# Patient Record
Sex: MALE | Race: WHITE | NOT HISPANIC OR LATINO | URBAN - NONMETROPOLITAN AREA
[De-identification: names, ages, dates, MRNs, and addresses within clinical notes are randomized per-mention and may not be internally consistent; named-entity substitution may affect disease eponyms.]

---

## 2017-01-10 ENCOUNTER — OFFICE VISIT (OUTPATIENT)
Dept: FAMILY MEDICINE CLINIC | Facility: CLINIC | Age: 32
End: 2017-01-10

## 2017-01-10 ENCOUNTER — HOSPITAL ENCOUNTER (OUTPATIENT)
Dept: OTHER | Facility: HOSPITAL | Age: 32
Discharge: HOME | End: 2017-01-10
Attending: NURSE PRACTITIONER | Admitting: NURSE PRACTITIONER

## 2017-01-10 VITALS
WEIGHT: 229.8 LBS | HEIGHT: 72 IN | TEMPERATURE: 97.8 F | SYSTOLIC BLOOD PRESSURE: 130 MMHG | BODY MASS INDEX: 31.13 KG/M2 | HEART RATE: 82 BPM | DIASTOLIC BLOOD PRESSURE: 70 MMHG

## 2017-01-10 DIAGNOSIS — R53.83 FATIGUE, UNSPECIFIED TYPE: Primary | ICD-10-CM

## 2017-01-10 DIAGNOSIS — M25.561 ARTHRALGIA OF BOTH KNEES: ICD-10-CM

## 2017-01-10 DIAGNOSIS — M25.562 ARTHRALGIA OF BOTH KNEES: ICD-10-CM

## 2017-01-10 DIAGNOSIS — G47.30 SLEEP APNEA, UNSPECIFIED TYPE: Primary | ICD-10-CM

## 2017-01-10 DIAGNOSIS — H66.002 ACUTE SUPPURATIVE OTITIS MEDIA OF LEFT EAR WITHOUT SPONTANEOUS RUPTURE OF TYMPANIC MEMBRANE, RECURRENCE NOT SPECIFIED: ICD-10-CM

## 2017-01-10 PROCEDURE — 99213 OFFICE O/P EST LOW 20 MIN: CPT | Performed by: NURSE PRACTITIONER

## 2017-01-10 RX ORDER — FLUTICASONE PROPIONATE 50 MCG
2 SPRAY, SUSPENSION (ML) NASAL DAILY
Qty: 1 EACH | Refills: 0 | Status: SHIPPED | OUTPATIENT
Start: 2017-01-10 | End: 2021-07-20

## 2017-01-10 RX ORDER — AZITHROMYCIN 250 MG/1
TABLET, FILM COATED ORAL
Qty: 6 TABLET | Refills: 0 | Status: SHIPPED | OUTPATIENT
Start: 2017-01-10 | End: 2017-08-16

## 2017-01-10 RX ORDER — MELOXICAM 7.5 MG/1
7.5 TABLET ORAL DAILY
Qty: 30 TABLET | Refills: 2 | Status: SHIPPED | OUTPATIENT
Start: 2017-01-10 | End: 2017-06-09 | Stop reason: SDUPTHER

## 2017-02-01 ENCOUNTER — OFFICE VISIT (OUTPATIENT)
Dept: PULMONOLOGY | Facility: CLINIC | Age: 32
End: 2017-02-01

## 2017-02-01 VITALS
HEIGHT: 72 IN | DIASTOLIC BLOOD PRESSURE: 70 MMHG | BODY MASS INDEX: 31.02 KG/M2 | SYSTOLIC BLOOD PRESSURE: 130 MMHG | WEIGHT: 229 LBS

## 2017-02-01 DIAGNOSIS — G47.30 SLEEP APNEA, UNSPECIFIED TYPE: Primary | ICD-10-CM

## 2017-02-01 PROCEDURE — 99203 OFFICE O/P NEW LOW 30 MIN: CPT | Performed by: INTERNAL MEDICINE

## 2017-02-01 RX ORDER — ALBUTEROL SULFATE 90 UG/1
2 AEROSOL, METERED RESPIRATORY (INHALATION) EVERY 6 HOURS
COMMUNITY
Start: 2016-08-11 | End: 2017-08-12

## 2017-02-01 NOTE — PROGRESS NOTES
Subjective   Marko Jaquez is a 31 y.o. male.  Chief complaint is daytime hypersomnolence     History of Present Illness   This gentleman has a several year history of severe snoring and daytime hypersomnolence.  He falls asleep quite easily and snoring at nighttime and has witnessed apneas by his wife.  Otherwise is healthy individual's underground .  His main medical problems and low back flank pain from an injury.  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  Medications please see his list.  Medication allergies Lortab.  Family history is positive for sleep apnea  The following portions of the patient's history were reviewed and updated as appropriate: allergies, current medications, past family history, past medical history, past social history, past surgical history and problem list.    Review of Systems   Constitutional: Negative for activity change, chills, fatigue, fever and unexpected weight change.   HENT: Positive for congestion. Negative for dental problem, ear discharge, ear pain, facial swelling, hearing loss, nosebleeds, postnasal drip, rhinorrhea, sinus pressure, sneezing, sore throat, tinnitus, trouble swallowing and voice change.    Eyes: Negative.  Negative for photophobia, pain, discharge, redness, itching and visual disturbance.   Respiratory: Negative for cough, chest tightness, shortness of breath and wheezing. Choking:  Daytime hypersomnolence severe snoring.    Cardiovascular: Negative for chest pain, palpitations and leg swelling.   Gastrointestinal: Negative for abdominal distention, abdominal pain and vomiting.   Endocrine: Negative.  Negative for cold intolerance, heat intolerance, polydipsia and polyphagia.   Genitourinary: Negative.  Negative for decreased urine volume, dysuria, enuresis, flank pain, frequency, hematuria and urgency.   Musculoskeletal: Positive for arthralgias and back pain. Negative for gait  problem, joint swelling, myalgias and neck pain.   Skin: Negative for color change, pallor, rash and wound.   Allergic/Immunologic: Negative.  Negative for environmental allergies, food allergies and immunocompromised state.   Neurological: Negative.  Negative for dizziness, tremors, seizures, syncope, facial asymmetry, speech difficulty, weakness, light-headedness, numbness and headaches.   Hematological: Negative for adenopathy. Does not bruise/bleed easily.   Psychiatric/Behavioral: Negative for agitation, behavioral problems, confusion, decreased concentration, hallucinations and self-injury. The patient is not hyperactive.    All other systems reviewed and are negative.      Objective   Physical Exam   Constitutional: He appears well-developed and well-nourished. No distress.   HENT:   Head: Normocephalic.   Mouth/Throat: No oropharyngeal exudate.   Eyes: Conjunctivae are normal. Pupils are equal, round, and reactive to light. Right eye exhibits no discharge. Left eye exhibits no discharge. No scleral icterus.   Neck: Normal range of motion. Neck supple. No JVD present. No tracheal deviation present. No thyromegaly present.   Cardiovascular: Normal rate, regular rhythm, normal heart sounds and intact distal pulses.  Exam reveals no gallop and no friction rub.    No murmur heard.  Pulmonary/Chest: Effort normal and breath sounds normal. No respiratory distress. He has no wheezes. He has no rales. He exhibits no tenderness.   Abdominal: Bowel sounds are normal. He exhibits no distension and no mass. There is no tenderness. There is no guarding.   Musculoskeletal: He exhibits no tenderness or deformity.   Lymphadenopathy:     He has no cervical adenopathy.   Neurological: He is alert. He has normal reflexes. He displays normal reflexes. No cranial nerve deficit. He exhibits normal muscle tone. Coordination normal.   Skin: Skin is warm and dry. No rash noted. He is not diaphoretic. No pallor.   Psychiatric: He has  a normal mood and affect. His behavior is normal. Judgment and thought content normal.   Nursing note and vitals reviewed.      Assessment/Plan   Marko was seen today for follow-up.    Diagnoses and all orders for this visit:    Sleep apnea, unspecified type      Assessment severe snoring and daytime hypersomnolence probably secondary to sleep apnea, low back pain    Plan we'll schedule sleep study and see him back afterwards

## 2017-06-09 RX ORDER — MELOXICAM 7.5 MG/1
TABLET ORAL
Qty: 30 TABLET | Refills: 0 | Status: SHIPPED | OUTPATIENT
Start: 2017-06-09 | End: 2017-08-10 | Stop reason: SDUPTHER

## 2017-08-10 RX ORDER — MELOXICAM 7.5 MG/1
TABLET ORAL
Qty: 30 TABLET | Refills: 0 | Status: SHIPPED | OUTPATIENT
Start: 2017-08-10 | End: 2017-09-22 | Stop reason: SDUPTHER

## 2017-08-16 ENCOUNTER — OFFICE VISIT (OUTPATIENT)
Dept: FAMILY MEDICINE CLINIC | Facility: CLINIC | Age: 32
End: 2017-08-16

## 2017-08-16 VITALS
BODY MASS INDEX: 30.61 KG/M2 | OXYGEN SATURATION: 98 % | HEIGHT: 72 IN | DIASTOLIC BLOOD PRESSURE: 78 MMHG | SYSTOLIC BLOOD PRESSURE: 118 MMHG | HEART RATE: 87 BPM | WEIGHT: 226 LBS

## 2017-08-16 DIAGNOSIS — M25.511 ACUTE PAIN OF RIGHT SHOULDER: ICD-10-CM

## 2017-08-16 DIAGNOSIS — M54.41 CHRONIC BILATERAL LOW BACK PAIN WITH BILATERAL SCIATICA: Primary | ICD-10-CM

## 2017-08-16 DIAGNOSIS — M54.42 CHRONIC BILATERAL LOW BACK PAIN WITH BILATERAL SCIATICA: Primary | ICD-10-CM

## 2017-08-16 DIAGNOSIS — G89.29 CHRONIC BILATERAL LOW BACK PAIN WITH BILATERAL SCIATICA: Primary | ICD-10-CM

## 2017-08-16 PROCEDURE — 99214 OFFICE O/P EST MOD 30 MIN: CPT | Performed by: NURSE PRACTITIONER

## 2017-08-16 RX ORDER — IBUPROFEN 800 MG/1
800 TABLET ORAL EVERY 6 HOURS PRN
Qty: 60 TABLET | Refills: 0 | Status: SHIPPED | OUTPATIENT
Start: 2017-08-16 | End: 2017-11-21 | Stop reason: ALTCHOICE

## 2017-08-16 NOTE — PROGRESS NOTES
Subjective   Marko Jaquez is a 31 y.o. male. Patient here today with complaints of Back Pain (Needs letter for work becuase of back pain)  Patient here today with complaints of recurrent low back pain which has worsened after we changed positions at his job.  He has been more active and has been lifting heavier objects in his pain has worsened.  His pain does increase with twisting motions and position changes.  He reports radicular symptoms to hips bilaterally but denies weakness or numbness or tingling down lower extremities.  He has history of back surgery with Dr. Frantz Harper in [place] approximately 3 years ago.  Not currently taking any pain medication but is on Moby Aris daily for joint pain.  Rates pain as 4-5/10 on the pain scale today.  In addition to above, he is complaining of right shoulder pain, present off and on for the last few months, worse with raising his arm.  Denies injury.    Vitals:    08/16/17 0936   BP: 118/78   Pulse: 87   SpO2: 98%     Past Medical History:   Diagnosis Date   • Acute bronchitis    • Acute pharyngitis    • Backache    • Chronic low back pain    • Dysfunction of eustachian tube    • Exanthematous disorder    • Generalized abdominal pain    • Headache    • Health examination of defined sub-group    • Heat exhaustion    • Influenza- like illness    • Intervertebral disc prolapse    • Low back pain     with radiculopathy   • Malaise and fatigue    • Pain in elbow    • Pneumonia    • Sciatica    • Skin sensation disturbance      Back Pain   This is a recurrent problem. The current episode started more than 1 month ago. The problem occurs constantly. The problem is unchanged. The pain is present in the lumbar spine. The quality of the pain is described as aching. Radiates to: Bilateral hips. The pain is at a severity of 5/10. The pain is moderate. The symptoms are aggravated by bending, position, standing, sitting and twisting. Associated symptoms include pelvic pain.  Pertinent negatives include no abdominal pain, bladder incontinence, bowel incontinence, chest pain, dysuria, fever, headaches, leg pain, numbness, paresis, paresthesias, perianal numbness, tingling, weakness or weight loss. Risk factors include obesity. He has tried NSAIDs for the symptoms. The treatment provided mild relief.   Upper Extremity Issue   This is a new problem. The current episode started 1 to 4 weeks ago. The problem occurs constantly. The problem has been unchanged. Pertinent negatives include no abdominal pain, anorexia, arthralgias, change in bowel habit, chest pain, chills, congestion, coughing, diaphoresis, fatigue, fever, headaches, joint swelling, myalgias, nausea, neck pain, numbness, rash, sore throat, swollen glands, urinary symptoms, vertigo, visual change, vomiting or weakness. Exacerbated by: Raising right upper extremity. He has tried NSAIDs and rest for the symptoms. The treatment provided mild relief.        The following portions of the patient's history were reviewed and updated as appropriate: allergies, current medications, past family history, past medical history, past social history, past surgical history and problem list.    Review of Systems   Constitutional: Negative.  Negative for chills, diaphoresis, fatigue, fever and weight loss.   HENT: Negative.  Negative for congestion and sore throat.    Eyes: Negative.    Respiratory: Negative.  Negative for cough.    Cardiovascular: Negative.  Negative for chest pain.   Gastrointestinal: Negative.  Negative for abdominal pain, anorexia, bowel incontinence, change in bowel habit, nausea and vomiting.   Endocrine: Negative.    Genitourinary: Positive for pelvic pain. Negative for bladder incontinence and dysuria.   Musculoskeletal: Positive for back pain. Negative for arthralgias, joint swelling, myalgias and neck pain.   Skin: Negative.  Negative for rash.   Allergic/Immunologic: Negative.    Neurological: Negative.  Negative for  vertigo, tingling, weakness, numbness, headaches and paresthesias.   Hematological: Negative.    Psychiatric/Behavioral: Negative.        Objective   Physical Exam   Constitutional: He is oriented to person, place, and time. He appears well-developed and well-nourished. No distress.   HENT:   Head: Normocephalic and atraumatic.   Cardiovascular: Normal rate, regular rhythm, normal heart sounds and intact distal pulses.  Exam reveals no gallop and no friction rub.    No murmur heard.  Pulmonary/Chest: Effort normal and breath sounds normal. No respiratory distress. He has no wheezes. He has no rales.   Musculoskeletal: He exhibits tenderness.        Right shoulder: He exhibits decreased range of motion, tenderness and bony tenderness. He exhibits no swelling, no effusion, no crepitus, no deformity, no laceration, no pain, no spasm, normal pulse and normal strength.        Lumbar back: He exhibits decreased range of motion, tenderness, bony tenderness and pain. He exhibits no swelling, no edema, no deformity, no laceration, no spasm and normal pulse.   Right shoulder popping with ROM     Neurological: He is alert and oriented to person, place, and time. He has normal reflexes. He displays normal reflexes. No cranial nerve deficit. He exhibits normal muscle tone. Coordination normal.   Skin: Skin is warm and dry. No rash noted. He is not diaphoretic. No erythema. No pallor.   Psychiatric: He has a normal mood and affect. His behavior is normal. Judgment and thought content normal.   Nursing note and vitals reviewed.      Assessment/Plan   Marko was seen today for back pain.    Diagnoses and all orders for this visit:    Chronic bilateral low back pain with bilateral sciatica  -     XR Spine Lumbar 4+ View    Acute pain of right shoulder  -     Cancel: XR Shoulder 2+ View Left  -     XR shoulder 2+ vw right    Other orders  -     ibuprofen (ADVIL,MOTRIN) 800 MG tablet; Take 1 tablet by mouth Every 6 (Six) Hours As  Needed for Mild Pain .    He is given ibuprofen 800 mg to take 3 times a day when necessary pain but is advised not to take with Mobitz.  I will give him a work excuse with no heavy lifting, no lifting over 5-10 pounds for the next month and he will follow-up in one month for recheck or sooner as necessary.  May need to refer on for MRI/physical therapy/neurosurgery if symptoms persist or worsen.  We will obtain L-spine x-ray and right shoulder x-ray as well.  I will inform him of these results.  If his symptoms persist or worsen he will return to clinic sooner than 1 month.  He is aware.  All questions and concerns are addressed with understanding noted. The patient is in agreement to above plan.

## 2017-09-22 RX ORDER — MELOXICAM 7.5 MG/1
TABLET ORAL
Qty: 30 TABLET | Refills: 1 | Status: SHIPPED | OUTPATIENT
Start: 2017-09-22 | End: 2017-11-21 | Stop reason: DRUGHIGH

## 2017-11-21 ENCOUNTER — OFFICE VISIT (OUTPATIENT)
Dept: FAMILY MEDICINE CLINIC | Facility: CLINIC | Age: 32
End: 2017-11-21

## 2017-11-21 VITALS
DIASTOLIC BLOOD PRESSURE: 90 MMHG | OXYGEN SATURATION: 99 % | WEIGHT: 233 LBS | HEIGHT: 72 IN | RESPIRATION RATE: 18 BRPM | BODY MASS INDEX: 31.56 KG/M2 | SYSTOLIC BLOOD PRESSURE: 140 MMHG | HEART RATE: 88 BPM | TEMPERATURE: 98.7 F

## 2017-11-21 DIAGNOSIS — R20.2 NUMBNESS AND TINGLING OF RIGHT ARM: ICD-10-CM

## 2017-11-21 DIAGNOSIS — R20.0 NUMBNESS AND TINGLING OF RIGHT ARM: ICD-10-CM

## 2017-11-21 DIAGNOSIS — M25.511 ACUTE PAIN OF RIGHT SHOULDER: Primary | ICD-10-CM

## 2017-11-21 PROCEDURE — 96372 THER/PROPH/DIAG INJ SC/IM: CPT | Performed by: NURSE PRACTITIONER

## 2017-11-21 PROCEDURE — 99213 OFFICE O/P EST LOW 20 MIN: CPT | Performed by: NURSE PRACTITIONER

## 2017-11-21 RX ORDER — MELOXICAM 15 MG/1
15 TABLET ORAL DAILY
Qty: 30 TABLET | Refills: 0 | Status: SHIPPED | OUTPATIENT
Start: 2017-11-21 | End: 2017-12-18 | Stop reason: SDUPTHER

## 2017-11-21 RX ORDER — LIDOCAINE 50 MG/G
OINTMENT TOPICAL NIGHTLY PRN
Qty: 50 G | Refills: 0 | Status: SHIPPED | OUTPATIENT
Start: 2017-11-21 | End: 2017-12-18 | Stop reason: SDUPTHER

## 2017-11-21 RX ORDER — BACLOFEN 10 MG/1
10 TABLET ORAL 3 TIMES DAILY PRN
Qty: 30 TABLET | Refills: 0 | Status: SHIPPED | OUTPATIENT
Start: 2017-11-21 | End: 2017-11-30 | Stop reason: SDUPTHER

## 2017-11-21 RX ORDER — KETOROLAC TROMETHAMINE 30 MG/ML
60 INJECTION, SOLUTION INTRAMUSCULAR; INTRAVENOUS ONCE
Status: DISCONTINUED | OUTPATIENT
Start: 2017-11-21 | End: 2017-11-21

## 2017-11-21 RX ORDER — TRIAMCINOLONE ACETONIDE 40 MG/ML
60 INJECTION, SUSPENSION INTRA-ARTICULAR; INTRAMUSCULAR ONCE
Status: COMPLETED | OUTPATIENT
Start: 2017-11-21 | End: 2017-11-21

## 2017-11-21 RX ORDER — KETOROLAC TROMETHAMINE 30 MG/ML
60 INJECTION, SOLUTION INTRAMUSCULAR; INTRAVENOUS ONCE
Status: COMPLETED | OUTPATIENT
Start: 2017-11-21 | End: 2017-11-21

## 2017-11-21 RX ADMIN — KETOROLAC TROMETHAMINE 60 MG: 30 INJECTION, SOLUTION INTRAMUSCULAR; INTRAVENOUS at 14:26

## 2017-11-21 RX ADMIN — TRIAMCINOLONE ACETONIDE 60 MG: 40 INJECTION, SUSPENSION INTRA-ARTICULAR; INTRAMUSCULAR at 14:23

## 2017-11-21 NOTE — PROGRESS NOTES
Subjective   Marko Jaquez is a 31 y.o. male who presents to the office for right shoulder pain. This is a worker's comp visit.     History of Present Illness     This is a worker's comp visit.     works for [employer] [place] in [facility].  At work on November 15, 2017, patient was building a [employer] and went to lift a 20-30 pound concrete block and felt his right shoulder pop.  Patient states that he notified his claim officer Pat garcia.  Patient has had this happen in the past but he got better he was completely fine and he did follow this through Workmen's Comp.  Patient states that this time it is worse, patient states that over the past week since this happened he has had pain in his shoulder posterior and anteriorly and over the deltoid muscle, with pain radiating up to the right side of his neck and onto the back of his shoulder blade.  Pain is normally very minimal if patient is not moving his right arm, however when patient moves a pain jumps up to it 8 or 9 out of 10, patient states sharp pain shooting into the neck or back or shoulder blade.  Patient states that when this happened he went to the [facility] ER, they did x-rays of his right shoulder which were negative, they told him to go for days without lifting anything and then to follow-up with his PCP.  Patient's primary PCP Lacey Munson, however patient was not able to get in with her and she does not to Worker's Comp.  Patient states that yesterday November 20, his right hand has started to swell up and go numb, patient states that this is not related to movement, he can just happen when he is sitting completely still, develops over time and then goes away but has fluctuated back and forth and came back since yesterday and today off and on, patient states that the swelling and numbness is mainly in the thumb ring finger and middle finger of the right hand.  Patient states that he normally takes 7.5 mg daily of mobic for bilateral  "knee pain because he has a history of arthritis in his knees.  She has been taking ibuprofen when necessary and applying Aspercreme OTC to the right shoulder which does help some with the pain.  Patient states that with his job if he is put on any work restrictions he will not be able to work.    The following portions of the patient's history were reviewed and updated as appropriate: allergies, current medications, past family history, past medical history, past social history, past surgical history and problem list.    Review of Systems   Musculoskeletal: Positive for arthralgias (bilateral knee, right shoulder).   Neurological: Positive for numbness. Negative for weakness.       Past Medical History:   Diagnosis Date   • Acute bronchitis    • Acute pharyngitis    • Backache    • Chronic low back pain    • Dysfunction of eustachian tube    • Exanthematous disorder    • Generalized abdominal pain    • Headache    • Health examination of defined sub-group    • Heat exhaustion    • Influenza- like illness    • Intervertebral disc prolapse    • Low back pain     with radiculopathy   • Malaise and fatigue    • Pain in elbow    • Pneumonia    • Sciatica    • Skin sensation disturbance        History reviewed. No pertinent family history.       Objective   /90  Pulse 88  Temp 98.7 °F (37.1 °C) (Oral)   Resp 18  Ht 72\" (182.9 cm)  Wt 233 lb (106 kg)  SpO2 99%  BMI 31.6 kg/m2  Physical Exam   Constitutional: He appears well-developed and well-nourished. No distress.   Cardiovascular: Normal rate, regular rhythm, normal heart sounds and intact distal pulses.  Exam reveals no gallop and no friction rub.    No murmur heard.  Pulmonary/Chest: Effort normal and breath sounds normal. No respiratory distress. He has no wheezes. He has no rales.   Musculoskeletal:        Right shoulder: He exhibits decreased range of motion, tenderness, bony tenderness, pain and decreased strength. He exhibits no swelling, no " effusion, no crepitus, no deformity, no laceration, no spasm and normal pulse.        Arms:  Psychiatric: He has a normal mood and affect. His behavior is normal.   Nursing note and vitals reviewed.       PHQ-2/PHQ-9 Depression Screening 11/21/2017   Little interest or pleasure in doing things 0   Feeling down, depressed, or hopeless 0   Total Score 0         Assessment/Plan   Marko was seen today for shoulder injury.    Diagnoses and all orders for this visit:    Acute pain of right shoulder  -     lidocaine (XYLOCAINE) 5 % ointment; Apply  topically At Night As Needed for Mild Pain  or Moderate Pain .  -     meloxicam (MOBIC) 15 MG tablet; Take 1 tablet by mouth Daily.  -     baclofen (LIORESAL) 10 MG tablet; Take 1 tablet by mouth 3 (Three) Times a Day As Needed for Muscle Spasms.  -     MRI shoulder right w wo contrast  -     Discontinue: ketorolac (TORADOL) injection 60 mg; Infuse 60 mg into a venous catheter 1 (One) Time.  -     triamcinolone acetonide (KENALOG-40) injection 60 mg; Inject 1.5 mL into the shoulder, thigh, or buttocks 1 (One) Time.  -     ketorolac (TORADOL) injection 60 mg; Inject 60 mg into the shoulder, thigh, or buttocks 1 (One) Time.    Numbness and tingling of right arm    Other orders  -     Cancel: MRI shoulder right w wo contrast; Future    This is a worker's comp visit.     Acute right shoulder pain and numbness of right arm-prescribed lidocaine, increased Mobic from 7.5 to 15 mg daily, stop taking ibuprofen, prescribed baclofen prn. Gave toradol shot in office. Ordered MRI. Wrote note for patient to have restrictions at work to not lift over five pounds with right arm.       Patient educated to follow-up sooner than next scheduled appointment if condition(s) worse or do not improve. Patient states understanding and is in agreeance with plan of care. An After Visit Summary was printed and given to the patient.    This is a worker's comp visit.     Current Outpatient Prescriptions:   •   Esomeprazole Magnesium (NEXIUM 24HR PO), Take  by mouth., Disp: , Rfl:   •  baclofen (LIORESAL) 10 MG tablet, Take 1 tablet by mouth 3 (Three) Times a Day As Needed for Muscle Spasms., Disp: 30 tablet, Rfl: 0  •  lidocaine (XYLOCAINE) 5 % ointment, Apply  topically At Night As Needed for Mild Pain  or Moderate Pain ., Disp: 50 g, Rfl: 0  •  meloxicam (MOBIC) 15 MG tablet, Take 1 tablet by mouth Daily., Disp: 30 tablet, Rfl: 0  No current facility-administered medications for this visit.       CLOVIS Arreola        This document has been electronically signed by CLOVIS Arreola on November 21, 2017 4:49 PM

## 2017-11-27 ENCOUNTER — TELEPHONE (OUTPATIENT)
Dept: FAMILY MEDICINE CLINIC | Facility: CLINIC | Age: 32
End: 2017-11-27

## 2017-11-30 ENCOUNTER — OFFICE VISIT (OUTPATIENT)
Dept: FAMILY MEDICINE CLINIC | Facility: CLINIC | Age: 32
End: 2017-11-30

## 2017-11-30 VITALS
WEIGHT: 225 LBS | DIASTOLIC BLOOD PRESSURE: 70 MMHG | OXYGEN SATURATION: 98 % | HEIGHT: 72 IN | RESPIRATION RATE: 16 BRPM | TEMPERATURE: 98.7 F | SYSTOLIC BLOOD PRESSURE: 110 MMHG | BODY MASS INDEX: 30.48 KG/M2 | HEART RATE: 71 BPM

## 2017-11-30 DIAGNOSIS — M25.511 ACUTE PAIN OF RIGHT SHOULDER: ICD-10-CM

## 2017-11-30 DIAGNOSIS — R20.2 NUMBNESS AND TINGLING OF RIGHT HAND: ICD-10-CM

## 2017-11-30 DIAGNOSIS — R20.0 NUMBNESS AND TINGLING OF RIGHT HAND: ICD-10-CM

## 2017-11-30 DIAGNOSIS — Z02.6 ENCOUNTER RELATED TO WORKER'S COMPENSATION CLAIM: Primary | ICD-10-CM

## 2017-11-30 DIAGNOSIS — M79.89 SWELLING OF RIGHT HAND: ICD-10-CM

## 2017-11-30 PROCEDURE — 99213 OFFICE O/P EST LOW 20 MIN: CPT | Performed by: NURSE PRACTITIONER

## 2017-11-30 RX ORDER — BACLOFEN 10 MG/1
10 TABLET ORAL 3 TIMES DAILY PRN
Qty: 90 TABLET | Refills: 0 | Status: SHIPPED | OUTPATIENT
Start: 2017-11-30 | End: 2017-12-18 | Stop reason: SDUPTHER

## 2017-11-30 NOTE — PROGRESS NOTES
Subjective   Marko Jaquez is a 32 y.o. male who presents to the office for F/U of worker's comp for acute right shoulder pain and numbness of right arm.     History of Present Illness     This is a worker's comp visit.     11/21/17 Initial Visit for worker's comp: Patient works for [employer], [employer] in [facility].  At work on November 15, 2017, patient was building a [employer] and went to lift a 20-30 pound concrete block and felt his right shoulder pop.  Patient states that he notified his claim officer Pat Waldrop (who is now Zuleyma Pascual).  Patient has had this happen in the past but he got better, he was completely fine and he did follow this through Workmen's Comp for that past incident.  Patient states that this time it is worse, patient states that over the past week since this happened he has had pain in his shoulder posterior and anteriorly and over the deltoid muscle, with pain radiating up to the right side of his neck and onto the back of his shoulder blade.  Pain is normally very minimal if patient is not moving his right arm, however when patient moves a pain jumps up to it 8 or 9 out of 10, patient states sharp pain shooting into the neck or back or shoulder blade.  Patient states that when this happened he went to the [facility] ER, they did x-rays of his right shoulder which were negative, they told him to go for days without lifting anything and then to follow-up with his PCP.  Patient's primary PCP Lacey Munson, however patient was not able to get in with her and she does not to Worker's Comp.  Patient states that yesterday November 20, his right hand has started to swell up and go numb, patient states that this is not related to movement, he can just happen when he is sitting completely still, develops over time and then goes away but has fluctuated back and forth and came back since yesterday and today off and on, patient states that the swelling and numbness is mainly in the thumb ring  "finger and middle finger of the right hand.  Patient states that he normally takes 7.5 mg daily of mobic for bilateral knee pain because he has a history of arthritis in his knees.  She has been taking ibuprofen when necessary and applying Aspercreme OTC to the right shoulder which does help some with the pain.  Patient states that with his job if he is put on any work restrictions he will not be able to work. At initial appt, we prescribed lidocaine, increased Mobic from 7.5 to 15 mg daily, stop taking ibuprofen, prescribed baclofen prn. Gave toradol shot in office. Ordered MRI. Wrote note for patient to have restrictions at work to not lift over five pounds with right arm for acute right shoulder pain and numbness of right arm.      Today, patient states pian is more tolerable, that the lidocaine helps a lot. Patient states baclofen helps in the am when he takes it but wears off in the late afternoons/evenings. Mobic at a higher dose is working really well and he states that the shot at his last appt helped too. Patient states pain the least in the ams but increase throughout the day as he moves his arm more. Patient states he has not been lifting anything over five pounds with his right arm. Patient states when he holds right forearm against his chest, the right shoulder \"pops and it relieves some pressure.\" swelling of right hand has decreased but numbness of right hand is still there especially when he uses his right hand. Patient states he can hold anything in his right arm but when he goes to lift or push and it moves his shoulder, it causing a significant amount of sharp pain in his right shoulder.     This is a worker's comp visit.     The following portions of the patient's history were reviewed and updated as appropriate: allergies, current medications, past family history, past medical history, past social history, past surgical history and problem list.    Review of Systems   Musculoskeletal: Positive " "for arthralgias (bilateral knee, right shoulder).   Neurological: Positive for numbness. Negative for weakness.       Past Medical History:   Diagnosis Date   • Acute bronchitis    • Acute pharyngitis    • Backache    • Chronic low back pain    • Dysfunction of eustachian tube    • Exanthematous disorder    • Generalized abdominal pain    • Headache    • Health examination of defined sub-group    • Heat exhaustion    • Influenza- like illness    • Intervertebral disc prolapse    • Low back pain     with radiculopathy   • Malaise and fatigue    • Pain in elbow    • Pneumonia    • Sciatica    • Skin sensation disturbance        No family history on file.       Objective   /70  Pulse 71  Temp 98.7 °F (37.1 °C) (Oral)   Resp 16  Ht 72\" (182.9 cm)  Wt 225 lb (102 kg)  SpO2 98%  BMI 30.52 kg/m2  Physical Exam   Constitutional: He appears well-developed and well-nourished. No distress.   Cardiovascular: Normal rate, regular rhythm, normal heart sounds and intact distal pulses.  Exam reveals no gallop and no friction rub.    No murmur heard.  Pulmonary/Chest: Effort normal and breath sounds normal. No respiratory distress. He has no wheezes. He has no rales.   Musculoskeletal:        Right shoulder: He exhibits decreased range of motion, tenderness, bony tenderness, pain and decreased strength. He exhibits no swelling, no effusion, no crepitus, no deformity, no laceration, no spasm and normal pulse.        Arms:  Psychiatric: He has a normal mood and affect. His behavior is normal.   Nursing note and vitals reviewed.       PHQ-2/PHQ-9 Depression Screening 11/21/2017   Little interest or pleasure in doing things 0   Feeling down, depressed, or hopeless 0   Total Score 0         Assessment/Plan   Marko was seen today for follow-up.    Diagnoses and all orders for this visit:    Encounter related to worker's compensation claim    Acute pain of right shoulder  -     baclofen (LIORESAL) 10 MG tablet; Take 1 tablet " by mouth 3 (Three) Times a Day As Needed for Muscle Spasms.    Numbness and tingling of right hand    Swelling of right hand         This is a worker's comp visit.    Patient has right shoulder pain with numbess/tingling/swelling of right hand-continue mobic at 15mg daily, continue lidocaine, increase baclofen up to three times a day if needed, MRI still needing approval, F/U in three weeks or sooner pending on MRI results, same restrictions of no lifting anything over 5 lbs with right arm.     Patient educated to follow-up sooner than next scheduled appointment if condition(s) worse or do not improve. Patient states understanding and is in agreeance with plan of care. An After Visit Summary was printed and given to the patient.      Current Outpatient Prescriptions:   •  baclofen (LIORESAL) 10 MG tablet, Take 1 tablet by mouth 3 (Three) Times a Day As Needed for Muscle Spasms., Disp: 90 tablet, Rfl: 0  •  Esomeprazole Magnesium (NEXIUM 24HR PO), Take  by mouth., Disp: , Rfl:   •  lidocaine (XYLOCAINE) 5 % ointment, Apply  topically At Night As Needed for Mild Pain  or Moderate Pain ., Disp: 50 g, Rfl: 0  •  meloxicam (MOBIC) 15 MG tablet, Take 1 tablet by mouth Daily., Disp: 30 tablet, Rfl: 0      CLOVIS Arreola        This document has been electronically signed by CLOVIS Arreola on November 30, 2017 3:19 PM

## 2017-12-05 ENCOUNTER — DOCUMENTATION (OUTPATIENT)
Dept: FAMILY MEDICINE CLINIC | Facility: CLINIC | Age: 32
End: 2017-12-05

## 2017-12-05 NOTE — PROGRESS NOTES
"Reviewed ED report from [facility] in [place], [place] for worker's comp accident with C/O of right shoulder pain on 11/15/17.     Patient works at [employer] and felt \"pop\" in right shoulder, this has happened before. H/O of right shoulder strain in the past. Xray of right shoulder during ED visit showed no fracture. Dx of right deltoid muscle strain.     D/C instructions included F/U with PCP in 2-3days, avoid use of upper extremity and avoid lifting/pulling/dragging objects x 4days, and ibuprofen as directed for pain.   "

## 2017-12-18 ENCOUNTER — OFFICE VISIT (OUTPATIENT)
Dept: FAMILY MEDICINE CLINIC | Facility: CLINIC | Age: 32
End: 2017-12-18

## 2017-12-18 VITALS
HEIGHT: 72 IN | WEIGHT: 230 LBS | DIASTOLIC BLOOD PRESSURE: 80 MMHG | BODY MASS INDEX: 31.15 KG/M2 | TEMPERATURE: 98.4 F | HEART RATE: 87 BPM | RESPIRATION RATE: 16 BRPM | OXYGEN SATURATION: 99 % | SYSTOLIC BLOOD PRESSURE: 110 MMHG

## 2017-12-18 DIAGNOSIS — M25.511 ACUTE PAIN OF RIGHT SHOULDER: ICD-10-CM

## 2017-12-18 DIAGNOSIS — R20.0 NUMBNESS AND TINGLING OF RIGHT ARM: ICD-10-CM

## 2017-12-18 DIAGNOSIS — Z02.6 ENCOUNTER RELATED TO WORKER'S COMPENSATION CLAIM: Primary | ICD-10-CM

## 2017-12-18 DIAGNOSIS — R20.2 NUMBNESS AND TINGLING OF RIGHT ARM: ICD-10-CM

## 2017-12-18 DIAGNOSIS — M54.2 NECK PAIN, ACUTE: ICD-10-CM

## 2017-12-18 DIAGNOSIS — M79.89 SWELLING OF RIGHT HAND: ICD-10-CM

## 2017-12-18 PROCEDURE — 99213 OFFICE O/P EST LOW 20 MIN: CPT | Performed by: NURSE PRACTITIONER

## 2017-12-18 RX ORDER — BACLOFEN 10 MG/1
10 TABLET ORAL 3 TIMES DAILY PRN
Qty: 90 TABLET | Refills: 0 | Status: SHIPPED | OUTPATIENT
Start: 2017-12-18 | End: 2018-01-19 | Stop reason: SDUPTHER

## 2017-12-18 RX ORDER — MELOXICAM 15 MG/1
15 TABLET ORAL DAILY
Qty: 30 TABLET | Refills: 0 | Status: SHIPPED | OUTPATIENT
Start: 2017-12-18 | End: 2018-01-17 | Stop reason: SDUPTHER

## 2017-12-18 RX ORDER — LIDOCAINE 50 MG/G
OINTMENT TOPICAL NIGHTLY PRN
Qty: 50 G | Refills: 0 | Status: SHIPPED | OUTPATIENT
Start: 2017-12-18 | End: 2018-07-12 | Stop reason: SDUPTHER

## 2017-12-18 NOTE — PROGRESS NOTES
Subjective   Marko Jaquez is a 32 y.o. male who presents to the office for F/U for worker's comp of right shoulder pain and associated problems.     History of Present Illness     This is a worker's comp visit.      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  At work on November 15, 2017, patient was building a [employer] and went to lift a 20-30 pound concrete block and felt his right shoulder pop.  Patient states that he notified his claim officer Pat Waldrop (who is now Zuleyma Pascual).  Patient has had this happen in the past but he got better, he was completely fine and he did follow this through Workmen's Comp for that past incident.  Patient states that this time it is worse, patient states that over the past week since this happened he has had pain in his shoulder posterior and anteriorly and over the deltoid muscle, with pain radiating up to the right side of his neck and onto the back of his shoulder blade.  Pain is normally very minimal if patient is not moving his right arm, however when patient moves a pain jumps up to it 8 or 9 out of 10, patient states sharp pain shooting into the neck or back or shoulder blade.  Patient states that when this happened he went to the [facility] ER, they did x-rays of his right shoulder which were negative, they told him to go four days without lifting anything and then to follow-up with his PCP.  Patient's primary PCP Lacey Munson, however patient was not able to get in with her and she does not do Worker's Comp.  Patient states that November 20th, his right hand has started to swell up and go numb, patient states that this is not related to movement, he can just happen when he is sitting completely still, develops over time and then goes away but has fluctuated back and forth and came back since yesterday and today off and on, patient states that the swelling and numbness is mainly in the thumb ring finger and  "middle finger of the right hand.  Patient states that he normally takes 7.5 mg daily of mobic for bilateral knee pain because he has a history of arthritis in his knees.  He had been taking ibuprofen when necessary and applying Aspercreme OTC to the right shoulder which does help some with the pain.  Patient states that with his job if he is put on any work restrictions he will not be able to work. At initial appt, we prescribed lidocaine, increased Mobic from 7.5 to 15 mg daily, stop taking ibuprofen, prescribed baclofen prn. Ordered MRI. Wrote note for patient to have restrictions at work to not lift over five pounds with right arm for acute right shoulder pain and numbness of right arm.       Today, patient states pain is about the same, baclofen helps when it was increased to TID prn, along with lidocaine prn and mobic 15 mg daily. Patient states pain the least in the ams but increase throughout the day as he moves his arm more. Patient states he has not been lifting anything over five pounds with his right arm unless he is using his left arm to help. Patient states when he holds right forearm against his chest, the right shoulder \"pops and it relieves some pressure\" and that he also hears like a grinding sound. The swelling of right hand has decreased with minimal numbness of right hand but the incidence of numbness and swelling increase with use of right shoulder and arm. Patient states he can hold anything in his right arm but when he goes to lift or push and it moves his shoulder, it causing a significant amount of sharp pain in his right shoulder.  Patient also states that he has some right sided neck pain radiation, shooting type pain like in the upper shoulder, intermittently on a daily basis. Patient states that he has been having trouble getting paid for worker's comp and when he called to see why, he found out that his Worker's Comp.  taking his case right before she left and was out for 3-4 " "weeks, which explains why his MRI was not process.  Patient states that he has an MRI scheduled for 12/27/17 in [place].  Patient aware that he must notify [place] to send the MRI results to my office.     This is a worker's comp visit.        The following portions of the patient's history were reviewed and updated as appropriate: allergies, current medications, past family history, past medical history, past social history, past surgical history and problem list.    Review of Systems   Musculoskeletal: Positive for arthralgias (bilateral knee, right shoulder) and neck pain (right sided).   Neurological: Positive for numbness. Negative for weakness.       Past Medical History:   Diagnosis Date   • Acute bronchitis    • Acute pharyngitis    • Backache    • Chronic low back pain    • Dysfunction of eustachian tube    • Exanthematous disorder    • Generalized abdominal pain    • Headache    • Health examination of defined sub-group    • Heat exhaustion    • Influenza- like illness    • Intervertebral disc prolapse    • Low back pain     with radiculopathy   • Malaise and fatigue    • Pain in elbow    • Pneumonia    • Sciatica    • Skin sensation disturbance        History reviewed. No pertinent family history.       Objective   /80  Pulse 87  Temp 98.4 °F (36.9 °C) (Oral)   Resp 16  Ht 182.9 cm (72\")  Wt 104 kg (230 lb)  SpO2 99%  BMI 31.19 kg/m2  Physical Exam   Constitutional: He appears well-developed and well-nourished. No distress.   Cardiovascular: Normal rate, regular rhythm, normal heart sounds and intact distal pulses.  Exam reveals no gallop and no friction rub.    No murmur heard.  Pulmonary/Chest: Effort normal and breath sounds normal. No respiratory distress. He has no wheezes. He has no rales.   Musculoskeletal:        Right shoulder: He exhibits decreased range of motion, tenderness, bony tenderness, pain and decreased strength. He exhibits no swelling, no effusion, no crepitus, no " deformity, no laceration, no spasm and normal pulse.        Cervical back: He exhibits tenderness (right side) and pain (right side). He exhibits normal range of motion, no bony tenderness, no swelling, no edema, no deformity, no laceration, no spasm and normal pulse.        Arms:  Psychiatric: He has a normal mood and affect. His behavior is normal.   Nursing note and vitals reviewed.       PHQ-2/PHQ-9 Depression Screening 12/18/2017   Little interest or pleasure in doing things 0   Feeling down, depressed, or hopeless 0   Total Score 0         Assessment/Plan   Marko was seen today for shoulder pain.    Diagnoses and all orders for this visit:    Encounter related to worker's compensation claim    Acute pain of right shoulder  -     baclofen (LIORESAL) 10 MG tablet; Take 1 tablet by mouth 3 (Three) Times a Day As Needed for Muscle Spasms.  -     meloxicam (MOBIC) 15 MG tablet; Take 1 tablet by mouth Daily.  -     lidocaine (XYLOCAINE) 5 % ointment; Apply  topically At Night As Needed for Mild Pain  or Moderate Pain .    Numbness and tingling of right arm    Swelling of right hand    Neck pain, acute  Comments:  Right side    This is a Worker's Comp.  Visit.    Patient has right shoulder pain with numbess/tingling of the right arm and swelling of right hand, along with some radiating right sided neck pain related to Worker's Comp.  Injury-continue mobic at 15mg daily, continue lidocaine, continue baclofen up to three times a day if needed, MRI scheduled for 12/27/17 in [place], F/U in out 2 weeks, same restrictions of no lifting anything over 5 lbs with right arm.      Patient educated to follow-up sooner than next scheduled appointment if condition(s) worse or do not improve. Patient states understanding and is in agreeance with plan of care. An After Visit Summary was printed and given to the patient.    This is a Worker's Comp. visit.      Current Outpatient Prescriptions:   •  baclofen (LIORESAL) 10 MG  tablet, Take 1 tablet by mouth 3 (Three) Times a Day As Needed for Muscle Spasms., Disp: 90 tablet, Rfl: 0  •  Esomeprazole Magnesium (NEXIUM 24HR PO), Take  by mouth., Disp: , Rfl:   •  lidocaine (XYLOCAINE) 5 % ointment, Apply  topically At Night As Needed for Mild Pain  or Moderate Pain ., Disp: 50 g, Rfl: 0  •  meloxicam (MOBIC) 15 MG tablet, Take 1 tablet by mouth Daily., Disp: 30 tablet, Rfl: 0      CLOVIS Arreola        This document has been electronically signed by CLOVIS Arreola on December 18, 2017 2:21 PM

## 2018-01-04 ENCOUNTER — TELEPHONE (OUTPATIENT)
Dept: FAMILY MEDICINE CLINIC | Facility: CLINIC | Age: 33
End: 2018-01-04

## 2018-01-04 NOTE — TELEPHONE ENCOUNTER
----- Message from CLOVIS Arreola sent at 1/4/2018  8:24 AM CST -----  Call to ask where he got mri done at and then ask [facility] to get results please since they were not sent to us, I'm pretty sure it was the [facility] but just to double check, thank you

## 2018-01-04 NOTE — TELEPHONE ENCOUNTER
Called spoke to wife Sandra she said that pt had MRI done on 12/21/2017 At [facility] passing the message to [facility] to request records.

## 2018-01-05 ENCOUNTER — OFFICE VISIT (OUTPATIENT)
Dept: FAMILY MEDICINE CLINIC | Facility: CLINIC | Age: 33
End: 2018-01-05

## 2018-01-05 VITALS
SYSTOLIC BLOOD PRESSURE: 118 MMHG | TEMPERATURE: 98.7 F | RESPIRATION RATE: 16 BRPM | WEIGHT: 230 LBS | BODY MASS INDEX: 31.15 KG/M2 | DIASTOLIC BLOOD PRESSURE: 80 MMHG | HEIGHT: 72 IN | OXYGEN SATURATION: 99 % | HEART RATE: 83 BPM

## 2018-01-05 DIAGNOSIS — M79.89 SWELLING OF RIGHT HAND: ICD-10-CM

## 2018-01-05 DIAGNOSIS — R20.2 NUMBNESS AND TINGLING OF RIGHT ARM: ICD-10-CM

## 2018-01-05 DIAGNOSIS — R20.0 NUMBNESS AND TINGLING OF RIGHT ARM: ICD-10-CM

## 2018-01-05 DIAGNOSIS — M25.511 ACUTE PAIN OF RIGHT SHOULDER: ICD-10-CM

## 2018-01-05 DIAGNOSIS — M54.2 NECK PAIN ON RIGHT SIDE: ICD-10-CM

## 2018-01-05 DIAGNOSIS — Z02.6 ENCOUNTER RELATED TO WORKER'S COMPENSATION CLAIM: Primary | ICD-10-CM

## 2018-01-05 PROCEDURE — 99213 OFFICE O/P EST LOW 20 MIN: CPT | Performed by: NURSE PRACTITIONER

## 2018-01-08 NOTE — PROGRESS NOTES
Subjective   Marko Jaquez is a 32 y.o. male who presents to the office for follow-up related to Worker's Comp.     History of Present Illness     This is a worker's comp visit.     11/21/17 Initial Visit for worker's comp: Patient works for [employer] in [place].  At work on November 15, 2017, patient was building a [place] and went to lift a 20-30 pound concrete block and felt his right shoulder pop.  Patient states that he notified his claim officer Pat Waldrop (who is now Zuleyma Samara).  Patient has had this happen in the past but he got better, he was completely fine and he did follow this through Workmen's Comp for that past incident.  Patient states that this time it is worse, patient states that over the past week since this happened he has had pain in his shoulder posterior and anteriorly and over the deltoid muscle, with pain radiating up to the right side of his neck and onto the back of his shoulder blade.  Pain is normally very minimal if patient is not moving his right arm, however when patient moves a pain jumps up to it 8 or 9 out of 10, patient states sharp pain shooting into the neck or back or shoulder blade.  Patient states that when this happened he went to the [facility] [place] [facility] ER, they did x-rays of his right shoulder which were negative, they told him to go four days without lifting anything and then to follow-up with his PCP.  Patient's primary PCP Lacey Munson, however patient was not able to get in with her and she does not do Worker's Comp.  Patient states that November 20th, his right hand has started to swell up and go numb, patient states that this is not related to movement, he can just happen when he is sitting completely still, develops over time and then goes away but has fluctuated back and forth and came back since yesterday and today off and on, patient states that the swelling and numbness is mainly in the thumb ring finger and middle finger of the right hand.  " Patient states that he normally takes 7.5 mg daily of mobic for bilateral knee pain because he has a history of arthritis in his knees.  He had been taking ibuprofen when necessary and applying Aspercreme OTC to the right shoulder which does help some with the pain.  Patient states that with his job if he is put on any work restrictions he will not be able to work. At initial appt, we prescribed lidocaine, increased Mobic from 7.5 to 15 mg daily, stop taking ibuprofen, prescribed baclofen prn. Ordered MRI. Wrote note for patient to have restrictions at work to not lift over five pounds with right arm for acute right shoulder pain and numbness of right arm.       Today, patient states pain is about the same except for yesterday it was almost unbearable but could be related to him being really cold that day when he was outside for longer than usual, baclofen helps TID prn, along with lidocaine prn and mobic 15 mg daily. Patient states pain is the least bothersome in the ams but increase throughout the day as he moves his arm more. Patient states he has not been lifting anything over five pounds with his right arm unless he is using his left arm to help. Patient states when he holds right forearm against his chest, the right shoulder \"pops and it relieves some pressure\" and that he also hears like a grinding sound. The swelling of right hand has decreased with minimal numbness of right hand but the incidence of numbness and swelling increase with use of right shoulder and arm. Patient states he can hold anything in his right arm but when he goes to lift or push and it moves his shoulder, it causing a significant amount of sharp pain in his right shoulder. Neck pain has been improving.     MRI done on 12/21/17 at [facility] of right shoulder with and without contrast. Results showed mild supraspinatus tendinosis and mild infraspinatus tendinosis with evidence of a small focal full-thickness perforation.  " Mild scapular stenosis.  Moderate before meals joint primary osteoarthritic changes are noted.  There is mildly enhancing bone marrow edema in the distal clavicle which may be related to degenerative changes or bone contusion if there is a history of trauma.  Mild edema and synovial prominence in the rotator interval is noted which can be associated with adhesive capsulitis.  Abnormal degenerative-appearing blunting suggestive of scarring of the anterior inferior labrum, with evidence of a tear of the posterior inferior labrum.     Patient needed form filled out for work for coverage for short-term disability to Worker's Comp injury, filled out her vital resection of paperwork, may copy for patient's chart, will be scanned into media.  Patient agreeable to getting physical therapy done and referring to orthopedic specialist to further evaluate plan of care.     This is a worker's comp visit.     The following portions of the patient's history were reviewed and updated as appropriate: allergies, current medications, past family history, past medical history, past social history, past surgical history and problem list.    Review of Systems   Musculoskeletal: Positive for arthralgias (bilateral knee, right shoulder) and neck pain (right sided).   Neurological: Positive for numbness. Negative for weakness.       Past Medical History:   Diagnosis Date   • Acute bronchitis    • Acute pharyngitis    • Backache    • Chronic low back pain    • Dysfunction of eustachian tube    • Exanthematous disorder    • Generalized abdominal pain    • Headache    • Health examination of defined sub-group    • Heat exhaustion    • Influenza- like illness    • Intervertebral disc prolapse    • Low back pain     with radiculopathy   • Malaise and fatigue    • Pain in elbow    • Pneumonia    • Sciatica    • Skin sensation disturbance        No family history on file.       Objective   /80  Pulse 83  Temp 98.7 °F (37.1 °C) (Oral)    "Resp 16  Ht 182.9 cm (72\")  Wt 104 kg (230 lb)  SpO2 99%  BMI 31.19 kg/m2  Physical Exam   Constitutional: He appears well-developed and well-nourished. No distress.   Cardiovascular: Normal rate, regular rhythm, normal heart sounds and intact distal pulses.  Exam reveals no gallop and no friction rub.    No murmur heard.  Pulmonary/Chest: Effort normal and breath sounds normal. No respiratory distress. He has no wheezes. He has no rales.   Musculoskeletal:        Right shoulder: He exhibits decreased range of motion, tenderness, bony tenderness, pain and decreased strength. He exhibits no swelling, no effusion, no crepitus, no deformity, no laceration, no spasm and normal pulse.        Cervical back: He exhibits tenderness (right side) and pain (right side). He exhibits normal range of motion, no bony tenderness, no swelling, no edema, no deformity, no laceration, no spasm and normal pulse.        Arms:  Psychiatric: He has a normal mood and affect. His behavior is normal.   Nursing note and vitals reviewed.       PHQ-2/PHQ-9 Depression Screening 1/5/2018   Little interest or pleasure in doing things 0   Feeling down, depressed, or hopeless 0   Total Score 0         Assessment/Plan   Marko was seen today for shoulder injury.    Diagnoses and all orders for this visit:    Encounter related to worker's compensation claim  -     Ambulatory Referral to Orthopedic Surgery  -     Ambulatory Referral to Physical Therapy    Acute pain of right shoulder  -     Ambulatory Referral to Orthopedic Surgery  -     Ambulatory Referral to Physical Therapy    Numbness and tingling of right arm  -     Ambulatory Referral to Orthopedic Surgery    Swelling of right hand  -     Ambulatory Referral to Orthopedic Surgery    Neck pain on right side      This is a worker's comp visit.      Patient is here today for follow-up for worker's compensation claim and related complaints of acute pain of right shoulder, numbness and tingling of " right arm, swelling of right hand, and right-sided neck pain.  Patient will continue lidocaine when necessary, baclofen when necessary, and Mobitz 15 mg daily.  Patient had MRI completed on 12/21/17.  Will refer to orthopedic specialist to further evaluate plan of care.  Will start patient with physical therapy.  Will follow-up in 2 weeks.    Patient educated to follow-up sooner than next scheduled appointment if condition(s) worse or do not improve. Patient states understanding and is in agreeance with plan of care. An After Visit Summary was printed and given to the patient.    This is worker's comp visit.       Current Outpatient Prescriptions:   •  baclofen (LIORESAL) 10 MG tablet, Take 1 tablet by mouth 3 (Three) Times a Day As Needed for Muscle Spasms., Disp: 90 tablet, Rfl: 0  •  Esomeprazole Magnesium (NEXIUM 24HR PO), Take  by mouth., Disp: , Rfl:   •  lidocaine (XYLOCAINE) 5 % ointment, Apply  topically At Night As Needed for Mild Pain  or Moderate Pain ., Disp: 50 g, Rfl: 0  •  meloxicam (MOBIC) 15 MG tablet, Take 1 tablet by mouth Daily., Disp: 30 tablet, Rfl: 0      CLOVIS Arreola        This document has been electronically signed by CLOVIS Arreola on January 8, 2018 1:52 PM      EMR/Transcription Dragon Disclaimer:  Some of this note may be an electronic dragon transcription/translation of spoken language to printed text. The electronic translation of spoken language may permit erroneous, or at times, nonsensical words or phrases to be inadvertently transcribed. Although I have reviewed the note for such errors, some may still exist.

## 2018-01-09 ENCOUNTER — CONSULT (OUTPATIENT)
Dept: PHYSICAL THERAPY | Facility: CLINIC | Age: 33
End: 2018-01-09

## 2018-01-09 DIAGNOSIS — M25.511 ACUTE SHOULDER PAIN DUE TO TRAUMA, RIGHT: Primary | ICD-10-CM

## 2018-01-09 DIAGNOSIS — G89.11 ACUTE SHOULDER PAIN DUE TO TRAUMA, RIGHT: Primary | ICD-10-CM

## 2018-01-09 PROCEDURE — 97001 PR PHYS THERAPY EVALUATION: CPT | Performed by: PHYSICAL THERAPIST

## 2018-01-09 NOTE — PROGRESS NOTES
Outpatient Physical Therapy Ortho Initial Evaluation       Patient Name: Marko Jaquez  : 1985  MRN: [id number]  Today's Date: 2018      Visit Date: 2018  Visit   Return to MD: 18  Re-cert date: 18  TIME IN 1311    TIME OUT 1400     There is no problem list on file for this patient.       Past Medical History:   Diagnosis Date   • Acute bronchitis    • Acute pharyngitis    • Backache    • Chronic low back pain    • Dysfunction of eustachian tube    • Exanthematous disorder    • Generalized abdominal pain    • Headache    • Health examination of defined sub-group    • Heat exhaustion    • Influenza- like illness    • Intervertebral disc prolapse    • Low back pain     with radiculopathy   • Malaise and fatigue    • Pain in elbow    • Pneumonia    • Sciatica    • Skin sensation disturbance         Past Surgical History:   Procedure Laterality Date   • INJECTION OF MEDICATION      Depo Medrol (80mg)   • INJECTION OF MEDICATION      Dexamethasone (5mg)   • INJECTION OF MEDICATION      Kenalog (40mg)   • INJECTION OF MEDICATION      Rocephin (1 gm)   • INJECTION OF MEDICATION      Toradol (60mg)       Visit Dx:     ICD-10-CM ICD-9-CM   1. Acute shoulder pain due to trauma, right M25.511 719.41    G89.11 338.11     Outpatient Medications     baclofen (LIORESAL) 10 MG tablet      Esomeprazole Magnesium (NEXIUM 24HR PO)      lidocaine (XYLOCAINE) 5 % ointment      meloxicam (MOBIC) 15 MG tablet      Allergies: Lortab    Subjective Evaluation    History of Present Illness  Mechanism of injury: 31 yo male with work related injury on 11/15/17 when he lifted a concrete block () and felt a pop in the right shoulder.  Went to the ER the same evening and had a x-ray negative for any pathology at the time. Went back to home and had onset of numbness in the R hand the next morning. Since then, the pain has worsened since onset.     Quality of life: good    Pain  Current pain  ratin  At worst pain ratin  Location: right shoulder  Quality: sharp  Alleviating factors: Ibuprofen, muscle relaxer, lidocaine cream.  Aggravating factors: movement and lifting (picking up 9 month old son)  Progression: worsening    Social Support  Lives in: trailer  Lives with: young children and spouse    Diagnostic Tests  MRI studies: abnormal (R shoulder: supraspinatus/infraspinatus tendinosis, post inferior labral tear)    Treatments  Previous treatment: injection treatment  Current treatment: medication and physical therapy  Patient Goals  Patient goals for therapy: increased motion, decreased pain, increased strength and return to work                RUE AROM (degrees)  R Shoulder Flexion  0-170: 152 Degrees  R Shoulder ABduction 0-140: 145 Degrees  R Shoulder Internal Rotation  0-70: 40 Degrees  R Shoulder External Rotation  0-90: 106 Degrees     RUE Strength  R Shoulder Flexion: 3+/5  R Shoulder ABduction: 3+/5  R Shoulder Internal Rotation: 5/5  R Shoulder External Rotation: 5/5  R Elbow Flexion: 5/5  R Elbow Extension: 5/5        LUE AROM (degrees)  L Shoulder Flexion  0-170: 166 Degrees  L Shoulder Extension  0-60: 170 Degrees  L Shoulder Internal Rotation  0-70: 60 Degrees  L Shoulder External Rotation  0-90: 90 Degrees     LUE Strength  LUE Overall Strength: Within Functional Limits - strength 5/5               Body Part  Body Part: Shoulder                     Shoulder Impingement/Rotator Cuff Special Tests  Empty Can Test (RC Lesion): Right:, Positive  Speed's Test (LH of Biceps Lesion): Right:, Negative           Shoulder Special Tests  Empty Can Test (RC Lesion): Right:, Positive  Speed's Test (LH of Biceps Lesion): Right:, Negative  [place]wa's Test: Right:, Positive                           Exercises       18 1400          Exercise 1    Exercise Name 1 shoulder ext stretch w/ dowel  -BS      Exercise 2    Exercise Name 2 R shoulder ext w/ self OP (leaning against hi/low table)  -BS       Exercise 3    Exercise Name 3 resisted rows w/ blue TB  -BS      Exercise 4    Exercise Name 4 wall push ups  -BS        User Key  (r) = Recorded By, (t) = Taken By, (c) = Cosigned By    Initials Name Provider Type    SUNIL Ferrell, PT Physical Therapist                      Assessment & Plan     Assessment  Impairments: abnormal or restricted ROM, activity intolerance, impaired physical strength, lacks appropriate home exercise program and pain with function  Assessment details: Acute right shoulder pain due to traumatic work related injury lifting a concrete block.  Prognosis: good  Functional Limitations: lifting, uncomfortable because of pain and reaching overhead  Goals  Plan Goals: STG's (2 weeks)  1. Pt I with HEP.  2. Improve right shoulder MMT (flex/abd) to 4/5.  3. Reduce right shoulder pain to 3/10 level with lifting 9 month old son.  4. Improve right shoulder IR AROM to 55 degrees    LTG's (4 weeks)  1. Improve right shoulder MMT (flex/abd) to 5/5  2. Reduce right shoulder pain to 1/10 level with lifting 9 month old son.  3. Improve right shoulder IR AROM to 70 degrees.    Plan  Therapy options: will be seen for skilled physical therapy services  Planned modality interventions: cryotherapy, electrical stimulation/Russian stimulation, thermotherapy (hydrocollator packs) and ultrasound  Planned therapy interventions: manual therapy, soft tissue mobilization, spinal/joint mobilization, stretching, strengthening, joint mobilization, home exercise program, functional ROM exercises and flexibility  Frequency: 2x week  Duration in weeks: 4  Treatment plan discussed with: patient        Time Calculation: 49        Quick Dash score 40%              Daniel Ferrell, PT  1/9/2018        Marko Jaquez    1985

## 2018-01-17 DIAGNOSIS — M25.511 ACUTE PAIN OF RIGHT SHOULDER: ICD-10-CM

## 2018-01-17 RX ORDER — MELOXICAM 15 MG/1
TABLET ORAL
Qty: 30 TABLET | Refills: 2 | Status: SHIPPED | OUTPATIENT
Start: 2018-01-17 | End: 2018-03-05 | Stop reason: SDUPTHER

## 2018-01-18 ENCOUNTER — TELEPHONE (OUTPATIENT)
Dept: FAMILY MEDICINE CLINIC | Facility: CLINIC | Age: 33
End: 2018-01-18

## 2018-01-18 NOTE — TELEPHONE ENCOUNTER
I faxed over the information to Workers Comp to try and get this patient's ortho referral approved.

## 2018-01-19 ENCOUNTER — OFFICE VISIT (OUTPATIENT)
Dept: FAMILY MEDICINE CLINIC | Facility: CLINIC | Age: 33
End: 2018-01-19

## 2018-01-19 VITALS
TEMPERATURE: 97.4 F | BODY MASS INDEX: 31.15 KG/M2 | DIASTOLIC BLOOD PRESSURE: 84 MMHG | SYSTOLIC BLOOD PRESSURE: 136 MMHG | RESPIRATION RATE: 18 BRPM | HEIGHT: 72 IN | HEART RATE: 101 BPM | WEIGHT: 230 LBS | OXYGEN SATURATION: 98 %

## 2018-01-19 DIAGNOSIS — Z02.6 ENCOUNTER RELATED TO WORKER'S COMPENSATION CLAIM: Primary | ICD-10-CM

## 2018-01-19 DIAGNOSIS — M25.511 ACUTE PAIN OF RIGHT SHOULDER: ICD-10-CM

## 2018-01-19 DIAGNOSIS — M54.2 NECK PAIN ON RIGHT SIDE: ICD-10-CM

## 2018-01-19 DIAGNOSIS — R20.2 NUMBNESS AND TINGLING OF RIGHT ARM: ICD-10-CM

## 2018-01-19 DIAGNOSIS — R20.0 NUMBNESS AND TINGLING OF RIGHT ARM: ICD-10-CM

## 2018-01-19 DIAGNOSIS — M79.89 SWELLING OF RIGHT HAND: ICD-10-CM

## 2018-01-19 PROCEDURE — 99213 OFFICE O/P EST LOW 20 MIN: CPT | Performed by: NURSE PRACTITIONER

## 2018-01-19 RX ORDER — BACLOFEN 10 MG/1
10 TABLET ORAL 3 TIMES DAILY PRN
Qty: 90 TABLET | Refills: 0 | Status: SHIPPED | OUTPATIENT
Start: 2018-01-19 | End: 2018-02-13

## 2018-01-22 ENCOUNTER — TREATMENT (OUTPATIENT)
Dept: PHYSICAL THERAPY | Facility: CLINIC | Age: 33
End: 2018-01-22

## 2018-01-22 DIAGNOSIS — G89.11 ACUTE SHOULDER PAIN DUE TO TRAUMA, RIGHT: Primary | ICD-10-CM

## 2018-01-22 DIAGNOSIS — M25.511 ACUTE SHOULDER PAIN DUE TO TRAUMA, RIGHT: Primary | ICD-10-CM

## 2018-01-22 PROBLEM — M54.2 NECK PAIN ON RIGHT SIDE: Status: ACTIVE | Noted: 2018-01-22

## 2018-01-22 PROBLEM — R20.2 NUMBNESS AND TINGLING OF RIGHT ARM: Status: ACTIVE | Noted: 2018-01-22

## 2018-01-22 PROBLEM — Z02.6 ENCOUNTER RELATED TO WORKER'S COMPENSATION CLAIM: Status: ACTIVE | Noted: 2018-01-22

## 2018-01-22 PROBLEM — R20.0 NUMBNESS AND TINGLING OF RIGHT ARM: Status: ACTIVE | Noted: 2018-01-22

## 2018-01-22 PROBLEM — M79.89 SWELLING OF RIGHT HAND: Status: ACTIVE | Noted: 2018-01-22

## 2018-01-22 PROCEDURE — 97110 THERAPEUTIC EXERCISES: CPT | Performed by: PHYSICAL THERAPIST

## 2018-01-22 PROCEDURE — 97014 ELECTRIC STIMULATION THERAPY: CPT | Performed by: PHYSICAL THERAPIST

## 2018-01-22 PROCEDURE — 97140 MANUAL THERAPY 1/> REGIONS: CPT | Performed by: PHYSICAL THERAPIST

## 2018-01-22 NOTE — PROGRESS NOTES
Daily Treatment Note    Time In: 1234     Time Out: 1327    ICD-10-CM ICD-9-CM   1. Acute shoulder pain due to trauma, right M25.511 719.41    G89.11 338.11       Subjective   Pt reports doing HEP 2x/day, less pain lifting 20 lb toddler today.  Patient reports to therapy 2-3/10 pain, and  0% improvement.  Attendance  2/2 visits. Recert 1/30 . MD f/u TBPREMA.      Objective        AROM: deferred testing                    PROCEDURES AND MODALITIES:            Ice  Ice Applied: Yes  Location: R shoulder  Rx Minutes: 15 mins  Ice S/P Rx: Yes    Electrical Stimulation  Stimulation Type: IFC  Location/Electrode Placement/Other: R shoulder  Rx Minutes: 15 mins                   EXERCISE  Exercise 1  Exercise Name 1: shoulder ext stretch w/ dowel  Sets/Reps 1: 1/15  Exercise 2  Sets/Reps 2: 1/15-increased R shoulder pain Exercise 3  Exercise Name 3: right shoulder IR w/ self OP (S/L)  Sets/Reps 3: 2/15   Exercise 5  Exercise Name 5: Pro II, level 4  Time 5: 5' Exercise 6  Exercise Name 6: posterior capsule stretch  Sets/Reps 6: 1/20                                             MANUAL PT:  Manual Rx 1 Location: R shoulder  Manual Rx 1 Type: ant glides  Manual Rx 1 Grade: III  Manual Rx 1 Duration: 4'  Manual Rx 2 Location: S/L scapular mobs  Manual Rx 2 Grade: III  Manual Rx 2 Duration: 3  Manual Rx 3 Location: R shoulder  Manual Rx 3 Type: post glides  Manual Rx 3 Grade: II and III  Manual Rx 3 Duration: 2'   Manual Rx 4 Location: R shoulder   Manual Rx 4 Type: IR w/ clinician OP  Manual Rx 4 Duration: 2' total       Manual PT [id number] 11 minutes, Therapy Exercise [id number] 27 minutes and Other Procedure CPT 15 minutes unattended estim    Total Treatment Time: 53 Minutes    Assessment/Plan   Pt with minimal change in right shoulder pain with abduction > 90 degrees, crepitus and pain felt at that point. Reduced right shoulder pain with passive IR with anterior R GH glides (manual therapy).   Address manual therapy with follow up  sessions using anterior glides to facilitate improved R shoulder ROM. No lifting > 5 lbs per referring provider orders.           Daniel Ferrell, PT  Physical Therapist

## 2018-01-22 NOTE — PROGRESS NOTES
Subjective   Marko Jaquez is a 32 y.o. male who presents to the office for F/U of worker's comp.     History of Present Illness     This is a worker's comp visit.      11/21/17 Initial Visit for worker's comp: Patient works for [employer] in [facility].  At work on November 15, 2017, patient was building a [place] and went to lift a 20-30 pound concrete block and felt his right shoulder pop.  Patient states that he notified his claim officer Pat Waldrop (who is now Zuleyma Pascual).  Patient has had this happen in the past but he got better, he was completely fine and he did follow this through Workmen's Comp for that past incident.  Patient states that this time it is worse, patient states that over the past week since this happened he has had pain in his shoulder posterior and anteriorly and over the deltoid muscle, with pain radiating up to the right side of his neck and onto the back of his shoulder blade.  Pain is normally very minimal if patient is not moving his right arm, however when patient moves a pain jumps up to it 8 or 9 out of 10, patient states sharp pain shooting into the neck or back or shoulder blade.  Patient states that when this happened he went to the [facility] ER, they did x-rays of his right shoulder which were negative, they told him to go four days without lifting anything and then to follow-up with his PCP.  Patient's primary PCP Lacey Munson, however patient was not able to get in with her and she does not do Worker's Comp.  Patient states that November 20th, his right hand has started to swell up and go numb, patient states that this is not related to movement, he can just happen when he is sitting completely still, develops over time and then goes away but has fluctuated back and forth and came back since yesterday and today off and on, patient states that the swelling and numbness is mainly in the thumb ring finger and middle finger of the right hand.  Patient states  "that he normally takes 7.5 mg daily of mobic for bilateral knee pain because he has a history of arthritis in his knees.  He had been taking ibuprofen when necessary and applying Aspercreme OTC to the right shoulder which does help some with the pain.  Patient states that with his job if he is put on any work restrictions he will not be able to work. At initial appt, we prescribed lidocaine, increased Mobic from 7.5 to 15 mg daily, stop taking ibuprofen, prescribed baclofen prn. Ordered MRI. Wrote note for patient to have restrictions at work to not lift over five pounds with right arm for acute right shoulder pain and numbness of right arm.       MRI done on 12/21/17 at [facility] of right shoulder with and without contrast. Results showed mild supraspinatus tendinosis and mild infraspinatus tendinosis with evidence of a small focal full-thickness perforation.  Mild scapular stenosis.  Moderate before meals joint primary osteoarthritic changes are noted.  There is mildly enhancing bone marrow edema in the distal clavicle which may be related to degenerative changes or bone contusion if there is a history of trauma.  Mild edema and synovial prominence in the rotator interval is noted which can be associated with adhesive capsulitis.  Abnormal degenerative-appearing blunting suggestive of scarring of the anterior inferior labrum, with evidence of a tear of the posterior inferior labrum.     Today, patient states pain is about the same with good and bad days. Baclofen helps TID prn, along with lidocaine prn and mobic 15 mg daily. Patient states pain is the least bothersome in the ams but increase throughout the day as he moves his arm more. Patient states he has not been lifting anything over five pounds with his right arm unless he is using his left arm to help. Patient states when he holds right forearm against his chest, the right shoulder \"pops and it relieves some pressure\" and that he also hears " "like a grinding sound. The swelling of right hand has decreased with minimal numbness of right hand but the incidence of numbness and swelling increases with use of right shoulder and arm. Patient states he can hold anything in his right arm but when he goes to lift or push or if he moves his shoulder, it causing a significant amount of sharp pain in his right shoulder. Right sided neck pain still present but mild and still thinking it is coming from his right shoulder as radiating pain. At last appt, 1/5/19, pt was referred to ortho and PT. Still waiting on worker's comp to approve ortho referral. PT consult done on 1/9/19 and recommended patient has PT 2x/week x 4weeks. Pt states he needs a refill on baclofen.       This is a worker's comp visit.        The following portions of the patient's history were reviewed and updated as appropriate: allergies, current medications, past family history, past medical history, past social history, past surgical history and problem list.    Review of Systems   Musculoskeletal: Positive for arthralgias (bilateral knee, right shoulder) and neck pain (right sided).   Neurological: Positive for numbness. Negative for weakness.       Past Medical History:   Diagnosis Date   • Acute bronchitis    • Acute pharyngitis    • Backache    • Chronic low back pain    • Dysfunction of eustachian tube    • Exanthematous disorder    • Generalized abdominal pain    • Headache    • Health examination of defined sub-group    • Heat exhaustion    • Influenza- like illness    • Intervertebral disc prolapse    • Low back pain     with radiculopathy   • Malaise and fatigue    • Pain in elbow    • Pneumonia    • Sciatica    • Skin sensation disturbance        History reviewed. No pertinent family history.       Objective   /84  Pulse 101  Temp 97.4 °F (36.3 °C) (Temporal Artery )   Resp 18  Ht 182.9 cm (72\")  Wt 104 kg (230 lb)  SpO2 98%  BMI 31.19 kg/m2  Physical Exam   Constitutional: " He appears well-developed and well-nourished. No distress.   Cardiovascular: Normal rate, regular rhythm, normal heart sounds and intact distal pulses.  Exam reveals no gallop and no friction rub.    No murmur heard.  Pulmonary/Chest: Effort normal and breath sounds normal. No respiratory distress. He has no wheezes. He has no rales.   Musculoskeletal:        Right shoulder: He exhibits decreased range of motion, tenderness, bony tenderness, pain and decreased strength. He exhibits no swelling, no effusion, no crepitus, no deformity, no laceration, no spasm and normal pulse.        Cervical back: He exhibits tenderness (right side) and pain (right side). He exhibits normal range of motion, no bony tenderness, no swelling, no edema, no deformity, no laceration, no spasm and normal pulse.        Arms:  Psychiatric: He has a normal mood and affect. His behavior is normal.   Nursing note and vitals reviewed.       PHQ-2/PHQ-9 Depression Screening 1/19/2018   Little interest or pleasure in doing things 0   Feeling down, depressed, or hopeless 0   Total Score 0         Assessment/Plan   Marko was seen today for follow-up.    Diagnoses and all orders for this visit:    Encounter related to worker's compensation claim    Acute pain of right shoulder  -     baclofen (LIORESAL) 10 MG tablet; Take 1 tablet by mouth 3 (Three) Times a Day As Needed for Muscle Spasms.    Numbness and tingling of right arm    Neck pain on right side    Swelling of right hand    This is a worker's comp visit.     Pt here for worker's comp accident with acute pain of right shoulder, numbness and tingling of right arm, right sided neck pain, and swelling of right hand. Patient currently on baclofen 10mg TID prn, lidocaine prn, and mobic 15 mg daily. Refilled baclofen. Pt will continue restrictions of not lifting anything over five pounds, will continue pt, and will F/U after appt with ortho.     Patient educated to follow-up sooner than next  scheduled appointment if condition(s) worse or do not improve. Patient states understanding and is in agreeance with plan of care. An After Visit Summary was printed and given to the patient.    This is a worker's comp visit.     Current Outpatient Prescriptions:   •  baclofen (LIORESAL) 10 MG tablet, Take 1 tablet by mouth 3 (Three) Times a Day As Needed for Muscle Spasms., Disp: 90 tablet, Rfl: 0  •  Esomeprazole Magnesium (NEXIUM 24HR PO), Take  by mouth., Disp: , Rfl:   •  lidocaine (XYLOCAINE) 5 % ointment, Apply  topically At Night As Needed for Mild Pain  or Moderate Pain ., Disp: 50 g, Rfl: 0  •  meloxicam (MOBIC) 15 MG tablet, TAKE 1 TABLET BY MOUTH DAILY, Disp: 30 tablet, Rfl: 2      CLOVIS Arreola        This document has been electronically signed by CLOVIS Arreola on January 22, 2018 8:52 AM      EMR/Transcription Dragon Disclaimer:  Some of this note may be an electronic dragon transcription/translation of spoken language to printed text. The electronic translation of spoken language may permit erroneous, or at times, nonsensical words or phrases to be inadvertently transcribed. Although I have reviewed the note for such errors, some may still exist.

## 2018-01-25 ENCOUNTER — TREATMENT (OUTPATIENT)
Dept: PHYSICAL THERAPY | Facility: CLINIC | Age: 33
End: 2018-01-25

## 2018-01-25 DIAGNOSIS — M25.511 ACUTE SHOULDER PAIN DUE TO TRAUMA, RIGHT: Primary | ICD-10-CM

## 2018-01-25 DIAGNOSIS — G89.11 ACUTE SHOULDER PAIN DUE TO TRAUMA, RIGHT: Primary | ICD-10-CM

## 2018-01-25 PROCEDURE — 97014 ELECTRIC STIMULATION THERAPY: CPT | Performed by: PHYSICAL THERAPIST

## 2018-01-25 PROCEDURE — 97110 THERAPEUTIC EXERCISES: CPT | Performed by: PHYSICAL THERAPIST

## 2018-01-25 NOTE — PROGRESS NOTES
"Daily Treatment Note    Time In: 14:00      Time Out: 14:47    ICD-10-CM ICD-9-CM   1. Acute shoulder pain due to trauma, right M25.511 719.41    G89.11 338.11       Subjective   Pt reports shld gradually feeling better. He has some pain post treatments.   Patient reports to therapy 3/10 pain, and some improvement.  Attendance  3/3 visits. Recert 1/30. MD f/u TBD.      Objective     V cues necessary for correct TE performance. AAROM flex and abd WFL's. Pain 6/10 by end of TE. Post treatment pain 3/10.     PROCEDURES AND MODALITIES:     Ice  Ice Applied: Yes  Location: R shoulder  Rx Minutes: 15 mins  Ice S/P Rx: Yes    Electrical Stimulation  Stimulation Type: IFC  Location/Electrode Placement/Other: R shoulder  Rx Minutes: 15 mins       EXERCISE  Exercise 1  Exercise Name 1: PRO2  UE's  Equipment/Resistance 1: 3.0  Time: 6'  Exercise 2  Exercise Name 2: Pulleys  Sets/Reps 2: 2' Exercise 3  Exercise Name 3: Wand flex, abd, ext  Sets/Reps 3: 10x ea Exercise 4  Exercise Name 4: Supine protraction  Equipment/Resistance 4: 3#  Sets/Reps 4: 20x Exercise 5  Exercise Name 5: S/L ER  Equipment/Resistance 5: 2# Exercise 6  Exercise Name 6: B shld ext  Equipment/Resistance 6: yellow tb  Sets/Reps 6: 20x   Exercise 7  Exercise Name 7: Rope rows  Equipment/Resistance 7: 35#  Sets/Reps 7: 20x Exercise 8  Exercise Name 8: Post shld stretch  Sets/Reps 8: 2/30\"                                         Therapy Exercise 43550 32 minutes and Other Procedure CPT 15 minutes Electrical Stimulation    Total Treatment Time: 47 Minutes    Assessment/Plan   Fair tolerance of today's treatment with increased TE intensity.   Progress per Plan of Care             Eliazar Mann, PTA  Physical Therapist    "

## 2018-01-30 ENCOUNTER — TREATMENT (OUTPATIENT)
Dept: PHYSICAL THERAPY | Facility: CLINIC | Age: 33
End: 2018-01-30

## 2018-01-30 DIAGNOSIS — M25.511 ACUTE SHOULDER PAIN DUE TO TRAUMA, RIGHT: Primary | ICD-10-CM

## 2018-01-30 DIAGNOSIS — G89.11 ACUTE SHOULDER PAIN DUE TO TRAUMA, RIGHT: Primary | ICD-10-CM

## 2018-01-30 PROCEDURE — 97014 ELECTRIC STIMULATION THERAPY: CPT | Performed by: PHYSICAL THERAPIST

## 2018-01-30 PROCEDURE — 97110 THERAPEUTIC EXERCISES: CPT | Performed by: PHYSICAL THERAPIST

## 2018-01-30 NOTE — PROGRESS NOTES
Daily Treatment Note    Time In: 11:00     Time Out: 11:50    ICD-10-CM ICD-9-CM   1. Acute shoulder pain due to trauma, right M25.511 719.41    G89.11 338.11       Subjective   Pt reports mild shld pain. He thinks shld is getting better.   Patient reports to therapy 2/10 pain.  40% improvement. Attendance  4/5 visits. Recert 1/30. MD f/u DAVID.      Objective     V cues necessary for correct TE performance. Post TE pain 4-5/10. Post treatment pain 3/10.      PROCEDURES AND MODALITIES:     Ice  Ice Applied: Yes  Location: R shoulder  Rx Minutes: 15 mins  Ice S/P Rx: Yes    Electrical Stimulation  Stimulation Type: IFC  Max mAmp: 9  Location/Electrode Placement/Other: R shoulder  Rx Minutes: 15 mins       EXERCISE  Exercise 1  Exercise Name 1: PRO2  UE's  Equipment/Resistance 1: 3.0  Exercise 2  Exercise Name 2: Wand flex, abd, ext  Sets/Reps 2: 10x ea Exercise 3  Exercise Name 3: Supine protraction  Equipment/Resistance 3: 4#  Sets/Reps 3: 30x Exercise 4  Exercise Name 4: S/L ER  Equipment/Resistance 4: 3#  Sets/Reps 4: 30x Exercise 5  Exercise Name 5: Supine shld iso @ 90°  Equipment/Resistance 5: yellow tb, flexion  Sets/Reps 5: 2 sets Exercise 6  Exercise Name 6: S/L abd iso @ 90°  Equipment/Resistance 6: yellow tb  Sets/Reps 6: 2 sets   Exercise 7  Exercise Name 7: B shld ext  Equipment/Resistance 7: red tb  Sets/Reps 7: 25x Exercise 8  Exercise Name 8: Pendulums  Equipment/Resistance 8: 3#  Sets/Reps 8: 20x ea                                         Therapy Exercise [id number] 35 minutes and Other Procedure CPT 15 minutes Electrical Stimulation    Total Treatment Time: 50 Minutes    Assessment/Plan   Good sub reports of % improvement.  Pt continues to benefit from PT for further progression towards goals.  Progress per Plan of Care             Eliazar Mann, PTA  Physical Therapist

## 2018-02-02 ENCOUNTER — TREATMENT (OUTPATIENT)
Dept: PHYSICAL THERAPY | Facility: CLINIC | Age: 33
End: 2018-02-02

## 2018-02-02 DIAGNOSIS — M25.511 ACUTE SHOULDER PAIN DUE TO TRAUMA, RIGHT: Primary | ICD-10-CM

## 2018-02-02 DIAGNOSIS — G89.11 ACUTE SHOULDER PAIN DUE TO TRAUMA, RIGHT: Primary | ICD-10-CM

## 2018-02-02 PROCEDURE — 97014 ELECTRIC STIMULATION THERAPY: CPT | Performed by: PHYSICAL THERAPIST

## 2018-02-02 PROCEDURE — 97110 THERAPEUTIC EXERCISES: CPT | Performed by: PHYSICAL THERAPIST

## 2018-02-02 NOTE — PROGRESS NOTES
"Daily Treatment Note    Time In: 8:00      Time Out: 8:53    ICD-10-CM ICD-9-CM   1. Acute shoulder pain due to trauma, right M25.511 719.41    G89.11 338.11       Subjective   Pt reports mild pain this morning. He had sharp pains all day yesterday at ant/sup shld for unknown reason.   Patient reports to therapy 2/10 pain, and 40% improvement.  Attendance  5/7 visits. Recert next visit. MD meehan/trista 2/13 with Dr Miller.      Objective     V cues necessary for correct TE performance. Post TE pain 5/10. Post treatment pain decreased.     AROM:    Flex, abd WFL's.      PROCEDURES AND MODALITIES:     Ice  Ice Applied: Yes  Location: R shoulder  Rx Minutes: 15 mins  Ice S/P Rx: Yes    Electrical Stimulation  Stimulation Type: IFC  Max mAmp: 9  Location/Electrode Placement/Other: R shoulder  Rx Minutes: 15 mins       EXERCISE  Exercise 1  Exercise Name 1: PRO2  UE's  Equipment/Resistance 1: 3.0  Time: 8.5'  Exercise 2  Exercise Name 2: Manual shld: post mob, inf glide mob, IR  Time 2: 4' Exercise 3  Exercise Name 3: NO$  Equipment/Resistance 3: yellow tb  Sets/Reps 3: 2/15x Exercise 4  Exercise Name 4: Rope rows  Equipment/Resistance 4: 45#  Sets/Reps 4: 30x Exercise 5  Exercise Name 5: B Shld ext  Equipment/Resistance 5: green tb  Sets/Reps 5: 30x Exercise 6  Exercise Name 6: Doorway pec stretch  Sets/Reps 6: 2/30\"   Exercise 7  Exercise Name 7: TB Flexion  Equipment/Resistance 7: yellow  Sets/Reps 7: 20x,  Exercise 8  Exercise Name 8: Pendulums  Equipment/Resistance 8: 3#  Sets/Reps 8: 30x ea                                     Therapy Exercise 13677 38 minutes and Other Procedure CPT 15 minutes Electrical Stimulation    Total Treatment Time: 53 Minutes    Assessment/Plan   Shld ROM improved. Good tolerance of today's treatment. Some continued IR reactivity. Pt continues to benefit from PT for further progression towards goals.  Progress per Plan of Care             Eliazar Mann, PTA  Physical Therapist    "

## 2018-02-06 ENCOUNTER — TREATMENT (OUTPATIENT)
Dept: PHYSICAL THERAPY | Facility: CLINIC | Age: 33
End: 2018-02-06

## 2018-02-06 DIAGNOSIS — G89.11 ACUTE SHOULDER PAIN DUE TO TRAUMA, RIGHT: Primary | ICD-10-CM

## 2018-02-06 DIAGNOSIS — M25.511 ACUTE SHOULDER PAIN DUE TO TRAUMA, RIGHT: Primary | ICD-10-CM

## 2018-02-06 PROCEDURE — 97140 MANUAL THERAPY 1/> REGIONS: CPT | Performed by: PHYSICAL THERAPIST

## 2018-02-06 PROCEDURE — 97014 ELECTRIC STIMULATION THERAPY: CPT | Performed by: PHYSICAL THERAPIST

## 2018-02-06 PROCEDURE — 97110 THERAPEUTIC EXERCISES: CPT | Performed by: PHYSICAL THERAPIST

## 2018-02-06 NOTE — PROGRESS NOTES
"Recertification    Diagnosis/ICD-10 Code:  Acute shoulder pain due to trauma, right [M25.511, G89.11]  Referring practitioner: CLOVIS Arreola  Date of Initial Visit: 2/6/2018  Patient seen for 6/8 sessions 8 MD isabella 2/13  Dr. Miller consult.  recert due 2./27  Time in: 12:29                                Time Out  13:30  Subjective:   Marko Jaquez states: 4 pain,  No improvment. Good days and bad days . Pain with lifting    Post treatment pain 2-3/10  Objective:   Current condition: Good  Test measurement: IR 45 deg, flexion 118   ,abd   105, MMT flexion 4- flexion, 4 abd     PROCEDURES AND MODALITIES:     Ice  Location: R shoulder  Rx Minutes: 15 mins  Ice S/P Rx: Yes    Electrical Stimulation  Stimulation Type: IFC  Max mAmp: 9  Location/Electrode Placement/Other: R shoulder  Rx Minutes: 15 mins       EXERCISE 23  Exercise 1  Exercise Name 1: PRO2  UE's  Equipment/Resistance 1: 3.0  Time: 8.5'  Exercise 2  Exercise Name 2: see manual Exercise 3  Exercise Name 3: NO$  Equipment/Resistance 3: yellow tb  Sets/Reps 3: 2/10x Exercise 4  Exercise Name 4: Rope rows  Equipment/Resistance 4: 45#  Sets/Reps 4: 20x Exercise 5  Exercise Name 5: B Shld ext  Equipment/Resistance 5: green tb  Sets/Reps 5: 20x Exercise 6  Exercise Name 6: Doorway pec stretch  Sets/Reps 6: 2/30\"   Exercise 7  Exercise Name 7: TB clocks  Equipment/Resistance 7: yellow  Sets/Reps 7: 20x,  Exercise 8  Exercise Name 8: Pendulums  Equipment/Resistance 8: 3#  Sets/Reps 8: 30x ea Exercise 9  Exercise Name 9: wall push ups  Sets/Reps 9: 20 Exercise 10  Exercise Name 10: reverse corners  Sets/Reps 10: 20                                   MANUAL PT:  Manual Rx 1 Location: R shoulder  Manual Rx 1 Type: ant glides/inf glides/post  Manual Rx 1 Duration: 6  Manual Rx 2 Location: S/L scapular mobs  Manual Rx 2 Duration: 4  Manual Rx 3 Location:  r shoulder oscillations  Manual Rx 3 Duration: 2'   Manual Rx 4 Location: R shoulder   Manual Rx 4 Type: " PROM/ stretches  Manual Rx 4 Duration: 3    Assessment:   Summary of Treatment:  See above  Progress toward previous goals: Continue STG/LTG below        Plan:   Goals  Plan Goals: STG's (2 weeks)  1. Pt I with HEP. Progressing.  2. Improve right shoulder MMT (flex/abd) to 4/5. Progressing.  3. Reduce right shoulder pain to 3/10 level with lifting 9 month old son. Not met  4. Improve right shoulder IR AROM to 55 degrees. not met    LTG's (4 weeks)  1. Improve right shoulder MMT (flex/abd) to 5/5  2. Reduce right shoulder pain to 1/10 level with lifting 9 month old son.  3. Improve right shoulder IR AROM to 70 degrees.    Timeframe: 1 week  Prognosis to achieve goals: good    Plan  Treatment plan with rationale: The patient is to  be seen 2 time(s) per week, for 1 week(s) to progress toward short and long term goals.  Recommendations: Initiate/continue PT services [facility] consult next week. Progression per his recommendations    PT Signature: Marichuy Mccloud, PT, ATC, DPT      Based upon review of the patient's progress and continued therapy plan, it is my medical opinion that Marko Jaquez should continue physical therapy treatment at [facility]  [address] [place] [address] Dr Priyanka DIALLO [id number] [phone].    Signature:  CLOVIS Arreola

## 2018-02-08 ENCOUNTER — TREATMENT (OUTPATIENT)
Dept: PHYSICAL THERAPY | Facility: CLINIC | Age: 33
End: 2018-02-08

## 2018-02-08 DIAGNOSIS — G89.11 ACUTE SHOULDER PAIN DUE TO TRAUMA, RIGHT: Primary | ICD-10-CM

## 2018-02-08 DIAGNOSIS — M25.511 ACUTE SHOULDER PAIN DUE TO TRAUMA, RIGHT: Primary | ICD-10-CM

## 2018-02-08 PROCEDURE — 97110 THERAPEUTIC EXERCISES: CPT | Performed by: PHYSICAL THERAPIST

## 2018-02-08 PROCEDURE — 97140 MANUAL THERAPY 1/> REGIONS: CPT | Performed by: PHYSICAL THERAPIST

## 2018-02-08 PROCEDURE — 97014 ELECTRIC STIMULATION THERAPY: CPT | Performed by: PHYSICAL THERAPIST

## 2018-02-08 NOTE — PROGRESS NOTES
Daily Treatment Note    Time In: 1102     Time Out: 1158    ICD-10-CM ICD-9-CM   1. Acute shoulder pain due to trauma, right M25.511 719.41    G89.11 338.11       Subjective   Pt reports no consistent change overall, feels a bit more ROM with the shoulder since starting PT.  Patient reports to therapy 4/10 pain, and  0% improvement.  Attendance  7/9 visits. Recert 2/27. MD f/u 2;/13.      Objective        AROM:       deferred testing R shoulder                PROCEDURES AND MODALITIES:            Ice  Location: R shoulder  Rx Minutes: 15 mins  Ice S/P Rx: Yes    Electrical Stimulation  Stimulation Type: IFC  Max mAmp: 11  Location/Electrode Placement/Other: R shoulder  Rx Minutes: 15 mins                   EXERCISE  Exercise 1  Exercise Name 1: PRO2  UE's  Equipment/Resistance 1: 4.0  Time: 8'  Exercise 2  Exercise Name 2: supine posterior capsule stretch  Sets/Reps 2: 1/20 Exercise 3  Exercise Name 3: supine PROM w/ R shoulder   Equipment/Resistance 3: flex/abd/ER/IR  Sets/Reps 3: 6' total Exercise 4  Exercise Name 4: resisted rows w/ blue TB  Sets/Reps 4: 1/20 Exercise 5  Exercise Name 5: resisted B shoulder ext w/ blue TB  Sets/Reps 5: 1/20 Exercise 6  Exercise Name 6: wall push ups  Sets/Reps 6: 1/10   Exercise 7  Exercise Name 7: chair push ups  Sets/Reps 7: 1/15 Exercise 8  Exercise Name 8: standing resisted R shoulder flex  Equipment/Resistance 8: 2#  Sets/Reps 8: 1/15                                       MANUAL PT:  Manual Rx 1 Location: R shoulder   Manual Rx 1 Type: ant glides  Manual Rx 1 Grade: grade III and IV  Manual Rx 1 Duration: 4'  Manual Rx 2 Location: R shoulder horiz add w/ clinician OP  Manual Rx 2 Duration: 3'  Manual Rx 3 Location: R shoulder IR w/ clinician OP  Manual Rx 3 Duration: 2'       Manual PT [id number] 9 minutes, Therapy Exercise [id number] 32 minutes and Other Procedure CPT unattended estim minutes 15 minutes    Total Treatment Time: 56 Minutes    Assessment/Plan   Pt with reduced R  shoulder pain after session. Most limited in R shoulder IR   Progress per Plan of Care             Daniel Ferrell, PT  Physical Therapist

## 2018-02-12 ENCOUNTER — TREATMENT (OUTPATIENT)
Dept: PHYSICAL THERAPY | Facility: CLINIC | Age: 33
End: 2018-02-12

## 2018-02-12 DIAGNOSIS — M25.511 ACUTE SHOULDER PAIN DUE TO TRAUMA, RIGHT: Primary | ICD-10-CM

## 2018-02-12 DIAGNOSIS — G89.11 ACUTE SHOULDER PAIN DUE TO TRAUMA, RIGHT: Primary | ICD-10-CM

## 2018-02-12 PROCEDURE — 97014 ELECTRIC STIMULATION THERAPY: CPT | Performed by: PHYSICAL THERAPIST

## 2018-02-12 PROCEDURE — 97110 THERAPEUTIC EXERCISES: CPT | Performed by: PHYSICAL THERAPIST

## 2018-02-13 ENCOUNTER — OFFICE VISIT (OUTPATIENT)
Dept: ORTHOPEDIC SURGERY | Facility: CLINIC | Age: 33
End: 2018-02-13

## 2018-02-13 VITALS — HEIGHT: 72 IN | WEIGHT: 236 LBS | BODY MASS INDEX: 31.97 KG/M2
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
## 2018-02-13 DIAGNOSIS — M25.511 ACUTE PAIN OF RIGHT SHOULDER: Primary | ICD-10-CM

## 2018-02-13 DIAGNOSIS — M75.111 INCOMPLETE TEAR OF RIGHT ROTATOR CUFF: ICD-10-CM

## 2018-02-13 DIAGNOSIS — M75.41 IMPINGEMENT SYNDROME OF RIGHT SHOULDER: ICD-10-CM

## 2018-02-13 PROCEDURE — 99203 OFFICE O/P NEW LOW 30 MIN: CPT | Performed by: ORTHOPAEDIC SURGERY

## 2018-02-13 PROCEDURE — 20610 DRAIN/INJ JOINT/BURSA W/O US: CPT | Performed by: ORTHOPAEDIC SURGERY

## 2018-02-13 RX ORDER — UBIDECARENONE 100 MG
100 CAPSULE ORAL DAILY
COMMUNITY
End: 2018-05-03

## 2018-02-13 RX ADMIN — TRIAMCINOLONE ACETONIDE 40 MG: 40 INJECTION, SUSPENSION INTRA-ARTICULAR; INTRAMUSCULAR at 14:51

## 2018-02-13 RX ADMIN — LIDOCAINE HYDROCHLORIDE 2 ML: 10 INJECTION, SOLUTION INFILTRATION; PERINEURAL at 14:51

## 2018-02-13 NOTE — PROGRESS NOTES
Marko Jaquez is a 32 y.o. male   Primary provider:  CLOVIS Ashby       Chief Complaint   Patient presents with   • Right Shoulder - Pain       HISTORY OF PRESENT ILLNESS: patient injured right shoulder on 11/15/2017 , patient was building a [employer] and went to lift a 20-30 pound concrete block and felt his right shoulder pop. physical therapy at [facility]  Patient has completed 8 visits. Patient is currently off from work due to restrictions. Mri done at [facility], patient did not  Bring mri disc.     Pain   The current episode started more than 1 month ago. The problem occurs constantly. The problem has been unchanged. Associated symptoms include numbness. Associated symptoms comments: Grinding, stabbing, aching, clicking, swelling. . Exacerbated by: driving.  He has tried NSAIDs for the symptoms.    He does not report specific night pain.  He states that he has had some numbness and tingling but not recently.  He has occasional sharp pains in his arm that go down from the shoulder to his elbow.  He states that has been a little bit better over the last couple of weeks.  He is not currently working.  He also reports episode of hitting his head on the canopy while using his machine at work.     CONCURRENT MEDICAL HISTORY:    Past Medical History:   Diagnosis Date   • Acute bronchitis    • Acute pharyngitis    • Backache    • Chronic low back pain    • Dysfunction of eustachian tube    • Exanthematous disorder    • Generalized abdominal pain    • Headache    • Health examination of defined sub-group    • Heat exhaustion    • Influenza- like illness    • Intervertebral disc prolapse    • Low back pain     with radiculopathy   • Malaise and fatigue    • Pain in elbow    • Pneumonia    • Sciatica    • Skin sensation disturbance        Allergies   Allergen Reactions   • Lortab [Hydrocodone-Acetaminophen]          Current Outpatient Prescriptions:   •  coenzyme Q10 100 MG capsule, Take 100 mg by mouth  "Daily., Disp: , Rfl:   •  Esomeprazole Magnesium (NEXIUM 24HR PO), Take  by mouth., Disp: , Rfl:   •  lidocaine (XYLOCAINE) 5 % ointment, Apply  topically At Night As Needed for Mild Pain  or Moderate Pain ., Disp: 50 g, Rfl: 0  •  meloxicam (MOBIC) 15 MG tablet, TAKE 1 TABLET BY MOUTH DAILY, Disp: 30 tablet, Rfl: 2    Past Surgical History:   Procedure Laterality Date   • INJECTION OF MEDICATION      Depo Medrol (80mg)   • INJECTION OF MEDICATION      Dexamethasone (5mg)   • INJECTION OF MEDICATION      Kenalog (40mg)   • INJECTION OF MEDICATION      Rocephin (1 gm)   • INJECTION OF MEDICATION      Toradol (60mg)       History reviewed. No pertinent family history.     Social History     Social History   • Marital status:      Spouse name: N/A   • Number of children: N/A   • Years of education: N/A     Occupational History   • Not on file.     Social History Main Topics   • Smoking status: Current Every Day Smoker     Packs/day: 1.00     Types: Cigarettes   • Smokeless tobacco: Never Used   • Alcohol use Yes      Comment: occasional use   • Drug use: No   • Sexual activity: Defer     Other Topics Concern   • Not on file     Social History Narrative        Review of Systems   Musculoskeletal:        Muscle pain   Neurological: Positive for numbness.        Tingling   All other systems reviewed and are negative.      PHYSICAL EXAMINATION:       Ht 182.9 cm (72\")  Wt 107 kg (236 lb)  BMI 32.01 kg/m2    Physical Exam   Constitutional: He is oriented to person, place, and time. He appears well-developed and well-nourished.   Neurological: He is alert and oriented to person, place, and time.   Psychiatric: He has a normal mood and affect. His behavior is normal. Judgment and thought content normal.       GAIT:     [x]  Normal  []  Antalgic    Assistive device: [x]  None  []  Walker     []  Crutches  []  Cane     []  Wheelchair  []  Stretcher    Right Shoulder Exam     Tenderness   Right shoulder tenderness " location: Very tender over the coracoid process.    Range of Motion   Active Abduction: 170   Forward Flexion: 170   Internal Rotation 0 degrees: L1     Muscle Strength   Abduction: 4/5   Supraspinatus: 4/5     Tests   Hawkin's test: positive  Impingement: negative    Other   Erythema: absent  Sensation: normal  Pulse: present      Left Shoulder Exam     Tenderness   The patient is experiencing no tenderness.         Range of Motion   The patient has normal left shoulder ROM.    Muscle Strength   The patient has normal left shoulder strength.    Tests   Hawkin's test: negative  Impingement: negative    Other   Erythema: absent  Sensation: normal  Pulse: present                   Three view shoulder     HISTORY: Right shoulder pain.     AP films with the humerus in internal and external rotation and  tangential view were obtained.     COMPARISON: None     Minimal hypertrophic change acromioclavicular joint.  No fracture or dislocation.  No other osseous or articular abnormality.     IMPRESSION:  CONCLUSION:  Minimal hypertrophic change acromioclavicular joint.     [id number]     Electronically signed by:  Naseem Galvan MD  8/16/2017 3:59 PM CDT  Workstation: [id number]      MRI right shoulder:  Impression:  1. Mild supraspinatus tendinosis.  2. Mild infraspinatus tendinosis with evidence of a small focal full-thickness perforation.  3. Mild scapular stenosis.  4. Moderate acromioclavicular joint primary osteoarthritic  Changes are noted. There is mildly enhancing bone marrow edema in the distal clavicle which may be related to degenerative changes or bone contusion if there is a history of trauma.  5. Mild edema and synovial prominence in the rotator interval is noted which can be associated with adhesive capsulitis.  6. Abnormal degenerative appearing blunting suggestive of scarring of the anterior inferior labrum, with evidence of a tear of the posterior inferior labrum.   ASSESSMENT:    Large Joint  Arthrocentesis  Date/Time: 2/13/2018 2:51 PM  Consent given by: patient  Timeout: Immediately prior to procedure a time out was called to verify the correct patient, procedure, equipment, support staff and site/side marked as required   Supporting Documentation  Indications: pain   Procedure Details  Location: shoulder - R glenohumeral  Needle size: 22 G  Medications administered: 2 mL lidocaine 1 %; 40 mg triamcinolone acetonide 40 MG/ML  Patient tolerance: patient tolerated the procedure well with no immediate complications            Diagnoses and all orders for this visit:    Acute pain of right shoulder  -     Large Joint Arthrocentesis    Work related injury  -     Large Joint Arthrocentesis    Impingement syndrome of right shoulder  -     Large Joint Arthrocentesis    Incomplete tear of right rotator cuff  -     Large Joint Arthrocentesis    Other orders  -     coenzyme Q10 100 MG capsule; Take 100 mg by mouth Daily.          PLAN    We discussed injecting his right shoulder.  Continuing with home exercise program.  Continue with motion and activity as tolerated.  He will continue to be off work at this time as he cannot lift or do any overhead work.  Continue with strengthening and conditioning exercises.  We discussed possible MRI of his cervical spine if he continues having weakness in his arm with shooting pains or numbness and tingling.  Not at [facility].    Return in about 4 weeks (around 3/13/2018) for recheck.    David Miller MD

## 2018-02-14 ENCOUNTER — TELEPHONE (OUTPATIENT)
Dept: FAMILY MEDICINE CLINIC | Facility: CLINIC | Age: 33
End: 2018-02-14

## 2018-02-14 NOTE — TELEPHONE ENCOUNTER
----- Message from CLOVIS Arreola sent at 2/14/2018  2:02 PM CST -----  Will you call him to see how he is doing and what ortho said, I know they want him off work for another month but did they say okay to continue PT or are they doing surgery. Dr. grijalva note only says off work x 1 mtn til next appt and nothing else is added yet. And maybe do a f/u appt with me in about two weeks or so.

## 2018-02-14 NOTE — TELEPHONE ENCOUNTER
Called spoke to pt to see how he is doing how things are going for him. Patient as seen ortho yesterday they gave him a shot in his shoulder and it has been painful since since I suggested that he keep a pain diary for the Dr and when he return in a motnh he can see if the injections is helping or not. Ortho also suggested that he come in to PT only once a week.    Did tell the  that if he neededs anything to contact our office.

## 2018-02-16 RX ORDER — LIDOCAINE HYDROCHLORIDE 10 MG/ML
2 INJECTION, SOLUTION INFILTRATION; PERINEURAL
Status: COMPLETED | OUTPATIENT
Start: 2018-02-13 | End: 2018-02-13

## 2018-02-16 RX ORDER — TRIAMCINOLONE ACETONIDE 40 MG/ML
40 INJECTION, SUSPENSION INTRA-ARTICULAR; INTRAMUSCULAR
Status: COMPLETED | OUTPATIENT
Start: 2018-02-13 | End: 2018-02-13

## 2018-03-05 ENCOUNTER — OFFICE VISIT (OUTPATIENT)
Dept: FAMILY MEDICINE CLINIC | Facility: CLINIC | Age: 33
End: 2018-03-05

## 2018-03-05 VITALS
TEMPERATURE: 98 F | HEIGHT: 72 IN | BODY MASS INDEX: 31.42 KG/M2 | SYSTOLIC BLOOD PRESSURE: 122 MMHG | HEART RATE: 87 BPM | RESPIRATION RATE: 16 BRPM | WEIGHT: 232 LBS | DIASTOLIC BLOOD PRESSURE: 90 MMHG | OXYGEN SATURATION: 99 %

## 2018-03-05 VITALS
RESPIRATION RATE: 16 BRPM | DIASTOLIC BLOOD PRESSURE: 90 MMHG | WEIGHT: 232 LBS | HEART RATE: 87 BPM | TEMPERATURE: 98 F | BODY MASS INDEX: 31.42 KG/M2 | SYSTOLIC BLOOD PRESSURE: 122 MMHG | HEIGHT: 72 IN | OXYGEN SATURATION: 99 %

## 2018-03-05 DIAGNOSIS — M75.41 IMPINGEMENT SYNDROME OF RIGHT SHOULDER: ICD-10-CM

## 2018-03-05 DIAGNOSIS — M54.2 NECK PAIN ON RIGHT SIDE: ICD-10-CM

## 2018-03-05 DIAGNOSIS — F32.A DEPRESSION, UNSPECIFIED DEPRESSION TYPE: Primary | ICD-10-CM

## 2018-03-05 DIAGNOSIS — Z13.220 SCREENING FOR HYPERLIPIDEMIA: ICD-10-CM

## 2018-03-05 DIAGNOSIS — R53.83 NO ENERGY: ICD-10-CM

## 2018-03-05 DIAGNOSIS — M79.89 SWELLING OF RIGHT HAND: ICD-10-CM

## 2018-03-05 DIAGNOSIS — Z13.29 SCREENING FOR THYROID DISORDER: ICD-10-CM

## 2018-03-05 DIAGNOSIS — Z02.6 ENCOUNTER RELATED TO WORKER'S COMPENSATION CLAIM: Primary | ICD-10-CM

## 2018-03-05 DIAGNOSIS — R20.2 NUMBNESS AND TINGLING OF RIGHT ARM: ICD-10-CM

## 2018-03-05 DIAGNOSIS — Z13.1 SCREENING FOR DIABETES MELLITUS: ICD-10-CM

## 2018-03-05 DIAGNOSIS — R20.0 NUMBNESS AND TINGLING OF RIGHT ARM: ICD-10-CM

## 2018-03-05 DIAGNOSIS — R45.1 AGITATION: ICD-10-CM

## 2018-03-05 DIAGNOSIS — M25.511 ACUTE PAIN OF RIGHT SHOULDER: ICD-10-CM

## 2018-03-05 DIAGNOSIS — R53.83 OTHER FATIGUE: ICD-10-CM

## 2018-03-05 DIAGNOSIS — M75.111 INCOMPLETE TEAR OF RIGHT ROTATOR CUFF: ICD-10-CM

## 2018-03-05 PROCEDURE — 99214 OFFICE O/P EST MOD 30 MIN: CPT | Performed by: NURSE PRACTITIONER

## 2018-03-05 PROCEDURE — 99213 OFFICE O/P EST LOW 20 MIN: CPT | Performed by: NURSE PRACTITIONER

## 2018-03-05 RX ORDER — MELOXICAM 15 MG/1
15 TABLET ORAL DAILY
Qty: 30 TABLET | Refills: 5 | Status: SHIPPED | OUTPATIENT
Start: 2018-03-05 | End: 2018-10-29 | Stop reason: SDUPTHER

## 2018-03-05 RX ORDER — BUPROPION HYDROCHLORIDE 150 MG/1
150 TABLET ORAL DAILY
Qty: 30 TABLET | Refills: 0 | Status: SHIPPED | OUTPATIENT
Start: 2018-03-05 | End: 2018-04-05 | Stop reason: SDUPTHER

## 2018-03-05 RX ORDER — BACLOFEN 10 MG/1
10 TABLET ORAL 2 TIMES DAILY PRN
Qty: 90 TABLET | Refills: 1 | Status: SHIPPED | OUTPATIENT
Start: 2018-03-05 | End: 2018-05-03 | Stop reason: SDUPTHER

## 2018-03-05 RX ORDER — BUSPIRONE HYDROCHLORIDE 5 MG/1
5 TABLET ORAL 3 TIMES DAILY
Qty: 90 TABLET | Refills: 0 | Status: SHIPPED | OUTPATIENT
Start: 2018-03-05 | End: 2018-04-05 | Stop reason: SDUPTHER

## 2018-03-07 ENCOUNTER — LAB (OUTPATIENT)
Dept: LAB | Facility: OTHER | Age: 33
End: 2018-03-07

## 2018-03-07 DIAGNOSIS — R53.83 OTHER FATIGUE: ICD-10-CM

## 2018-03-07 DIAGNOSIS — Z13.29 SCREENING FOR THYROID DISORDER: ICD-10-CM

## 2018-03-07 DIAGNOSIS — Z13.1 SCREENING FOR DIABETES MELLITUS: ICD-10-CM

## 2018-03-07 DIAGNOSIS — R53.83 NO ENERGY: ICD-10-CM

## 2018-03-07 DIAGNOSIS — Z13.220 SCREENING FOR HYPERLIPIDEMIA: ICD-10-CM

## 2018-03-07 LAB
25(OH)D3 SERPL-MCNC: 24.8 NG/ML (ref 30–100)
ALBUMIN SERPL-MCNC: 4.3 G/DL (ref 3.2–5.5)
ALBUMIN/GLOB SERPL: 1.4 G/DL (ref 1–3)
ALP SERPL-CCNC: 45 U/L (ref 15–121)
ALT SERPL W P-5'-P-CCNC: 62 U/L (ref 10–60)
ANION GAP SERPL CALCULATED.3IONS-SCNC: 9 MMOL/L (ref 5–15)
AST SERPL-CCNC: 31 U/L (ref 10–60)
BASOPHILS # BLD AUTO: 0.03 10*3/MM3 (ref 0–0.2)
BASOPHILS NFR BLD AUTO: 0.3 % (ref 0–2)
BILIRUB SERPL-MCNC: 1.2 MG/DL (ref 0.2–1)
BUN BLD-MCNC: 15 MG/DL (ref 8–25)
BUN/CREAT SERPL: 13.6 (ref 7–25)
CALCIUM SPEC-SCNC: 9.3 MG/DL (ref 8.4–10.8)
CHLORIDE SERPL-SCNC: 103 MMOL/L (ref 100–112)
CHOLEST SERPL-MCNC: 200 MG/DL (ref 150–200)
CO2 SERPL-SCNC: 27 MMOL/L (ref 20–32)
CREAT BLD-MCNC: 1.1 MG/DL (ref 0.4–1.3)
DEPRECATED RDW RBC AUTO: 45.2 FL (ref 35.1–43.9)
EOSINOPHIL # BLD AUTO: 0.4 10*3/MM3 (ref 0–0.7)
EOSINOPHIL NFR BLD AUTO: 3.9 % (ref 0–7)
ERYTHROCYTE [DISTWIDTH] IN BLOOD BY AUTOMATED COUNT: 13.8 % (ref 11.5–14.5)
GFR SERPL CREATININE-BSD FRML MDRD: 77 ML/MIN/1.73 (ref 70–162)
GLOBULIN UR ELPH-MCNC: 3.1 GM/DL (ref 2.5–4.6)
GLUCOSE BLD-MCNC: 97 MG/DL (ref 70–100)
HCT VFR BLD AUTO: 47.3 % (ref 39–49)
HDLC SERPL-MCNC: 30 MG/DL (ref 35–100)
HGB BLD-MCNC: 16.1 G/DL (ref 13.7–17.3)
LDLC SERPL CALC-MCNC: 130 MG/DL
LDLC/HDLC SERPL: 4.33 {RATIO}
LYMPHOCYTES # BLD AUTO: 3.7 10*3/MM3 (ref 0.6–4.2)
LYMPHOCYTES NFR BLD AUTO: 35.7 % (ref 10–50)
MCH RBC QN AUTO: 31 PG (ref 26.5–34)
MCHC RBC AUTO-ENTMCNC: 34 G/DL (ref 31.5–36.3)
MCV RBC AUTO: 91 FL (ref 80–98)
MONOCYTES # BLD AUTO: 0.7 10*3/MM3 (ref 0–0.9)
MONOCYTES NFR BLD AUTO: 6.8 % (ref 0–12)
NEUTROPHILS # BLD AUTO: 5.52 10*3/MM3 (ref 2–8.6)
NEUTROPHILS NFR BLD AUTO: 53.3 % (ref 37–80)
PLATELET # BLD AUTO: 334 10*3/MM3 (ref 150–450)
PMV BLD AUTO: 9.8 FL (ref 8–12)
POTASSIUM BLD-SCNC: 4.6 MMOL/L (ref 3.4–5.4)
PROT SERPL-MCNC: 7.4 G/DL (ref 6.7–8.2)
RBC # BLD AUTO: 5.2 10*6/MM3 (ref 4.37–5.74)
SODIUM BLD-SCNC: 139 MMOL/L (ref 134–146)
T4 FREE SERPL-MCNC: 1.3 NG/DL (ref 0.78–2.19)
TRIGL SERPL-MCNC: 201 MG/DL (ref 35–160)
TSH SERPL DL<=0.05 MIU/L-ACNC: 0.96 MIU/ML (ref 0.46–4.68)
VIT B12 BLD-MCNC: 822 PG/ML (ref 239–931)
VLDLC SERPL-MCNC: 40.2 MG/DL
WBC NRBC COR # BLD: 10.35 10*3/MM3 (ref 3.2–9.8)

## 2018-03-07 PROCEDURE — 84403 ASSAY OF TOTAL TESTOSTERONE: CPT | Performed by: NURSE PRACTITIONER

## 2018-03-07 PROCEDURE — 80061 LIPID PANEL: CPT | Performed by: NURSE PRACTITIONER

## 2018-03-07 PROCEDURE — 82607 VITAMIN B-12: CPT | Performed by: NURSE PRACTITIONER

## 2018-03-07 PROCEDURE — 82306 VITAMIN D 25 HYDROXY: CPT | Performed by: NURSE PRACTITIONER

## 2018-03-07 PROCEDURE — 84402 ASSAY OF FREE TESTOSTERONE: CPT | Performed by: NURSE PRACTITIONER

## 2018-03-07 PROCEDURE — 80050 GENERAL HEALTH PANEL: CPT | Performed by: NURSE PRACTITIONER

## 2018-03-07 PROCEDURE — 84439 ASSAY OF FREE THYROXINE: CPT | Performed by: NURSE PRACTITIONER

## 2018-03-09 PROBLEM — M75.41 IMPINGEMENT SYNDROME OF RIGHT SHOULDER: Status: ACTIVE | Noted: 2018-03-09

## 2018-03-09 PROBLEM — M75.111 INCOMPLETE TEAR OF RIGHT ROTATOR CUFF: Status: ACTIVE | Noted: 2018-03-09

## 2018-03-09 LAB
TESTOST FREE SERPL-MCNC: 14.1 PG/ML (ref 8.7–25.1)
TESTOST SERPL-MCNC: 365 NG/DL (ref 264–916)

## 2018-03-10 NOTE — PROGRESS NOTES
Subjective   Marko Jaquez is a 32 y.o. male who presents to the office complaining of depression.  Accompanied by wife.    History of Present Illness     Depression: Developed and progressively gotten worse over the past 2 months.  Patient states anger has gotten a lot worse to the point where he has gotten overly upset with his children and wife, wife states that that is not normal for him.  Patient states he wants to just lay around and sleep, has no motivation and no energy.  Patient has been off work for about half a year related to work injury, and patient has worked all his life since he was really young and could be extreme contributing factor to this.  Patient states that he has had no issues with fatigue in the past, however he has had issues with being short tempered but now he is extremely short tempered and irritated very easily.  Discussed options for treating depression and anger, concerned about starting patient on SSRI or SNRI due to the fact that symptoms may get worse over the first 2 weeks of initiating this medication, wife is concerned about starting patient on Wellbutrin because uncle has had issues with it when he was on it.  Patient would also like to stop smoking, has tried gum in the past but it does not work because it makes him nauseous.  Discussed with patient that Wellbutrin and Chantix help with smoking cessation, patient is agreeable to start on Wellbutrin as well as BuSpar.  Patient also agreeable to doing lab work for possible etiologies of fatigue as well.  P't denies wanting to harm himself or others, but has had the urge to fight his neighbors due to conflict issues with them and his short temper.     The following portions of the patient's history were reviewed and updated as appropriate: allergies, current medications, past family history, past medical history, past social history, past surgical history and problem list.    Review of Systems   Constitutional: Positive for  "fatigue.   Musculoskeletal: Positive for arthralgias (bilateral knee, right shoulder) and neck pain (right sided).   Neurological: Positive for numbness. Negative for weakness.   Psychiatric/Behavioral: Positive for agitation and sleep disturbance. Negative for self-injury and suicidal ideas. The patient is nervous/anxious.        Past Medical History:   Diagnosis Date   • Acute bronchitis    • Acute pharyngitis    • Backache    • Chronic low back pain    • Dysfunction of eustachian tube    • Exanthematous disorder    • Generalized abdominal pain    • Headache    • Health examination of defined sub-group    • Heat exhaustion    • Influenza- like illness    • Intervertebral disc prolapse    • Low back pain     with radiculopathy   • Malaise and fatigue    • Pain in elbow    • Pneumonia    • Sciatica    • Skin sensation disturbance        History reviewed. No pertinent family history.       Objective   /90  Pulse 87  Temp 98 °F (36.7 °C) (Temporal Artery )   Resp 16  Ht 182.9 cm (72\")  Wt 105 kg (232 lb)  SpO2 99%  BMI 31.46 kg/m2  Physical Exam   Constitutional: He is oriented to person, place, and time. He appears well-developed and well-nourished. No distress.   Cardiovascular: Normal rate, regular rhythm, normal heart sounds and intact distal pulses.  Exam reveals no gallop and no friction rub.    No murmur heard.  Pulmonary/Chest: Effort normal and breath sounds normal. No respiratory distress. He has no wheezes. He has no rales.   Musculoskeletal:        Right shoulder: He exhibits decreased range of motion, tenderness, bony tenderness, pain and decreased strength. He exhibits no swelling, no effusion, no crepitus, no deformity, no laceration, no spasm and normal pulse.        Cervical back: He exhibits tenderness (right side) and pain (right side). He exhibits normal range of motion, no bony tenderness, no swelling, no edema, no deformity, no laceration, no spasm and normal pulse.        " Arms:  Neurological: He is alert and oriented to person, place, and time. He is not disoriented.   Psychiatric: His speech is normal and behavior is normal. Thought content normal. His mood appears not anxious. His affect is not angry. He is not actively hallucinating. Cognition and memory are normal. He exhibits a depressed mood.   Patient is dressed appropriately for weather and situation, makes eye contact, and engages in conversation.  He is attentive.   Nursing note and vitals reviewed.       PHQ-2/PHQ-9 Depression Screening 3/5/2018   Little interest or pleasure in doing things 3   Feeling down, depressed, or hopeless 3   Trouble falling or staying asleep, or sleeping too much 3   Feeling tired or having little energy 3   Poor appetite or overeating 3   Feeling bad about yourself - or that you are a failure or have let yourself or your family down 3   Trouble concentrating on things, such as reading the newspaper or watching television 0   Moving or speaking so slowly that other people could have noticed. Or the opposite - being so fidgety or restless that you have been moving around a lot more than usual 3   Thoughts that you would be better off dead, or of hurting yourself in some way 0   Total Score 21   If you checked off any problems, how difficult have these problems made it for you to do your work, take care of things at home, or get along with other people? Very difficult         Assessment/Plan   Marko was seen today for anxiety.    Diagnoses and all orders for this visit:    Depression, unspecified depression type  -     buPROPion XL (WELLBUTRIN XL) 150 MG 24 hr tablet; Take 1 tablet by mouth Daily.  -     busPIRone (BUSPAR) 5 MG tablet; Take 1 tablet by mouth 3 (Three) Times a Day.    Other fatigue  -     T4, Free; Future  -     Testosterone, Free, Total; Future  -     TSH; Future  -     Vitamin D 25 Hydroxy; Future  -     Comprehensive Metabolic Panel; Future  -     CBC & Differential; Future  -      Vitamin B12; Future    Screening for hyperlipidemia  -     Lipid Panel; Future    Screening for diabetes mellitus  -     Comprehensive Metabolic Panel; Future    Screening for thyroid disorder  -     T4, Free; Future  -     TSH; Future    No energy  -     T4, Free; Future  -     Testosterone, Free, Total; Future  -     TSH; Future  -     Vitamin D 25 Hydroxy; Future  -     Comprehensive Metabolic Panel; Future  -     CBC & Differential; Future  -     Vitamin B12; Future    Agitation           Depression/agitation-acute problem, unstable: Started patient on Wellbutrin and BuSpar, educated patient to follow-up in 2 weeks or sooner if conditions worsen.    Fatigue/no energy-acute problem, unstable: Performed fatigue lab workup, will call patient with results.    Screening labs for thyroid, diabetes, and hyperlipidemia related to breast factors and symptoms of depression and fatigue.  Follow-up as needed after labs will call patient with lab results.    Patient educated to follow-up sooner than next scheduled appointment if condition(s) worse or do not improve. Patient states understanding and is in agreeance with plan of care. An After Visit Summary was printed and given to the patient.      CLOVIS Arreola        This document has been electronically signed by CLOVIS Arreola on March 9, 2018 7:39 PM      EMR/Transcription Dragon Disclaimer:  Some of this note may be an electronic dragon transcription/translation of spoken language to printed text. The electronic translation of spoken language may permit erroneous, or at times, nonsensical words or phrases to be inadvertently transcribed. Although I have reviewed the note for such errors, some may still exist.

## 2018-03-10 NOTE — PROGRESS NOTES
Subjective   Marko Jaquez is a 32 y.o. male who presents to the office for F/U of worker's comp.     History of Present Illness     This is a worker's comp visit.      11/21/17 Initial Visit for worker's comp: Patient works for [employer] in [facility].  At work on November 15, 2017, patient was building a [place] and went to lift a 20-30 pound concrete block and felt his right shoulder pop.  Patient states that he notified his claim officer Pat Waldrop (who is now Zuleyma Pascual).  Patient has had this happen in the past but he got better, he was completely fine and he did follow this through Workmen's Comp for that past incident.  Patient states that this time it is worse, patient states that over the past week since this happened he has had pain in his shoulder posterior and anteriorly and over the deltoid muscle, with pain radiating up to the right side of his neck and onto the back of his shoulder blade.  Pain is normally very minimal if patient is not moving his right arm, however when patient moves a pain jumps up to it 8 or 9 out of 10, patient states sharp pain shooting into the neck or back or shoulder blade.  Patient states that when this happened he went to the [facility] ER, they did x-rays of his right shoulder which were negative, they told him to go four days without lifting anything and then to follow-up with his PCP.  Patient's primary PCP Lacey Mnuson, however patient was not able to get in with her and she does not do Worker's Comp.  Patient states that November 20th, his right hand has started to swell up and go numb, patient states that this is not related to movement, he can just happen when he is sitting completely still, develops over time and then goes away but has fluctuated back and forth and came back since yesterday and today off and on, patient states that the swelling and numbness is mainly in the thumb ring finger and middle finger of the right hand.  Patient states  "that he normally takes 7.5 mg daily of mobic for bilateral knee pain because he has a history of arthritis in his knees.  He had been taking ibuprofen when necessary and applying Aspercreme OTC to the right shoulder which does help some with the pain.  Patient states that with his job if he is put on any work restrictions he will not be able to work. At initial appt, we prescribed lidocaine, increased Mobic from 7.5 to 15 mg daily, stop taking ibuprofen, prescribed baclofen prn. Ordered MRI. Wrote note for patient to have restrictions at work to not lift over five pounds with right arm for acute right shoulder pain and numbness of right arm.       MRI done on 12/21/17 at [facility] of right shoulder with and without contrast.   Impression:  1. Mild supraspinatus tendinosis.  2. Mild infraspinatus tendinosis with evidence of a small focal full-thickness perforation.  3. Mild scapular stenosis.  4. Moderate acromioclavicular joint primary osteoarthritic  Changes are noted. There is mildly enhancing bone marrow edema in the distal clavicle which may be related to degenerative changes or bone contusion if there is a history of trauma.  5. Mild edema and synovial prominence in the rotator interval is noted which can be associated with adhesive capsulitis.  6. Abnormal degenerative appearing blunting suggestive of scarring of the anterior inferior labrum, with evidence of a tear of the posterior inferior labrum.     Today, patient states pain is about the same with good and bad days. Baclofen helps TID prn, along with lidocaine prn and mobic 15 mg daily. Patient states pain is the least bothersome in the ams but increase throughout the day as he moves his arm more. Patient states he has not been lifting anything over five pounds with his right arm unless he is using his left arm to help. Patient states when he holds right forearm against his chest, the right shoulder \"pops and it relieves some pressure\" and " "that he also hears like a grinding sound. The swelling of right hand has decreased with minimal numbness of right hand but the incidence of numbness and swelling increases with use of right shoulder and arm. Patient states he can hold anything in his right arm but when he goes to lift or push or if he moves his shoulder, it causing a significant amount of sharp pain in his right shoulder. Right sided neck pain still present but mild and still thinking it is coming from his right shoulder as radiating pain. At last appt, 1/5/19, pt was referred to ortho and PT. Still waiting on worker's comp to approve ortho referral. PT consult done on 1/9/19 and recommended patient has PT 2x/week x 4weeks, patient had been doing PT ×8 appointments up until his appointment with Dr. Miller and then missed an appointment shortly after that and needs to follow up to reschedule.  Patient states that right shoulder pain does radiate into the right side of his neck still and he has a lot of muscular soreness around the right side of his neck, agreeable to doing x-ray of cervical spine now so that if Dr. Miller decides to do MRI of cervical spine, x-ray has already been completed.      Office visit 2/13/18 with Dr. Miller orthopedic specialist: Diagnosis of acute right shoulder pain, work-related injury, impingement of right shoulder, incomplete tear of right rotator cuff.  Patient given injection into the right glenohumeral of left shoulder with 2 mm lidocaine 1% and Kenalog 40 mg/mL, 1 mL during office visit.  Patient prescribed CoQ10 enzyme 100 mg daily.  Plan of care \"We discussed injecting his right shoulder.  Continuing with home exercise program.  Continue with motion and activity as tolerated. He will continue to be off work at this time as he cannot lift or do any overhead work. Continue with strengthening and conditioning exercises. We discussed possible MRI of his cervical spine if he continues having weakness in his arm with " "shooting pains or numbness and tingling. Not at [facility].\"  Follow-up appointment scheduled for 3/3/18.    This is a worker's comp visit.     The following portions of the patient's history were reviewed and updated as appropriate: allergies, current medications, past family history, past medical history, past social history, past surgical history and problem list.    Review of Systems   Musculoskeletal: Positive for arthralgias (bilateral knee, right shoulder) and neck pain (right sided).   Neurological: Positive for numbness. Negative for weakness.       Past Medical History:   Diagnosis Date   • Acute bronchitis    • Acute pharyngitis    • Backache    • Chronic low back pain    • Dysfunction of eustachian tube    • Exanthematous disorder    • Generalized abdominal pain    • Headache    • Health examination of defined sub-group    • Heat exhaustion    • Influenza- like illness    • Intervertebral disc prolapse    • Low back pain     with radiculopathy   • Malaise and fatigue    • Pain in elbow    • Pneumonia    • Sciatica    • Skin sensation disturbance        History reviewed. No pertinent family history.       Objective   /90  Pulse 87  Temp 98 °F (36.7 °C) (Temporal Artery )   Resp 16  Ht 182.9 cm (72\")  Wt 105 kg (232 lb)  SpO2 99%  BMI 31.46 kg/m2  Physical Exam   Constitutional: He appears well-developed and well-nourished. No distress.   Cardiovascular: Normal rate, regular rhythm, normal heart sounds and intact distal pulses.  Exam reveals no gallop and no friction rub.    No murmur heard.  Pulmonary/Chest: Effort normal and breath sounds normal. No respiratory distress. He has no wheezes. He has no rales.   Musculoskeletal:        Right shoulder: He exhibits decreased range of motion, tenderness, bony tenderness, pain and decreased strength. He exhibits no swelling, no effusion, no crepitus, no deformity, no laceration, no spasm and normal pulse.        Cervical back: He exhibits tenderness (right " side) and pain (right side). He exhibits normal range of motion, no bony tenderness, no swelling, no edema, no deformity, no laceration, no spasm and normal pulse.        Arms:  Psychiatric: He has a normal mood and affect. His behavior is normal.   Nursing note and vitals reviewed.       PHQ-2/PHQ-9 Depression Screening 3/5/2018   Little interest or pleasure in doing things 3   Feeling down, depressed, or hopeless 3   Trouble falling or staying asleep, or sleeping too much 3   Feeling tired or having little energy 3   Poor appetite or overeating 3   Feeling bad about yourself - or that you are a failure or have let yourself or your family down 3   Trouble concentrating on things, such as reading the newspaper or watching television 0   Moving or speaking so slowly that other people could have noticed. Or the opposite - being so fidgety or restless that you have been moving around a lot more than usual 3   Thoughts that you would be better off dead, or of hurting yourself in some way 0   Total Score 21   If you checked off any problems, how difficult have these problems made it for you to do your work, take care of things at home, or get along with other people? Very difficult         Assessment/Plan   Marko was seen today for follow-up.    Diagnoses and all orders for this visit:    Encounter related to worker's compensation claim    Neck pain on right side  -     XR Spine Cervical 2 or 3 View  -     meloxicam (MOBIC) 15 MG tablet; Take 1 tablet by mouth Daily.  -     baclofen (LIORESAL) 10 MG tablet; Take 1 tablet by mouth 2 (Two) Times a Day As Needed for Muscle Spasms.    Acute pain of right shoulder  -     meloxicam (MOBIC) 15 MG tablet; Take 1 tablet by mouth Daily.  -     baclofen (LIORESAL) 10 MG tablet; Take 1 tablet by mouth 2 (Two) Times a Day As Needed for Muscle Spasms.    Swelling of right hand    Numbness and tingling of right arm    Incomplete tear of right rotator cuff    Impingement syndrome of  right shoulder    This is a worker's comp visit.     Pt here for worker's comp accident with acute pain of right shoulder, numbness and tingling of right arm, right sided neck pain, and swelling of right hand.  Diagnoses from Dr. Miller orthopedic specialist of incomplete tear of right rotator cuff and impingement syndrome of right shoulder causing acute pain of right shoulder:  Patient currently on baclofen 10mg TID prn, lidocaine prn, and mobic 15 mg daily.. Pt will continue restrictions of not lifting anything over five pounds, will continue pt as he states that Dr. Miller told him to, patient will continue to be off work, and will F/U after appt with ortho.     Patient educated to follow-up sooner than next scheduled appointment if condition(s) worse or do not improve. Patient states understanding and is in agreeance with plan of care. An After Visit Summary was printed and given to the patient.    This is a worker's comp visit.     Current Outpatient Prescriptions:   •  coenzyme Q10 100 MG capsule, Take 100 mg by mouth Daily., Disp: , Rfl:   •  Esomeprazole Magnesium (NEXIUM 24HR PO), Take  by mouth., Disp: , Rfl:   •  lidocaine (XYLOCAINE) 5 % ointment, Apply  topically At Night As Needed for Mild Pain  or Moderate Pain ., Disp: 50 g, Rfl: 0  •  meloxicam (MOBIC) 15 MG tablet, Take 1 tablet by mouth Daily., Disp: 30 tablet, Rfl: 5  •  baclofen (LIORESAL) 10 MG tablet, Take 1 tablet by mouth 2 (Two) Times a Day As Needed for Muscle Spasms., Disp: 90 tablet, Rfl: 1  •  buPROPion XL (WELLBUTRIN XL) 150 MG 24 hr tablet, Take 1 tablet by mouth Daily., Disp: 30 tablet, Rfl: 0  •  busPIRone (BUSPAR) 5 MG tablet, Take 1 tablet by mouth 3 (Three) Times a Day., Disp: 90 tablet, Rfl: 0      CLOVIS Arreola        This document has been electronically signed by CLOVIS Arreola on March 9, 2018 7:50 PM      EMR/Transcription Dragon Disclaimer:  Some of this note may be an electronic dragon  transcription/translation of spoken language to printed text. The electronic translation of spoken language may permit erroneous, or at times, nonsensical words or phrases to be inadvertently transcribed. Although I have reviewed the note for such errors, some may still exist.

## 2018-03-12 ENCOUNTER — TELEPHONE (OUTPATIENT)
Dept: FAMILY MEDICINE CLINIC | Facility: CLINIC | Age: 33
End: 2018-03-12

## 2018-03-12 DIAGNOSIS — E55.9 VITAMIN D DEFICIENCY: Primary | ICD-10-CM

## 2018-03-12 NOTE — TELEPHONE ENCOUNTER
----- Message from CLOVIS Arreola sent at 3/12/2018 12:50 PM CDT -----  CMP looks at your kidney function, liver function, electrolytes, and a preliminary test for diabetes, all of these labs were normal except your blood glucose level is 97 when she get over 100 every prediabetes cell we need to keep an eye on that every 6 months.  Liver enzyme is slightly elevated, not anything too stressed about but needs to make sure that the patient is not taking any NSAIDs with the Mobic like I wrote down when he came in to his last visit and we need to recheck this in about a month.  Lipid panel is a little out of the norm.  Total cholesterol at 200 only preferred to be below 200s are not too bad, triglycerides within normal limits, HDL which is your good cholesterol that is at 30, we preferred that it was above 40.  LDL allergies are bad cholesterol is 1:30 preferred that it would be below 100.  For the next 6 months, I would like the patient to decrease saturated fats (such as red meat, butter, milk, cheese, and poultry fat), exercise if possible, stop smoking  or decrease smoking if applicable, stop drinking or decrease drinking if applicable, increase unsaturated fats such as fish particularly salmon.  Will recheck in 6 months.  Vitamin B12 is within normal limits.  Thyroid panel within normal limits.  Vitamin D is significantly low but not well enough to go on the weekly supplements, so patient will go on a daily supplement which I will call into the pharmacy.  Testosterone is 365, normal testosterone is 264-916, if you are under 400 and symptomatic with fatigue, low energy, low libido or any of those symptoms, it is suggested that you treat with testosterone injections.  This would be a monthly injection, however insurance usually will not cover it to be given at home unless we do another testosterone level and it is below 264.  However they will cover you to come to the injection clinic here and get  testosterone injections once a month if you would like to do that please let me now.  Give me an idea of when the patient will start the  injections.  An after the second injection right before getting the third injection, we will need to check his testosterone levels again to see if a need to increase the frequency of getting injections.    So to recap, diet changes and exercise as tolerated for cholesterol, only take pelvic and no other NSAIDs, take vitamin D supplement twice a day, and let me know if he needs testosterone injections ordered and if he will come to get them done here.

## 2018-03-12 NOTE — TELEPHONE ENCOUNTER
----- Message from CLOVIS Arreola sent at 3/5/2018  4:46 PM CST -----  No problems with vertebras but pt does have straightening of the cervical lordosis of spine which should be more curved so possible pinching of nerve causing shoulder pain but orthopedic specialist may be able to give more insight into that, those results could be off if muscle spasms or improper positioning during xray

## 2018-03-13 ENCOUNTER — OFFICE VISIT (OUTPATIENT)
Dept: ORTHOPEDIC SURGERY | Facility: CLINIC | Age: 33
End: 2018-03-13

## 2018-03-13 VITALS — WEIGHT: 232 LBS | HEIGHT: 72 IN | BODY MASS INDEX: 31.42 KG/M2

## 2018-03-13 DIAGNOSIS — M75.111 INCOMPLETE TEAR OF RIGHT ROTATOR CUFF: ICD-10-CM

## 2018-03-13 DIAGNOSIS — M25.511 ACUTE PAIN OF RIGHT SHOULDER: Primary | ICD-10-CM

## 2018-03-13 DIAGNOSIS — M54.12 CERVICAL RADICULOPATHY: ICD-10-CM
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
## 2018-03-13 DIAGNOSIS — M75.41 IMPINGEMENT SYNDROME OF RIGHT SHOULDER: ICD-10-CM

## 2018-03-13 DIAGNOSIS — M54.2 NECK PAIN: ICD-10-CM

## 2018-03-13 PROCEDURE — G8417 CALC BMI ABV UP PARAM F/U: HCPCS | Performed by: ORTHOPAEDIC SURGERY

## 2018-03-13 PROCEDURE — 99213 OFFICE O/P EST LOW 20 MIN: CPT | Performed by: ORTHOPAEDIC SURGERY

## 2018-03-13 NOTE — PROGRESS NOTES
Marko Jaquez is a 32 y.o. male returns for     Chief Complaint   Patient presents with   • Right Shoulder - Follow-up       HISTORY OF PRESENT ILLNESS:   steroid injection right shoulder done on 2/13/2018 did not help.  Patient did not go to therapy was  Not approved by work comp. Comp nurse with the patient said that it is now approved if he still needs to have it.   He is not working.  He states that he is not having any numbness and tingling currently but he does if he tries to do anything physical.  He states that he has weakness in his arm if he tries to be active at all.  He is having pain at night that involves his shoulder and it goes down his arm.     CONCURRENT MEDICAL HISTORY:    Past Medical History:   Diagnosis Date   • Acute bronchitis    • Acute pharyngitis    • Backache    • Chronic low back pain    • Dysfunction of eustachian tube    • Exanthematous disorder    • Generalized abdominal pain    • Headache    • Health examination of defined sub-group    • Heat exhaustion    • Influenza- like illness    • Intervertebral disc prolapse    • Low back pain     with radiculopathy   • Malaise and fatigue    • Pain in elbow    • Pneumonia    • Sciatica    • Skin sensation disturbance        Allergies   Allergen Reactions   • Lortab [Hydrocodone-Acetaminophen]          Current Outpatient Prescriptions:   •  baclofen (LIORESAL) 10 MG tablet, Take 1 tablet by mouth 2 (Two) Times a Day As Needed for Muscle Spasms., Disp: 90 tablet, Rfl: 1  •  buPROPion XL (WELLBUTRIN XL) 150 MG 24 hr tablet, Take 1 tablet by mouth Daily., Disp: 30 tablet, Rfl: 0  •  busPIRone (BUSPAR) 5 MG tablet, Take 1 tablet by mouth 3 (Three) Times a Day., Disp: 90 tablet, Rfl: 0  •  Calcium Carb-Cholecalciferol (CALCIUM-VITAMIN D) 500-200 MG-UNIT per tablet, Take 1 tablet by mouth 2 (Two) Times a Day With Meals. For Vit D/Calcium supplementation, Disp: 60 tablet, Rfl: 11  •  coenzyme Q10 100 MG capsule, Take 100 mg by mouth Daily., Disp: ,  "Rfl:   •  Esomeprazole Magnesium (NEXIUM 24HR PO), Take  by mouth., Disp: , Rfl:   •  lidocaine (XYLOCAINE) 5 % ointment, Apply  topically At Night As Needed for Mild Pain  or Moderate Pain ., Disp: 50 g, Rfl: 0  •  meloxicam (MOBIC) 15 MG tablet, Take 1 tablet by mouth Daily., Disp: 30 tablet, Rfl: 5    Past Surgical History:   Procedure Laterality Date   • INJECTION OF MEDICATION      Depo Medrol (80mg)   • INJECTION OF MEDICATION      Dexamethasone (5mg)   • INJECTION OF MEDICATION      Kenalog (40mg)   • INJECTION OF MEDICATION      Rocephin (1 gm)   • INJECTION OF MEDICATION      Toradol (60mg)       ROS  No fevers or chills.  No chest pain or shortness of air.  No GI or  disturbances.    PHYSICAL EXAMINATION:       Ht 182.9 cm (72\")   Wt 105 kg (232 lb)   BMI 31.46 kg/m²     Physical Exam   Constitutional: He is oriented to person, place, and time. He appears well-developed and well-nourished.   Neurological: He is alert and oriented to person, place, and time.   Psychiatric: He has a normal mood and affect. His behavior is normal. Judgment and thought content normal.       GAIT:     [x]  Normal  []  Antalgic    Assistive device: [x]  None  []  Walker     []  Crutches  []  Cane     []  Wheelchair  []  Stretcher    Back Exam     Comments:  Neck exam is slightly limited with motion.  He has a positive Spurling test leaning to the left and a positive quadrant test turning to the right.  Both of these reproduce symptoms that go down into his right shoulder and into his arm.      Right Shoulder Exam     Tenderness   Right shoulder tenderness location: Very tender over the coracoid process.    Range of Motion   Active Abduction: 160   Forward Flexion: 150   Internal Rotation 0 degrees: L1     Muscle Strength   Abduction: 4/5   Supraspinatus: 4/5     Tests   Hawkin's test: positive  Impingement: negative    Other   Erythema: absent  Sensation: normal  Pulse: present      Left Shoulder Exam     Tenderness   The " patient is experiencing no tenderness.         Range of Motion   The patient has normal left shoulder ROM.    Muscle Strength   The patient has normal left shoulder strength.    Tests   Hawkin's test: negative  Impingement: negative    Other   Erythema: absent  Sensation: normal  Pulse: present               Discussion with  was held with the patient in the room.  We discussed work activity, progression, further treatment options, and return to work.  All questions were answered.          ASSESSMENT:    Diagnoses and all orders for this visit:    Acute pain of right shoulder  -     Ambulatory Referral to Physical Therapy Evaluate and treat (rom/str shoulder. cervical traction, HEP. progress as tolerated. )  -     MRI Cervical Spine Without Contrast; Future    Work related injury  -     Ambulatory Referral to Physical Therapy Evaluate and treat (rom/str shoulder. cervical traction, HEP. progress as tolerated. )  -     MRI Cervical Spine Without Contrast; Future    Impingement syndrome of right shoulder  -     Ambulatory Referral to Physical Therapy Evaluate and treat (rom/str shoulder. cervical traction, HEP. progress as tolerated. )  -     MRI Cervical Spine Without Contrast; Future    Incomplete tear of right rotator cuff  -     Ambulatory Referral to Physical Therapy Evaluate and treat (rom/str shoulder. cervical traction, HEP. progress as tolerated. )  -     MRI Cervical Spine Without Contrast; Future    Neck pain  -     Ambulatory Referral to Physical Therapy Evaluate and treat (rom/str shoulder. cervical traction, HEP. progress as tolerated. )  -     MRI Cervical Spine Without Contrast; Future    Cervical radiculopathy  -     Ambulatory Referral to Physical Therapy Evaluate and treat (rom/str shoulder. cervical traction, HEP. progress as tolerated. )  -     MRI Cervical Spine Without Contrast; Future          PLAN  Discussed the patient's BMI with him. BMI is above normal parameters. Follow-up plan  includes:  exercise counseling and nutrition counseling.    Patient has been unable to go to physical therapy at this time because it was not approved by Worker's Compensation until just recently.  He will begin physical therapy with range of motion and strengthening of the shoulder but also with traction of the cervical spine and cervical spine exercises.      We also discussed proceeding with an MRI of his cervical spine to assess for nerve root impingement or foraminal stenosis.    I recommended that he continue with meloxicam.    Follow-up after the MRI to assess for the results of the MRI as well as changes with physical therapy.  We discussed approximately 4-5 weeks to allow for changes with physical therapy.  And less, of course, the MRI was found to have significant findings at which time we would arrange for other follow-up.    Continue off work.    Return for recheck for MRI results.    David Miller MD

## 2018-04-02 DIAGNOSIS — M75.41 IMPINGEMENT SYNDROME OF RIGHT SHOULDER: ICD-10-CM
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
## 2018-04-02 DIAGNOSIS — M25.511 ACUTE PAIN OF RIGHT SHOULDER: ICD-10-CM

## 2018-04-02 DIAGNOSIS — M54.12 CERVICAL RADICULOPATHY: ICD-10-CM

## 2018-04-02 DIAGNOSIS — M75.111 INCOMPLETE TEAR OF RIGHT ROTATOR CUFF: ICD-10-CM

## 2018-04-02 DIAGNOSIS — M54.2 NECK PAIN: ICD-10-CM

## 2018-04-05 ENCOUNTER — OFFICE VISIT (OUTPATIENT)
Dept: FAMILY MEDICINE CLINIC | Facility: CLINIC | Age: 33
End: 2018-04-05

## 2018-04-05 VITALS
SYSTOLIC BLOOD PRESSURE: 130 MMHG | HEIGHT: 72 IN | DIASTOLIC BLOOD PRESSURE: 90 MMHG | OXYGEN SATURATION: 98 % | HEART RATE: 108 BPM | TEMPERATURE: 98.5 F | BODY MASS INDEX: 32.37 KG/M2 | RESPIRATION RATE: 16 BRPM | WEIGHT: 239 LBS

## 2018-04-05 VITALS
SYSTOLIC BLOOD PRESSURE: 130 MMHG | HEART RATE: 108 BPM | RESPIRATION RATE: 16 BRPM | OXYGEN SATURATION: 98 % | DIASTOLIC BLOOD PRESSURE: 90 MMHG | TEMPERATURE: 98.5 F | BODY MASS INDEX: 32.37 KG/M2 | HEIGHT: 72 IN | WEIGHT: 239 LBS

## 2018-04-05 DIAGNOSIS — M75.111 INCOMPLETE TEAR OF RIGHT ROTATOR CUFF: ICD-10-CM

## 2018-04-05 DIAGNOSIS — M54.12 CERVICAL RADICULOPATHY: ICD-10-CM

## 2018-04-05 DIAGNOSIS — F32.A DEPRESSION, UNSPECIFIED DEPRESSION TYPE: Primary | ICD-10-CM

## 2018-04-05 DIAGNOSIS — M75.41 IMPINGEMENT SYNDROME OF RIGHT SHOULDER: ICD-10-CM

## 2018-04-05 DIAGNOSIS — M25.511 ACUTE PAIN OF RIGHT SHOULDER: ICD-10-CM

## 2018-04-05 DIAGNOSIS — Z02.6 ENCOUNTER RELATED TO WORKER'S COMPENSATION CLAIM: Primary | ICD-10-CM

## 2018-04-05 DIAGNOSIS — K64.9 HEMORRHOIDS, UNSPECIFIED HEMORRHOID TYPE: ICD-10-CM

## 2018-04-05 DIAGNOSIS — M54.2 NECK PAIN: ICD-10-CM

## 2018-04-05 PROCEDURE — 99213 OFFICE O/P EST LOW 20 MIN: CPT | Performed by: NURSE PRACTITIONER

## 2018-04-05 PROCEDURE — 99214 OFFICE O/P EST MOD 30 MIN: CPT | Performed by: NURSE PRACTITIONER

## 2018-04-05 RX ORDER — BUPROPION HYDROCHLORIDE 150 MG/1
150 TABLET ORAL DAILY
Qty: 30 TABLET | Refills: 5 | Status: SHIPPED | OUTPATIENT
Start: 2018-04-05 | End: 2018-10-29 | Stop reason: SDUPTHER

## 2018-04-05 RX ORDER — LIDOCAINE 50 MG/G
OINTMENT TOPICAL 2 TIMES DAILY PRN
Qty: 30 G | Refills: 3 | Status: SHIPPED | OUTPATIENT
Start: 2018-04-05 | End: 2018-08-06

## 2018-04-05 RX ORDER — BUSPIRONE HYDROCHLORIDE 5 MG/1
5 TABLET ORAL 3 TIMES DAILY
Qty: 90 TABLET | Refills: 5 | Status: SHIPPED | OUTPATIENT
Start: 2018-04-05 | End: 2018-10-29 | Stop reason: SDUPTHER

## 2018-04-06 RX ORDER — TESTOSTERONE CYPIONATE 200 MG/ML
200 INJECTION, SOLUTION INTRAMUSCULAR
Status: CANCELLED | OUTPATIENT
Start: 2018-04-06 | End: 2018-08-04

## 2018-04-10 ENCOUNTER — DOCUMENTATION (OUTPATIENT)
Dept: FAMILY MEDICINE CLINIC | Facility: CLINIC | Age: 33
End: 2018-04-10

## 2018-04-10 NOTE — PROGRESS NOTES
[facility] has denied pa for Pramoxine HCL 1.0% foam, they state Proctosol HC cream 2.5% rectal and Proctozone HC cream 2.5% rectal is the preferred alternatives.

## 2018-04-24 ENCOUNTER — OFFICE VISIT (OUTPATIENT)
Dept: ORTHOPEDIC SURGERY | Facility: CLINIC | Age: 33
End: 2018-04-24

## 2018-04-24 VITALS — HEIGHT: 72 IN | WEIGHT: 244 LBS | BODY MASS INDEX: 33.05 KG/M2

## 2018-04-24 DIAGNOSIS — M75.41 IMPINGEMENT SYNDROME OF RIGHT SHOULDER: ICD-10-CM

## 2018-04-24 DIAGNOSIS — M54.12 CERVICAL RADICULOPATHY: ICD-10-CM
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
## 2018-04-24 DIAGNOSIS — M25.511 ACUTE PAIN OF RIGHT SHOULDER: Primary | ICD-10-CM

## 2018-04-24 DIAGNOSIS — R79.89 LOW TESTOSTERONE: Primary | ICD-10-CM

## 2018-04-24 DIAGNOSIS — M75.111 INCOMPLETE TEAR OF RIGHT ROTATOR CUFF: ICD-10-CM

## 2018-04-24 PROCEDURE — 99214 OFFICE O/P EST MOD 30 MIN: CPT | Performed by: ORTHOPAEDIC SURGERY

## 2018-04-24 PROCEDURE — 99080 SPECIAL REPORTS OR FORMS: CPT | Performed by: ORTHOPAEDIC SURGERY

## 2018-04-24 RX ORDER — TESTOSTERONE CYPIONATE 200 MG/ML
200 INJECTION, SOLUTION INTRAMUSCULAR
Status: SHIPPED | OUTPATIENT
Start: 2018-04-24 | End: 2018-10-21

## 2018-04-24 NOTE — PROGRESS NOTES
Subjective   Marko Jaquez is a 32 y.o. male who presents to the office for F/U of worker's comp.     History of Present Illness     This is a worker's comp visit.      11/21/17 Initial Visit for worker's comp.  Refer to initial visit for details on initial injury.      MRI done on 12/21/17 at [facility] of right shoulder with and without contrast.   Impression:  1. Mild supraspinatus tendinosis.  2. Mild infraspinatus tendinosis with evidence of a small focal full-thickness perforation.  3. Mild scapular stenosis.  4. Moderate acromioclavicular joint primary osteoarthritic  Changes are noted. There is mildly enhancing bone marrow edema in the distal clavicle which may be related to degenerative changes or bone contusion if there is a history of trauma.  5. Mild edema and synovial prominence in the rotator interval is noted which can be associated with adhesive capsulitis.  6. Abnormal degenerative appearing blunting suggestive of scarring of the anterior inferior labrum, with evidence of a tear of the posterior inferior labrum.       Office visit 3/13/18 with Dr. Miller orthopedic specialist: Patient stated steroid injection of right shoulder done on 2/13 did not help, patient was not going to physical therapy because it was not approved her Worker's Comp. and then he got approved at a different physical therapy place so he will start doing physical therapy again.  Patient states he is not working.  He has not a numbness and tingling currently but if he tries to do anything physical he has weakness in his right arm.  He is having pain and night that involves his right shoulder and goes down his arm.  Diagnosis of acute pain of right shoulder, work-related injury, impingement of right shoulder, incomplete tear of rotator cuff of right shoulder, neck pain, cervical radiculopathy.  Patient referred to PT and MRI ordered of neck.  Weight loss plan discussed with patient.  Patient will begin physical  therapy with range of motion and strengthening and the shoulder but also with traction of the cervical spine cervical spine exercises.  Recommend that he continues meloxicam.  Follow-up after MRI to reassess results of MRI as well as changes with physical therapy, we discussed approximately 4-5 weeks to allow changes with physical therapy  except if MRI has significant findings.  Continue off work till next appointment.    Today for/5/18, patient states weakness and pain of right shoulder and arm with use and lifting.  Patient states that he has started physical therapy across the street because insurance will not cover him to do physical therapy here.  Patient states some tension of the right side of his ability as well.  Overall controlled with physical therapy, baclofen, lidocaine ointment, and meloxicam.    This is a worker's comp visit.     The following portions of the patient's history were reviewed and updated as appropriate: allergies, current medications, past family history, past medical history, past social history, past surgical history and problem list.    Review of Systems   Musculoskeletal: Positive for arthralgias (bilateral knee, right shoulder) and neck pain (right sided).   Neurological: Positive for numbness. Negative for weakness.       Past Medical History:   Diagnosis Date   • Acute bronchitis    • Acute pharyngitis    • Backache    • Chronic low back pain    • Dysfunction of eustachian tube    • Exanthematous disorder    • Generalized abdominal pain    • Headache    • Health examination of defined sub-group    • Heat exhaustion    • Influenza- like illness    • Intervertebral disc prolapse    • Low back pain     with radiculopathy   • Malaise and fatigue    • Pain in elbow    • Pneumonia    • Sciatica    • Skin sensation disturbance        History reviewed. No pertinent family history.       Objective   /90   Pulse 108   Temp 98.5 °F (36.9 °C) (Temporal Artery )   Resp 16   Ht  "182.9 cm (72\")   Wt 108 kg (239 lb)   SpO2 98%   BMI 32.41 kg/m²   Physical Exam   Constitutional: He appears well-developed and well-nourished. No distress.   Cardiovascular: Normal rate, regular rhythm, normal heart sounds and intact distal pulses.  Exam reveals no gallop and no friction rub.    No murmur heard.  Pulmonary/Chest: Effort normal and breath sounds normal. No respiratory distress. He has no wheezes. He has no rales.   Musculoskeletal:        Right shoulder: He exhibits decreased range of motion, tenderness, bony tenderness, pain and decreased strength. He exhibits no swelling, no effusion, no crepitus, no deformity, no laceration, no spasm and normal pulse.        Cervical back: He exhibits tenderness (right side), pain (right side) and spasm. He exhibits normal range of motion, no bony tenderness, no swelling, no edema, no deformity, no laceration and normal pulse.   Psychiatric: He has a normal mood and affect. His behavior is normal.   Nursing note and vitals reviewed.       PHQ-2/PHQ-9 Depression Screening 4/5/2018   Little interest or pleasure in doing things 0   Feeling down, depressed, or hopeless 0   Trouble falling or staying asleep, or sleeping too much -   Feeling tired or having little energy -   Poor appetite or overeating -   Feeling bad about yourself - or that you are a failure or have let yourself or your family down -   Trouble concentrating on things, such as reading the newspaper or watching television -   Moving or speaking so slowly that other people could have noticed. Or the opposite - being so fidgety or restless that you have been moving around a lot more than usual -   Thoughts that you would be better off dead, or of hurting yourself in some way -   Total Score 0   If you checked off any problems, how difficult have these problems made it for you to do your work, take care of things at home, or get along with other people? -         Assessment/Plan   Marko was seen today " for follow-up.    Diagnoses and all orders for this visit:    Encounter related to worker's compensation claim    Neck pain    Cervical radiculopathy    Incomplete tear of right rotator cuff    Impingement syndrome of right shoulder    Acute pain of right shoulder         This is a worker's comp visit.     Pt here for worker's comp accident with with neck pain and right shoulder pain.  Orthopedics diagnosed with cervical radiculopathy, incomplete tear of rotator cuff on right side, and impingement syndrome of right shoulder.  Patient will continue meloxicam, lidocaine gel, and baclofen when necessary for pain.  Patient will remain out of work but also maintain the restrictions of no lifting anything more than 5 pounds.  Patient will go to physical therapy.  Patient will follow-up in one month.    Patient educated to follow-up sooner than next scheduled appointment if condition(s) worse or do not improve. Patient states understanding and is in agreeance with plan of care. An After Visit Summary was printed and given to the patient.    This is a worker's comp visit.     Current Outpatient Prescriptions:   •  baclofen (LIORESAL) 10 MG tablet, Take 1 tablet by mouth 2 (Two) Times a Day As Needed for Muscle Spasms., Disp: 90 tablet, Rfl: 1  •  Calcium Carb-Cholecalciferol (CALCIUM-VITAMIN D) 500-200 MG-UNIT per tablet, Take 1 tablet by mouth 2 (Two) Times a Day With Meals. For Vit D/Calcium supplementation, Disp: 60 tablet, Rfl: 11  •  coenzyme Q10 100 MG capsule, Take 100 mg by mouth Daily., Disp: , Rfl:   •  Esomeprazole Magnesium (NEXIUM 24HR PO), Take  by mouth., Disp: , Rfl:   •  lidocaine (XYLOCAINE) 5 % ointment, Apply  topically At Night As Needed for Mild Pain  or Moderate Pain ., Disp: 50 g, Rfl: 0  •  meloxicam (MOBIC) 15 MG tablet, Take 1 tablet by mouth Daily., Disp: 30 tablet, Rfl: 5  •  buPROPion XL (WELLBUTRIN XL) 150 MG 24 hr tablet, Take 1 tablet by mouth Daily., Disp: 30 tablet, Rfl: 5  •  busPIRone  (BUSPAR) 5 MG tablet, Take 1 tablet by mouth 3 (Three) Times a Day., Disp: 90 tablet, Rfl: 5  •  hydrocortisone (PROCTOZONE-HC) 2.5 % rectal cream, Insert  into the rectum 2 (Two) Times a Day., Disp: 28.35 g, Rfl: 3  •  lidocaine (XYLOCAINE) 5 % ointment, Apply  topically 2 (Two) Times a Day As Needed (hemorrhoid pain)., Disp: 30 g, Rfl: 3  •  pramoxine-mineral oil-zinc (TUCKS) 1-12.5 % ointment rectal ointment, Insert 1 application into the rectum Every 4 (Four) Hours As Needed for Hemorrhoids., Disp: 28.3 each, Rfl: 3      CLOVIS Arreola        This document has been electronically signed by CLOVIS Arreola on April 24, 2018 2:04 PM      EMR/Transcription Dragon Disclaimer:  Some of this note may be an electronic dragon transcription/translation of spoken language to printed text. The electronic translation of spoken language may permit erroneous, or at times, nonsensical words or phrases to be inadvertently transcribed. Although I have reviewed the note for such errors, some may still exist.

## 2018-04-24 NOTE — PROGRESS NOTES
Marko Jaquez is a 32 y.o. male returns for     Chief Complaint   Patient presents with   • Right Shoulder - Follow-up, Pain, Work Related Injury   • Results     mri cspine       HISTORY OF PRESENT ILLNESS: physical therapy at [facility] 3 days/week.   Patient does not feel as if he is getting any better.  He has been working with physical therapy.  He reports that he had one episode of cervical traction that made his symptoms significantly worse and he took approximately 3 days to get over that one episode.  He does report that meloxicam helps.  He still has numbness and tingling into the thumb index and middle fingers of the right hand.  His initial injury involved picking up a brownish block and having a pull in his right arm.  He also states having jammed his head into the canopy of his scoop just prior to that as well.       CONCURRENT MEDICAL HISTORY:    Past Medical History:   Diagnosis Date   • Acute bronchitis    • Acute pharyngitis    • Backache    • Chronic low back pain    • Dysfunction of eustachian tube    • Exanthematous disorder    • Generalized abdominal pain    • Headache    • Health examination of defined sub-group    • Heat exhaustion    • Influenza- like illness    • Intervertebral disc prolapse    • Low back pain     with radiculopathy   • Malaise and fatigue    • Pain in elbow    • Pneumonia    • Sciatica    • Skin sensation disturbance        Allergies   Allergen Reactions   • Lortab [Hydrocodone-Acetaminophen]          Current Outpatient Prescriptions:   •  baclofen (LIORESAL) 10 MG tablet, Take 1 tablet by mouth 2 (Two) Times a Day As Needed for Muscle Spasms., Disp: 90 tablet, Rfl: 1  •  buPROPion XL (WELLBUTRIN XL) 150 MG 24 hr tablet, Take 1 tablet by mouth Daily., Disp: 30 tablet, Rfl: 5  •  busPIRone (BUSPAR) 5 MG tablet, Take 1 tablet by mouth 3 (Three) Times a Day., Disp: 90 tablet, Rfl: 5  •  Calcium Carb-Cholecalciferol (CALCIUM-VITAMIN D) 500-200 MG-UNIT per tablet, Take 1 tablet  "by mouth 2 (Two) Times a Day With Meals. For Vit D/Calcium supplementation, Disp: 60 tablet, Rfl: 11  •  coenzyme Q10 100 MG capsule, Take 100 mg by mouth Daily., Disp: , Rfl:   •  Esomeprazole Magnesium (NEXIUM 24HR PO), Take  by mouth., Disp: , Rfl:   •  hydrocortisone (PROCTOZONE-HC) 2.5 % rectal cream, Insert  into the rectum 2 (Two) Times a Day., Disp: 28.35 g, Rfl: 3  •  lidocaine (XYLOCAINE) 5 % ointment, Apply  topically At Night As Needed for Mild Pain  or Moderate Pain ., Disp: 50 g, Rfl: 0  •  lidocaine (XYLOCAINE) 5 % ointment, Apply  topically 2 (Two) Times a Day As Needed (hemorrhoid pain)., Disp: 30 g, Rfl: 3  •  meloxicam (MOBIC) 15 MG tablet, Take 1 tablet by mouth Daily., Disp: 30 tablet, Rfl: 5  •  pramoxine-mineral oil-zinc (TUCKS) 1-12.5 % ointment rectal ointment, Insert 1 application into the rectum Every 4 (Four) Hours As Needed for Hemorrhoids., Disp: 28.3 each, Rfl: 3    Current Facility-Administered Medications:   •  Testosterone Cypionate (DEPOTESTOTERONE CYPIONATE) injection 200 mg, 200 mg, Intramuscular, Q30 Days, Olimpia Reid MD    Past Surgical History:   Procedure Laterality Date   • INJECTION OF MEDICATION      Depo Medrol (80mg)   • INJECTION OF MEDICATION      Dexamethasone (5mg)   • INJECTION OF MEDICATION      Kenalog (40mg)   • INJECTION OF MEDICATION      Rocephin (1 gm)   • INJECTION OF MEDICATION      Toradol (60mg)       ROS  No fevers or chills.  No chest pain or shortness of air.  No GI or  disturbances.  All other systems reviewed as negative.    PHYSICAL EXAMINATION:       Ht 182.9 cm (72\")   Wt 111 kg (244 lb)   BMI 33.09 kg/m²     Physical Exam   Constitutional: He is oriented to person, place, and time. He appears well-developed and well-nourished.   Neurological: He is alert and oriented to person, place, and time.   Psychiatric: He has a normal mood and affect. His behavior is normal. Judgment and thought content normal.       GAIT:     [x]  Normal  []  " Antalgic    Assistive device: [x]  None  []  Walker     []  Crutches  []  Cane     []  Wheelchair  []  Stretcher    Back Exam     Comments:  Neck exam is slightly limited with motion.  Spurling and quadrant testing are both negative today.      Right Shoulder Exam     Tenderness   Right shoulder tenderness location: Very tender over the coracoid process.    Range of Motion   Active Abduction: 160   Forward Flexion: 150   Internal Rotation 0 degrees: L1     Muscle Strength   Abduction: 4/5   Supraspinatus: 4/5     Tests   Hawkin's test: positive (Mild)  Impingement: negative    Other   Erythema: absent  Sensation: normal  Pulse: present                  Mri cspine 3/30/2018 @ [facility]  Impression  Generalized disc bulge at the c3/c4 level without evidence of a focal disc herniation or significant central canal stenosis.       ASSESSMENT:    Diagnoses and all orders for this visit:    Acute pain of right shoulder  -     EMG & Nerve Conduction Test; Future  -     Ambulatory Referral to Neurosurgery    Work related injury  -     EMG & Nerve Conduction Test; Future  -     Ambulatory Referral to Neurosurgery    Impingement syndrome of right shoulder  -     EMG & Nerve Conduction Test; Future  -     Ambulatory Referral to Neurosurgery    Incomplete tear of right rotator cuff  -     EMG & Nerve Conduction Test; Future  -     Ambulatory Referral to Neurosurgery    Cervical radiculopathy  -     EMG & Nerve Conduction Test; Future  -     Ambulatory Referral to Neurosurgery      Discussion with  was held with the patient in the room.  We discussed work activity, progression, further treatment options, and return to work.  All questions were answered.    PLAN    Patient's Body mass index is 33.09 kg/m². BMI is above normal parameters. Follow-up plan includes:  exercise counseling and nutrition counseling.    We discussed that he is going to remain off work.  We also discussed that in reality if he worked in an environment  that would allow him to return with no lifting, no overhead work, no climbing, and no forceful pushing or pulling then he could return to work.  Light duty is not offered at his facility to this extent.    Long discussion with the patient, his wife, their kids, and the  in regards to further treatment options.  I am still concerned with the extent to which he is having narrow-type symptoms in his right arm.  The fact that he had worsening of his numbness, tingling, and burning in his right arm with cervical traction is worrisome.    We discussed an evaluation by neurosurgery to assess for possibility of nerve root impingement or injury.  The MRI was not significant yet his symptoms persist.  He is also going to get an EMG to assess for neurologic documentation of nerve root involvement.    Return in about 6 weeks (around 6/5/2018) for recheck.    David Miller MD

## 2018-04-24 NOTE — PROGRESS NOTES
Subjective   Marko Jaquez is a 32 y.o. male who presents to the office for f/u on depression.      History of Present Illness     Depression: Developed and progressively gotten worse over the past 2 months.  Patient states anger has gotten a lot worse to the point where he has gotten overly upset with his children and wife, wife states that that is not normal for him.  Patient states he wants to just lay around and sleep, has no motivation and no energy.  Patient has been off work for about half a year related to work injury, and patient has worked all his life since he was really young and could be extreme contributing factor to this.  Patient states that he has had no issues with fatigue in the past, however he has had issues with being short tempered but now he is extremely short tempered and irritated very easily.  Discussed options for treating depression and anger, concerned about starting patient on SSRI or SNRI due to the fact that symptoms may get worse over the first 2 weeks of initiating this medication, wife is concerned about starting patient on Wellbutrin because uncle has had issues with it when he was on it.  Patient would also like to stop smoking, has tried gum in the past but it does not work because it makes him nauseous. On 3/5/18 visit, discussed with patient that Wellbutrin and Chantix help with smoking cessation, patient is agreeable to start on Wellbutrin as well as BuSpar.  Patient also agreeable to doing lab work for possible etiologies of fatigue as well.  P't denies wanting to harm himself or others, but has had the urge to fight his neighbors due to conflict issues with them and his short temper.  On 3/5/18, patient placed on BuSpar 5 mg 3 times a day and Wellbutrin 150 mg daily.  Fatigue lab workup was done showing the patient has a testosterone level in the 300s, patient agreeable to starting testosterone monthly.    Hemorrhoids-acute problem  Patient states his been going on for  "couple months, small amount of blood with wiping but no excessive bleeding or bleeding on stool or in toilet.  Patient denies constipation but states his hemorrhoids are really bad.    The following portions of the patient's history were reviewed and updated as appropriate: allergies, current medications, past family history, past medical history, past social history, past surgical history and problem list.    Review of Systems   Constitutional: Positive for fatigue.   Musculoskeletal: Positive for arthralgias (bilateral knee, right shoulder) and neck pain (right sided).   Neurological: Positive for numbness. Negative for weakness.   Psychiatric/Behavioral: Negative for agitation, self-injury and sleep disturbance. The patient is not nervous/anxious.        Past Medical History:   Diagnosis Date   • Acute bronchitis    • Acute pharyngitis    • Backache    • Chronic low back pain    • Dysfunction of eustachian tube    • Exanthematous disorder    • Generalized abdominal pain    • Headache    • Health examination of defined sub-group    • Heat exhaustion    • Influenza- like illness    • Intervertebral disc prolapse    • Low back pain     with radiculopathy   • Malaise and fatigue    • Pain in elbow    • Pneumonia    • Sciatica    • Skin sensation disturbance        No family history on file.       Objective   /90   Pulse 108   Temp 98.5 °F (36.9 °C) (Temporal Artery )   Resp 16   Ht 182.9 cm (72\")   Wt 108 kg (239 lb)   SpO2 98%   BMI 32.41 kg/m²   Physical Exam   Constitutional: He is oriented to person, place, and time. He appears well-developed and well-nourished. No distress.   Cardiovascular: Normal rate, regular rhythm, normal heart sounds and intact distal pulses.  Exam reveals no gallop and no friction rub.    No murmur heard.  Pulmonary/Chest: Effort normal and breath sounds normal. No respiratory distress. He has no wheezes. He has no rales.   Musculoskeletal:        Right shoulder: He exhibits " decreased range of motion, tenderness, bony tenderness, pain and decreased strength. He exhibits no swelling, no effusion, no crepitus, no deformity, no laceration, no spasm and normal pulse.        Cervical back: He exhibits tenderness (right side) and pain (right side). He exhibits normal range of motion, no bony tenderness, no swelling, no edema, no deformity, no laceration, no spasm and normal pulse.        Arms:  Neurological: He is alert and oriented to person, place, and time. He is not disoriented.   Psychiatric: His speech is normal and behavior is normal. Thought content normal. His mood appears not anxious. His affect is not angry. He is not actively hallucinating. Cognition and memory are normal. He does not exhibit a depressed mood.   Patient is dressed appropriately for weather and situation, makes eye contact, and engages in conversation.  He is attentive.   Nursing note and vitals reviewed.       PHQ-2/PHQ-9 Depression Screening 4/5/2018   Little interest or pleasure in doing things 0   Feeling down, depressed, or hopeless 0   Trouble falling or staying asleep, or sleeping too much -   Feeling tired or having little energy -   Poor appetite or overeating -   Feeling bad about yourself - or that you are a failure or have let yourself or your family down -   Trouble concentrating on things, such as reading the newspaper or watching television -   Moving or speaking so slowly that other people could have noticed. Or the opposite - being so fidgety or restless that you have been moving around a lot more than usual -   Thoughts that you would be better off dead, or of hurting yourself in some way -   Total Score 0   If you checked off any problems, how difficult have these problems made it for you to do your work, take care of things at home, or get along with other people? -         Assessment/Plan   Marko was seen today for depression.    Diagnoses and all orders for this visit:    Depression,  unspecified depression type  -     buPROPion XL (WELLBUTRIN XL) 150 MG 24 hr tablet; Take 1 tablet by mouth Daily.  -     busPIRone (BUSPAR) 5 MG tablet; Take 1 tablet by mouth 3 (Three) Times a Day.    Hemorrhoids, unspecified hemorrhoid type  -     pramoxine-mineral oil-zinc (TUCKS) 1-12.5 % ointment rectal ointment; Insert 1 application into the rectum Every 4 (Four) Hours As Needed for Hemorrhoids.  -     lidocaine (XYLOCAINE) 5 % ointment; Apply  topically 2 (Two) Times a Day As Needed (hemorrhoid pain).    Other orders  -     Cancel: Testosterone Cypionate (DEPOTESTOTERONE CYPIONATE) injection 200 mg; Inject 1 mL into the shoulder, thigh, or buttocks Every 30 (Thirty) Days.           Depression/agitation-chronic problem, controlled with Wellbutrin and BuSpar.    Testosterone deficiency-acute new problem-patient is going to start on testosterone injections once a month, educated that after second injection right before the third injection, I wanted him to get his testosterone level checked again.    Hemorrhoid-acute new problem, uncontrolled  Prescribed Tucks and lidocaine as well as patient able to use the medication that he is using OTC.  Also explained to patient that even though he is not constipated having infrequent stools as well as harder stools can contribute to hemorrhoids same his stream.  Encouraged Colace stool softener 100 mg up to 3 times a day, fluids, and fiber.    Follow-up in one month     Patient educated to follow-up sooner than next scheduled appointment if condition(s) worse or do not improve. Patient states understanding and is in agreeance with plan of care. An After Visit Summary was printed and given to the patient.      CLOVIS Arreola        This document has been electronically signed by CLOVIS Arreola on April 24, 2018 1:55 PM      EMR/Transcription Dragon Disclaimer:  Some of this note may be an electronic dragon transcription/translation of spoken language to  printed text. The electronic translation of spoken language may permit erroneous, or at times, nonsensical words or phrases to be inadvertently transcribed. Although I have reviewed the note for such errors, some may still exist.

## 2018-05-03 ENCOUNTER — OFFICE VISIT (OUTPATIENT)
Dept: FAMILY MEDICINE CLINIC | Facility: CLINIC | Age: 33
End: 2018-05-03

## 2018-05-03 ENCOUNTER — CLINICAL SUPPORT (OUTPATIENT)
Dept: FAMILY MEDICINE CLINIC | Facility: CLINIC | Age: 33
End: 2018-05-03

## 2018-05-03 VITALS
BODY MASS INDEX: 32.64 KG/M2 | WEIGHT: 241 LBS | SYSTOLIC BLOOD PRESSURE: 120 MMHG | OXYGEN SATURATION: 98 % | RESPIRATION RATE: 16 BRPM | DIASTOLIC BLOOD PRESSURE: 80 MMHG | HEIGHT: 72 IN | TEMPERATURE: 97.7 F | HEART RATE: 72 BPM

## 2018-05-03 DIAGNOSIS — R20.0 NUMBNESS AND TINGLING OF RIGHT ARM: ICD-10-CM

## 2018-05-03 DIAGNOSIS — M54.2 NECK PAIN ON RIGHT SIDE: ICD-10-CM

## 2018-05-03 DIAGNOSIS — M75.41 IMPINGEMENT SYNDROME OF RIGHT SHOULDER: Primary | ICD-10-CM

## 2018-05-03 DIAGNOSIS — R20.2 NUMBNESS AND TINGLING OF RIGHT ARM: ICD-10-CM

## 2018-05-03 DIAGNOSIS — R79.89 LOW TESTOSTERONE: Primary | ICD-10-CM

## 2018-05-03 DIAGNOSIS — M25.511 ACUTE PAIN OF RIGHT SHOULDER: ICD-10-CM

## 2018-05-03 DIAGNOSIS — Z02.6 ENCOUNTER RELATED TO WORKER'S COMPENSATION CLAIM: ICD-10-CM

## 2018-05-03 DIAGNOSIS — M75.111 INCOMPLETE TEAR OF RIGHT ROTATOR CUFF: ICD-10-CM

## 2018-05-03 DIAGNOSIS — M54.12 CERVICAL RADICULOPATHY: ICD-10-CM

## 2018-05-03 PROCEDURE — 96372 THER/PROPH/DIAG INJ SC/IM: CPT | Performed by: NURSE PRACTITIONER

## 2018-05-03 PROCEDURE — 99214 OFFICE O/P EST MOD 30 MIN: CPT | Performed by: NURSE PRACTITIONER

## 2018-05-03 RX ORDER — BACLOFEN 10 MG/1
10 TABLET ORAL 2 TIMES DAILY PRN
Qty: 90 TABLET | Refills: 5 | Status: SHIPPED | OUTPATIENT
Start: 2018-05-03 | End: 2018-05-07

## 2018-05-03 RX ADMIN — TESTOSTERONE CYPIONATE 200 MG: 200 INJECTION, SOLUTION INTRAMUSCULAR at 11:23

## 2018-05-07 DIAGNOSIS — M54.2 NECK PAIN ON RIGHT SIDE: ICD-10-CM

## 2018-05-07 DIAGNOSIS — M25.511 ACUTE PAIN OF RIGHT SHOULDER: ICD-10-CM

## 2018-05-07 RX ORDER — BACLOFEN 10 MG/1
10 TABLET ORAL 2 TIMES DAILY PRN
Qty: 90 TABLET | Refills: 1 | Status: SHIPPED | OUTPATIENT
Start: 2018-05-07 | End: 2018-08-06

## 2018-05-16 ENCOUNTER — DOCUMENTATION (OUTPATIENT)
Dept: FAMILY MEDICINE CLINIC | Facility: CLINIC | Age: 33
End: 2018-05-16

## 2018-05-16 NOTE — PROGRESS NOTES
"Reviewed office visit with Dr. Whitmore neurosurgeon on 5/10/18.     Patient was referred to see a neurosurgeon for further evaluation.  Dr. Whitmore reviewed. \" MRI of cervical spine, stating that there is a little central disc bulge at C3-C4 and no neural compression and no cord compression and no real acute findings of any significance.  Assessment and plan stated that Mr. Jaquez is a gentleman who had a work-related injury and hit his head about 7 months ago.   I see nothing radiographically that would cause him to have his right arm pain.  He could've had some neck pain just from the jarring but nothing surgical, and nothing that would require restrictions.  I will see him back on a when necessary basis.  There are no restrictions from my standpoint.  Most importantly, there is nothing that would cause the right shoulder pain.\"  "

## 2018-05-22 ENCOUNTER — OFFICE VISIT (OUTPATIENT)
Dept: ORTHOPEDIC SURGERY | Facility: CLINIC | Age: 33
End: 2018-05-22

## 2018-05-22 VITALS — WEIGHT: 243.7 LBS | BODY MASS INDEX: 33.01 KG/M2 | HEIGHT: 72 IN
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
## 2018-05-22 DIAGNOSIS — M54.12 CERVICAL RADICULOPATHY: ICD-10-CM

## 2018-05-22 DIAGNOSIS — M75.111 INCOMPLETE TEAR OF RIGHT ROTATOR CUFF: Primary | ICD-10-CM

## 2018-05-22 DIAGNOSIS — M25.511 ACUTE PAIN OF RIGHT SHOULDER: ICD-10-CM

## 2018-05-22 DIAGNOSIS — M54.2 NECK PAIN: ICD-10-CM

## 2018-05-22 DIAGNOSIS — M75.41 IMPINGEMENT SYNDROME OF RIGHT SHOULDER: ICD-10-CM

## 2018-05-22 PROCEDURE — 20610 DRAIN/INJ JOINT/BURSA W/O US: CPT | Performed by: ORTHOPAEDIC SURGERY

## 2018-05-22 PROCEDURE — 99213 OFFICE O/P EST LOW 20 MIN: CPT | Performed by: ORTHOPAEDIC SURGERY

## 2018-05-22 PROCEDURE — 99080 SPECIAL REPORTS OR FORMS: CPT | Performed by: ORTHOPAEDIC SURGERY

## 2018-05-22 RX ORDER — LIDOCAINE HYDROCHLORIDE 10 MG/ML
2 INJECTION, SOLUTION INFILTRATION; PERINEURAL
Status: COMPLETED | OUTPATIENT
Start: 2018-05-22 | End: 2018-05-22

## 2018-05-22 RX ORDER — TRIAMCINOLONE ACETONIDE 40 MG/ML
40 INJECTION, SUSPENSION INTRA-ARTICULAR; INTRAMUSCULAR
Status: COMPLETED | OUTPATIENT
Start: 2018-05-22 | End: 2018-05-22

## 2018-05-22 RX ADMIN — LIDOCAINE HYDROCHLORIDE 2 ML: 10 INJECTION, SOLUTION INFILTRATION; PERINEURAL at 09:35

## 2018-05-22 RX ADMIN — TRIAMCINOLONE ACETONIDE 40 MG: 40 INJECTION, SUSPENSION INTRA-ARTICULAR; INTRAMUSCULAR at 09:35

## 2018-05-22 NOTE — PROGRESS NOTES
Marko Jaquez is a 32 y.o. male returns for     Chief Complaint   Patient presents with   • Cervical Spine - Follow-up   • Right Shoulder - Follow-up   • Results     EMG  [facility]  5/1/18       HISTORY OF PRESENT ILLNESS:  Patient is here for EMG results.  Patient states that his pain in the neck is better and is just a dull ache at times now.  He is continuing to have a sharp pain in his right shoulder.  No new injury is reported.  His initial injury was November 15, 2017 when he was lifting a [place] block and had a pop in his shoulder.  He has been unable to work since that time.  He has pain with repetitive motion and repetitive activity.  No physical therapy since mid April.  He did see spine surgery who felt that there was no surgical lesion involving his cervical spine and that the cervical findings were most likely not associated with his shoulder pain.     CONCURRENT MEDICAL HISTORY:    Past Medical History:   Diagnosis Date   • Acute bronchitis    • Acute pharyngitis    • Backache    • Chronic low back pain    • Dysfunction of eustachian tube    • Exanthematous disorder    • Generalized abdominal pain    • Headache    • Health examination of defined sub-group    • Heat exhaustion    • Influenza- like illness    • Intervertebral disc prolapse    • Low back pain     with radiculopathy   • Malaise and fatigue    • Pain in elbow    • Pneumonia    • Sciatica    • Skin sensation disturbance        Allergies   Allergen Reactions   • Lortab [Hydrocodone-Acetaminophen] Itching and Other (See Comments)     Completely itchy all over and heightens the pain doesnt decrease it feels like he is going to jump out of skin         Current Outpatient Prescriptions:   •  baclofen (LIORESAL) 10 MG tablet, TAKE 1 TABLET BY MOUTH 2 (TWO) TIMES A DAY AS NEEDED FOR MUSCLE SPASMS., Disp: 90 tablet, Rfl: 1  •  buPROPion XL (WELLBUTRIN XL) 150 MG 24 hr tablet, Take 1 tablet by mouth Daily., Disp: 30 tablet, Rfl: 5  •   "busPIRone (BUSPAR) 5 MG tablet, Take 1 tablet by mouth 3 (Three) Times a Day., Disp: 90 tablet, Rfl: 5  •  Calcium Carb-Cholecalciferol (CALCIUM-VITAMIN D) 500-200 MG-UNIT per tablet, Take 1 tablet by mouth 2 (Two) Times a Day With Meals. For Vit D/Calcium supplementation, Disp: 60 tablet, Rfl: 11  •  Esomeprazole Magnesium (NEXIUM 24HR PO), Take  by mouth., Disp: , Rfl:   •  lidocaine (XYLOCAINE) 5 % ointment, Apply  topically At Night As Needed for Mild Pain  or Moderate Pain ., Disp: 50 g, Rfl: 0  •  lidocaine (XYLOCAINE) 5 % ointment, Apply  topically 2 (Two) Times a Day As Needed (hemorrhoid pain)., Disp: 30 g, Rfl: 3  •  meloxicam (MOBIC) 15 MG tablet, Take 1 tablet by mouth Daily., Disp: 30 tablet, Rfl: 5  •  pramoxine-mineral oil-zinc (TUCKS) 1-12.5 % ointment rectal ointment, Insert 1 application into the rectum Every 4 (Four) Hours As Needed for Hemorrhoids., Disp: 28.3 each, Rfl: 3    Current Facility-Administered Medications:   •  Testosterone Cypionate (DEPOTESTOTERONE CYPIONATE) injection 200 mg, 200 mg, Intramuscular, Q30 Days, Olimpia Reid MD, 200 mg at 05/03/18 1123    Past Surgical History:   Procedure Laterality Date   • INJECTION OF MEDICATION      Depo Medrol (80mg)   • INJECTION OF MEDICATION      Dexamethasone (5mg)   • INJECTION OF MEDICATION      Kenalog (40mg)   • INJECTION OF MEDICATION      Rocephin (1 gm)   • INJECTION OF MEDICATION      Toradol (60mg)       ROS  No fevers or chills.  No chest pain or shortness of air.  No GI or  disturbances.    PHYSICAL EXAMINATION:       Ht 182.9 cm (72\")   Wt 111 kg (243 lb 11.2 oz)   BMI 33.05 kg/m²     Physical Exam   Constitutional: He is oriented to person, place, and time. He appears well-developed and well-nourished.   Neurological: He is alert and oriented to person, place, and time.   Psychiatric: He has a normal mood and affect. His behavior is normal. Judgment and thought content normal.       GAIT:     [x]  Normal  []  " Antalgic    Assistive device: [x]  None  []  Walker     []  Crutches  []  Cane     []  Wheelchair  []  Stretcher    Right Shoulder Exam     Tenderness   The patient is experiencing tenderness in the acromioclavicular joint.    Range of Motion   Active Abduction: 150 (180° with assistance)   Forward Flexion: 120 (150° with assistance)     Muscle Strength   Abduction: 5/5   Supraspinatus: 5/5     Tests   Cross Arm: positive  Hawkin's test: positive  Impingement: positive    Other   Erythema: absent  Sensation: normal  Pulse: present                    Kelley Keita MD  5/1/18      ASSESSMENT:    Diagnoses and all orders for this visit:    Incomplete tear of right rotator cuff  -     Ambulatory Referral to Physical Therapy Evaluate and treat; ROM, Strengthening    Acute pain of right shoulder  -     Ambulatory Referral to Physical Therapy Evaluate and treat; ROM, Strengthening  -     Large Joint Arthrocentesis    Work related injury  -     Ambulatory Referral to Physical Therapy Evaluate and treat; ROM, Strengthening  -     Large Joint Arthrocentesis    Impingement syndrome of right shoulder  -     Ambulatory Referral to Physical Therapy Evaluate and treat; ROM, Strengthening  -     Large Joint Arthrocentesis    Cervical radiculopathy  -     Ambulatory Referral to Physical Therapy Evaluate and treat; ROM, Strengthening    Neck pain  -     Ambulatory Referral to Physical Therapy Evaluate and treat; ROM, Strengthening        Large Joint Arthrocentesis  Date/Time: 5/22/2018 9:35 AM  Consent given by: patient  Site marked: site marked  Timeout: Immediately prior to procedure a time out was called to verify the correct patient, procedure, equipment, support staff and site/side marked as required   Supporting Documentation  Indications: pain   Procedure Details  Location: shoulder - R subacromial bursa  Preparation: Patient was prepped and draped in the usual sterile fashion  Needle size: 22 G  Approach: posterior  Medications  administered: 40 mg triamcinolone acetonide 40 MG/ML; 2 mL lidocaine 1 %  Patient tolerance: patient tolerated the procedure well with no immediate complications          PLAN    Patient will begin physical therapy for 2 weeks to work on shoulder motion and strengthening exercises.  We discussed a repeat steroid injection into the subacromial space of the right shoulder.  Possibility for shoulder arthroscopy with evaluation of the rotator cuff.  However, if he continues to improve then that may not be necessary.    Plan follow-up in 7 weeks after the physical therapy and work conditioning.  He could return to work if light duty were available.  That would consist of no lifting, no overhead work, no climbing, no forceful pushing or pulling.  It is my understanding that light duty is not available at his place of employment.    We also discussed that the findings on the EMG supported the diagnosis of carpal tunnel syndrome.  This diagnosis is not a part of his work-related injury and subsequent treatment.    I reviewed 's note and in that note he opined that there was no further treatment necessary for the cervical injury and that it not the source of his shoulder pain.    Patient's Body mass index is 33.05 kg/m². BMI is above normal parameters. Recommendations include: exercise counseling and nutrition counseling     Discussion with  was held with the patient in the room.  We discussed work activity, progression, further treatment options, and return to work.  All questions were answered.    The patient was seen with his wife, 2 young kids, and the .    Return in about 8 weeks (around 7/17/2018).    David Miller MD

## 2018-05-28 DIAGNOSIS — E34.9 TESTOSTERONE DEFICIENCY: Primary | ICD-10-CM

## 2018-05-29 NOTE — PROGRESS NOTES
Subjective   Marko Jaquez is a 32 y.o. male who presents to the office for F/U of worker's comp.     History of Present Illness     This is a worker's comp visit.      11/21/17 Initial Visit for worker's comp.  Refer to initial visit for details on initial injury.      MRI done on 12/21/17 at [facility] of right shoulder with and without contrast.   Impression:  1. Mild supraspinatus tendinosis.  2. Mild infraspinatus tendinosis with evidence of a small focal full-thickness perforation.  3. Mild scapular stenosis.  4. Moderate acromioclavicular joint primary osteoarthritic  Changes are noted. There is mildly enhancing bone marrow edema in the distal clavicle which may be related to degenerative changes or bone contusion if there is a history of trauma.  5. Mild edema and synovial prominence in the rotator interval is noted which can be associated with adhesive capsulitis.  6. Abnormal degenerative appearing blunting suggestive of scarring of the anterior inferior labrum, with evidence of a tear of the posterior inferior labrum.   3/5/18: xray cervical spine: straightening of cervical lordosis.  3/30/18: MRI of cervical spine: impression: generalized disc buldge at C3/C4 with no focal disc herniation or significant central canal stenosis.       Today 5/3/18, pt states pain still present  Overall controlled with physical therapy, baclofen, lidocaine ointment, and meloxicam. Pt has appt with spine specialist on the 10th and is then f/u with orthopedic specialist again.     This is a worker's comp visit.     The following portions of the patient's history were reviewed and updated as appropriate: allergies, current medications, past family history, past medical history, past social history, past surgical history and problem list.    Review of Systems   Musculoskeletal: Positive for arthralgias (bilateral knee, right shoulder) and neck pain (right sided).   Neurological: Positive for numbness. Negative  "for weakness.       Past Medical History:   Diagnosis Date   • Acute bronchitis    • Acute pharyngitis    • Backache    • Chronic low back pain    • Dysfunction of eustachian tube    • Exanthematous disorder    • Generalized abdominal pain    • Headache    • Health examination of defined sub-group    • Heat exhaustion    • Influenza- like illness    • Intervertebral disc prolapse    • Low back pain     with radiculopathy   • Malaise and fatigue    • Pain in elbow    • Pneumonia    • Sciatica    • Skin sensation disturbance        History reviewed. No pertinent family history.       Objective   /80   Pulse 72   Temp 97.7 °F (36.5 °C) (Temporal Artery )   Resp 16   Ht 182.9 cm (72\")   Wt 109 kg (241 lb)   SpO2 98%   BMI 32.69 kg/m²   Physical Exam   Constitutional: He appears well-developed and well-nourished. No distress.   Cardiovascular: Normal rate, regular rhythm, normal heart sounds and intact distal pulses.  Exam reveals no gallop and no friction rub.    No murmur heard.  Pulmonary/Chest: Effort normal and breath sounds normal. No respiratory distress. He has no wheezes. He has no rales.   Musculoskeletal:        Right shoulder: He exhibits decreased range of motion, tenderness, bony tenderness, pain and decreased strength. He exhibits no swelling, no effusion, no crepitus, no deformity, no laceration, no spasm and normal pulse.        Cervical back: He exhibits tenderness (right side), pain (right side) and spasm. He exhibits normal range of motion, no bony tenderness, no swelling, no edema, no deformity, no laceration and normal pulse.   Psychiatric: He has a normal mood and affect. His behavior is normal.   Nursing note and vitals reviewed.       PHQ-2/PHQ-9 Depression Screening 5/3/2018   Little interest or pleasure in doing things 0   Feeling down, depressed, or hopeless 1   Trouble falling or staying asleep, or sleeping too much -   Feeling tired or having little energy -   Poor appetite or " overeating -   Feeling bad about yourself - or that you are a failure or have let yourself or your family down -   Trouble concentrating on things, such as reading the newspaper or watching television -   Moving or speaking so slowly that other people could have noticed. Or the opposite - being so fidgety or restless that you have been moving around a lot more than usual -   Thoughts that you would be better off dead, or of hurting yourself in some way -   Total Score 1   If you checked off any problems, how difficult have these problems made it for you to do your work, take care of things at home, or get along with other people? -         Assessment/Plan   Marko was seen today for follow-up.    Diagnoses and all orders for this visit:    Impingement syndrome of right shoulder    Neck pain on right side  -     Discontinue: baclofen (LIORESAL) 10 MG tablet; Take 1 tablet by mouth 2 (Two) Times a Day As Needed for Muscle Spasms.    Acute pain of right shoulder  -     Discontinue: baclofen (LIORESAL) 10 MG tablet; Take 1 tablet by mouth 2 (Two) Times a Day As Needed for Muscle Spasms.    Encounter related to worker's compensation claim    Incomplete tear of right rotator cuff    Cervical radiculopathy    Numbness and tingling of right arm         This is a worker's comp visit.     Pt here for worker's comp accident with with neck pain and right shoulder pain.  Orthopedics diagnosed with cervical radiculopathy, incomplete tear of rotator cuff on right side, and impingement syndrome of right shoulder.  Patient will continue meloxicam, lidocaine gel, and baclofen when necessary for pain.  Patient will remain out of work but also maintain the restrictions of no lifting anything more than 5 pounds.  Patient will continue to go to physical therapy. Pt has appt with spine specialist on the 10th and is then f/u with orthopedic specialist again. Will f/u with pt after ortho appt.     Patient educated to follow-up sooner than  next scheduled appointment if condition(s) worse or do not improve. Patient states understanding and is in agreeance with plan of care. An After Visit Summary was printed and given to the patient.    This is a worker's comp visit.     Current Outpatient Prescriptions:   •  buPROPion XL (WELLBUTRIN XL) 150 MG 24 hr tablet, Take 1 tablet by mouth Daily., Disp: 30 tablet, Rfl: 5  •  busPIRone (BUSPAR) 5 MG tablet, Take 1 tablet by mouth 3 (Three) Times a Day., Disp: 90 tablet, Rfl: 5  •  Calcium Carb-Cholecalciferol (CALCIUM-VITAMIN D) 500-200 MG-UNIT per tablet, Take 1 tablet by mouth 2 (Two) Times a Day With Meals. For Vit D/Calcium supplementation, Disp: 60 tablet, Rfl: 11  •  Esomeprazole Magnesium (NEXIUM 24HR PO), Take  by mouth., Disp: , Rfl:   •  lidocaine (XYLOCAINE) 5 % ointment, Apply  topically At Night As Needed for Mild Pain  or Moderate Pain ., Disp: 50 g, Rfl: 0  •  meloxicam (MOBIC) 15 MG tablet, Take 1 tablet by mouth Daily., Disp: 30 tablet, Rfl: 5  •  pramoxine-mineral oil-zinc (TUCKS) 1-12.5 % ointment rectal ointment, Insert 1 application into the rectum Every 4 (Four) Hours As Needed for Hemorrhoids., Disp: 28.3 each, Rfl: 3  •  baclofen (LIORESAL) 10 MG tablet, TAKE 1 TABLET BY MOUTH 2 (TWO) TIMES A DAY AS NEEDED FOR MUSCLE SPASMS., Disp: 90 tablet, Rfl: 1  •  lidocaine (XYLOCAINE) 5 % ointment, Apply  topically 2 (Two) Times a Day As Needed (hemorrhoid pain)., Disp: 30 g, Rfl: 3    Current Facility-Administered Medications:   •  Testosterone Cypionate (DEPOTESTOTERONE CYPIONATE) injection 200 mg, 200 mg, Intramuscular, Q30 Days, Olimpia Reid MD, 200 mg at 05/03/18 1123      CLOVIS Arreola        This document has been electronically signed by CLOVIS Arreola on May 28, 2018 10:44 PM      EMR/Transcription Dragon Disclaimer:  Some of this note may be an electronic dragon transcription/translation of spoken language to printed text. The electronic translation of spoken  language may permit erroneous, or at times, nonsensical words or phrases to be inadvertently transcribed. Although I have reviewed the note for such errors, some may still exist.

## 2018-07-12 ENCOUNTER — OFFICE VISIT (OUTPATIENT)
Dept: ORTHOPEDIC SURGERY | Facility: CLINIC | Age: 33
End: 2018-07-12

## 2018-07-12 VITALS — WEIGHT: 233 LBS | HEIGHT: 72 IN | BODY MASS INDEX: 31.56 KG/M2

## 2018-07-12 DIAGNOSIS — M75.41 IMPINGEMENT SYNDROME OF RIGHT SHOULDER: ICD-10-CM
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
## 2018-07-12 DIAGNOSIS — M75.111 INCOMPLETE TEAR OF RIGHT ROTATOR CUFF: Primary | ICD-10-CM

## 2018-07-12 DIAGNOSIS — M25.511 ACUTE PAIN OF RIGHT SHOULDER: ICD-10-CM

## 2018-07-12 PROCEDURE — 99214 OFFICE O/P EST MOD 30 MIN: CPT | Performed by: ORTHOPAEDIC SURGERY

## 2018-07-12 PROCEDURE — 99080 SPECIAL REPORTS OR FORMS: CPT | Performed by: ORTHOPAEDIC SURGERY

## 2018-07-12 NOTE — PROGRESS NOTES
Marko Jaquez is a 32 y.o. male returns for     Chief Complaint   Patient presents with   • Right Shoulder - Follow-up       HISTORY OF PRESENT ILLNESS: Patient being seen for right shoulder follow up. Patient is complaining of increased pain. Patient is doing work conditioning daily at [employer].   His initial injury was November 15, 2017.  We have tried efforts at nonoperative management.  He has had an evaluation by neurosurgery who felt that there was no cervical spine issue contributing to the function of his shoulder.  He continues to have pain with activities of daily living and continues to have weakness at and above shoulder level.  He has been unable to return to full duty.       CONCURRENT MEDICAL HISTORY:    Past Medical History:   Diagnosis Date   • Acute bronchitis    • Acute pharyngitis    • Backache    • Chronic low back pain    • Dysfunction of eustachian tube    • Exanthematous disorder    • Generalized abdominal pain    • Headache    • Health examination of defined sub-group    • Heat exhaustion    • Influenza- like illness    • Intervertebral disc prolapse    • Low back pain     with radiculopathy   • Malaise and fatigue    • Pain in elbow    • Pneumonia    • Sciatica    • Skin sensation disturbance        Allergies   Allergen Reactions   • Lortab [Hydrocodone-Acetaminophen] Itching and Other (See Comments)     Completely itchy all over and heightens the pain doesnt decrease it feels like he is going to jump out of skin         Current Outpatient Prescriptions:   •  baclofen (LIORESAL) 10 MG tablet, TAKE 1 TABLET BY MOUTH 2 (TWO) TIMES A DAY AS NEEDED FOR MUSCLE SPASMS., Disp: 90 tablet, Rfl: 1  •  buPROPion XL (WELLBUTRIN XL) 150 MG 24 hr tablet, Take 1 tablet by mouth Daily., Disp: 30 tablet, Rfl: 5  •  busPIRone (BUSPAR) 5 MG tablet, Take 1 tablet by mouth 3 (Three) Times a Day., Disp: 90 tablet, Rfl: 5  •  Calcium Carb-Cholecalciferol (CALCIUM-VITAMIN D) 500-200 MG-UNIT per tablet, Take 1 tablet  "by mouth 2 (Two) Times a Day With Meals. For Vit D/Calcium supplementation, Disp: 60 tablet, Rfl: 11  •  Esomeprazole Magnesium (NEXIUM 24HR PO), Take  by mouth., Disp: , Rfl:   •  lidocaine (XYLOCAINE) 5 % ointment, Apply  topically 2 (Two) Times a Day As Needed (hemorrhoid pain)., Disp: 30 g, Rfl: 3  •  meloxicam (MOBIC) 15 MG tablet, Take 1 tablet by mouth Daily., Disp: 30 tablet, Rfl: 5  •  pramoxine-mineral oil-zinc (TUCKS) 1-12.5 % ointment rectal ointment, Insert 1 application into the rectum Every 4 (Four) Hours As Needed for Hemorrhoids., Disp: 28.3 each, Rfl: 3    Current Facility-Administered Medications:   •  Testosterone Cypionate (DEPOTESTOTERONE CYPIONATE) injection 200 mg, 200 mg, Intramuscular, Q30 Days, Olimpia Reid MD, 200 mg at 05/03/18 1123    Past Surgical History:   Procedure Laterality Date   • INJECTION OF MEDICATION      Depo Medrol (80mg)   • INJECTION OF MEDICATION      Dexamethasone (5mg)   • INJECTION OF MEDICATION      Kenalog (40mg)   • INJECTION OF MEDICATION      Rocephin (1 gm)   • INJECTION OF MEDICATION      Toradol (60mg)     History reviewed. No pertinent family history.     Social History   Substance Use Topics   • Smoking status: Current Every Day Smoker     Packs/day: 1.00     Types: Cigarettes   • Smokeless tobacco: Never Used   • Alcohol use Yes      Comment: occasional use         ROS  No fevers or chills.  No chest pain or shortness of air.  No GI or  disturbances.  Other than shoulder pain all other systems reviewed as negative.    PHYSICAL EXAMINATION:       Ht 182.9 cm (72\")   Wt 106 kg (233 lb)   BMI 31.60 kg/m²     Physical Exam   Constitutional: He is oriented to person, place, and time. He appears well-developed and well-nourished. No distress.   Cardiovascular: Normal rate, regular rhythm and normal heart sounds.    Pulmonary/Chest: Effort normal and breath sounds normal.   Abdominal: Soft. Bowel sounds are normal.   Neurological: He is alert and oriented " to person, place, and time.   Psychiatric: He has a normal mood and affect. His behavior is normal. Judgment and thought content normal.   Vitals reviewed.      GAIT:     [x]  Normal  []  Antalgic    Assistive device: [x]  None  []  Walker     []  Crutches  []  Cane     []  Wheelchair  []  Stretcher    Right Shoulder Exam     Tenderness   The patient is experiencing tenderness in the acromioclavicular joint.    Range of Motion   Active Abduction: 150 (180° with assistance)   Forward Flexion: 120 (150° with assistance)     Muscle Strength   Abduction: 5/5   Supraspinatus: 5/5     Tests   Cross Arm: positive  Hawkin's test: positive  Impingement: positive    Other   Erythema: absent  Sensation: normal  Pulse: present    Comments:  Positive empty can test.      Left Shoulder Exam     Tenderness   The patient is experiencing no tenderness.         Range of Motion   The patient has normal left shoulder ROM.    Muscle Strength   The patient has normal left shoulder strength.    Tests   Hawkin's test: negative    Other   Erythema: absent  Sensation: normal  Pulse: present                 Three-view cervical spine     HISTORY: Neck pain, right-sided.     AP, odontoid and lateral views of the cervical spine were  obtained.     Straightening of the cervical lordosis.  Vertebral height, disc spaces and alignment are maintained.  No fracture.     Prevertebral soft tissues unremarkable.     IMPRESSION:  CONCLUSION:  Straightening of the cervical lordosis.     165[address]     Electronically signed by:  Naseem Galvan MD  3/5/2018 3:44 PM CST  Workstation: [id number]    MRI Right Shoulder 12/21/17            ASSESSMENT:    Diagnoses and all orders for this visit:    Incomplete tear of right rotator cuff  -     Case Request; Standing  -     ceFAZolin (ANCEF) 2 g in sodium chloride 0.9 % 100 mL IVPB; Infuse 2 g into a venous catheter 1 (One) Time.  -     Case Request    Acute pain of right shoulder  -     Case Request; Standing  -      ceFAZolin (ANCEF) 2 g in sodium chloride 0.9 % 100 mL IVPB; Infuse 2 g into a venous catheter 1 (One) Time.  -     Case Request    Work related injury  -     Case Request; Standing  -     ceFAZolin (ANCEF) 2 g in sodium chloride 0.9 % 100 mL IVPB; Infuse 2 g into a venous catheter 1 (One) Time.  -     Case Request    Impingement syndrome of right shoulder  -     Case Request; Standing  -     ceFAZolin (ANCEF) 2 g in sodium chloride 0.9 % 100 mL IVPB; Infuse 2 g into a venous catheter 1 (One) Time.  -     Case Request    Other orders  -     Follow Anesthesia Guidelines / Standing Orders; Future  -     Provide instructions to patient regarding NPO status  -     Follow Anesthesia Guidelines / Standing Orders; Standing  -     Verify NPO Status; Standing  -     POC Glucose Once; Standing  -     Clip operative site; Standing  -     Obtain informed consent (if not collected inpatient or PAT); Standing  -     NPO After Midnight  -     Nerve Block; Standing        Discussion with  was held with the patient in the room.  We discussed work activity, progression, further treatment options, and return to work.  All questions were answered.      PLAN    We have tried multiple means of nonoperative management and he has been unable to progress.  He is still unable to return to work.      He does have some intermittent numbness and tingling which is difficult to explain other than probable muscle spasm related.    With review of his MRI he has a partial-thickness tear of the rotator cuff.  This is been unable to heal with nonoperative management.  Plan would be to proceed with arthroscopy of the shoulder to include subacromial decompression, resection of the distal clavicle, and evaluation and possible repair of the rotator cuff.    The MRI shows significant acromioclavicular joint arthritic changes with impingement on the supraspinatus tendon.  Although I am well aware that his work injury did not cause the  acromioclavicular joint arthritis, if it is not addressed at the time of surgery then he has a high likelihood of continuing to have pain, weakness, and potentially even re-tear due to the continued impingement from the acromioclavicular joint.  Because his arthritis preexisted his work injury I would not include resection of the acromioclavicular joint in his resultant impairment when he gets to [facility].  I do think it is paramount to address at the time of the surgery.    The patient voiced understanding of the risks, benefits, and alternative forms of treatment that were discussed and the patient consents to proceed with surgery.  All risks, benefits and alternatives were discussed.  Risks including to but not exclusive to anesthetic complications, including death, MI, CVA, infection, bleeding DVT, fracture, residual pain and need for future surgery.  This discussion was held with the patient by David Miller MD and all questions were answered.      Patient's Body mass index is 31.6 kg/m². BMI is above normal parameters. Recommendations include: exercise counseling and nutrition counseling.      Return for Post-operative eval.    David Miller MD

## 2018-07-14 RX ORDER — BUPIVACAINE HCL/0.9 % NACL/PF 0.1 %
2 PLASTIC BAG, INJECTION (ML) EPIDURAL ONCE
Status: CANCELLED | OUTPATIENT
Start: 2018-08-10 | End: 2018-08-10

## 2018-07-20 ENCOUNTER — TELEPHONE (OUTPATIENT)
Dept: ORTHOPEDIC SURGERY | Facility: CLINIC | Age: 33
End: 2018-07-20

## 2018-08-06 ENCOUNTER — APPOINTMENT (OUTPATIENT)
Dept: PREADMISSION TESTING | Facility: HOSPITAL | Age: 33
End: 2018-08-06

## 2018-08-06 VITALS
BODY MASS INDEX: 31.28 KG/M2 | RESPIRATION RATE: 12 BRPM | HEIGHT: 73 IN | SYSTOLIC BLOOD PRESSURE: 120 MMHG | WEIGHT: 236 LBS | HEART RATE: 72 BPM | OXYGEN SATURATION: 97 % | DIASTOLIC BLOOD PRESSURE: 72 MMHG

## 2018-08-06 RX ORDER — SODIUM CHLORIDE, SODIUM GLUCONATE, SODIUM ACETATE, POTASSIUM CHLORIDE, AND MAGNESIUM CHLORIDE 526; 502; 368; 37; 30 MG/100ML; MG/100ML; MG/100ML; MG/100ML; MG/100ML
1000 INJECTION, SOLUTION INTRAVENOUS CONTINUOUS
Status: CANCELLED | OUTPATIENT
Start: 2018-08-10

## 2018-08-06 RX ORDER — BACLOFEN 10 MG/1
10 TABLET ORAL 2 TIMES DAILY PRN
COMMUNITY
End: 2018-08-24

## 2018-08-10 ENCOUNTER — ANESTHESIA (OUTPATIENT)
Dept: PERIOP | Facility: HOSPITAL | Age: 33
End: 2018-08-10

## 2018-08-10 ENCOUNTER — ANESTHESIA EVENT (OUTPATIENT)
Dept: PERIOP | Facility: HOSPITAL | Age: 33
End: 2018-08-10

## 2018-08-10 ENCOUNTER — HOSPITAL ENCOUNTER (OUTPATIENT)
Facility: HOSPITAL | Age: 33
Setting detail: HOSPITAL OUTPATIENT SURGERY
Discharge: HOME | End: 2018-08-10
Attending: ORTHOPAEDIC SURGERY | Admitting: ORTHOPAEDIC SURGERY

## 2018-08-10 VITALS
BODY MASS INDEX: 31.53 KG/M2 | HEIGHT: 73 IN | OXYGEN SATURATION: 99 % | HEART RATE: 76 BPM | RESPIRATION RATE: 18 BRPM | SYSTOLIC BLOOD PRESSURE: 143 MMHG | DIASTOLIC BLOOD PRESSURE: 95 MMHG | WEIGHT: 237.88 LBS | TEMPERATURE: 97 F

## 2018-08-10 DIAGNOSIS — M75.41 IMPINGEMENT SYNDROME OF RIGHT SHOULDER: Primary | ICD-10-CM

## 2018-08-10 DIAGNOSIS — M25.511 ACUTE PAIN OF RIGHT SHOULDER: ICD-10-CM
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
## 2018-08-10 DIAGNOSIS — M75.111 INCOMPLETE TEAR OF RIGHT ROTATOR CUFF: ICD-10-CM

## 2018-08-10 PROCEDURE — 25010000002 MIDAZOLAM PER 1 MG: Performed by: NURSE ANESTHETIST, CERTIFIED REGISTERED

## 2018-08-10 PROCEDURE — 25010000002 EPINEPHRINE PER 0.1 MG: Performed by: ORTHOPAEDIC SURGERY

## 2018-08-10 PROCEDURE — 25010000002 FENTANYL CITRATE (PF) 100 MCG/2ML SOLUTION: Performed by: NURSE ANESTHETIST, CERTIFIED REGISTERED

## 2018-08-10 PROCEDURE — 29826 SHO ARTHRS SRG DECOMPRESSION: CPT | Performed by: ORTHOPAEDIC SURGERY

## 2018-08-10 PROCEDURE — 29824 SHO ARTHRS SRG DSTL CLAVICLC: CPT | Performed by: ORTHOPAEDIC SURGERY

## 2018-08-10 PROCEDURE — 25010000002 PROPOFOL 10 MG/ML EMULSION: Performed by: NURSE ANESTHETIST, CERTIFIED REGISTERED

## 2018-08-10 PROCEDURE — 25010000002 SUCCINYLCHOLINE PER 20 MG: Performed by: NURSE ANESTHETIST, CERTIFIED REGISTERED

## 2018-08-10 RX ORDER — NALOXONE HCL 0.4 MG/ML
0.2 VIAL (ML) INJECTION AS NEEDED
Status: DISCONTINUED | OUTPATIENT
Start: 2018-08-10 | End: 2018-08-10 | Stop reason: HOSPADM

## 2018-08-10 RX ORDER — ROCURONIUM BROMIDE 10 MG/ML
INJECTION, SOLUTION INTRAVENOUS AS NEEDED
Status: DISCONTINUED | OUTPATIENT
Start: 2018-08-10 | End: 2018-08-10 | Stop reason: SURG

## 2018-08-10 RX ORDER — MEPERIDINE HYDROCHLORIDE 50 MG/ML
12.5 INJECTION INTRAMUSCULAR; INTRAVENOUS; SUBCUTANEOUS
Status: DISCONTINUED | OUTPATIENT
Start: 2018-08-10 | End: 2018-08-10 | Stop reason: HOSPADM

## 2018-08-10 RX ORDER — DIPHENHYDRAMINE HYDROCHLORIDE 50 MG/ML
12.5 INJECTION INTRAMUSCULAR; INTRAVENOUS
Status: DISCONTINUED | OUTPATIENT
Start: 2018-08-10 | End: 2018-08-10 | Stop reason: HOSPADM

## 2018-08-10 RX ORDER — OXYCODONE AND ACETAMINOPHEN 7.5; 325 MG/1; MG/1
1 TABLET ORAL ONCE AS NEEDED
Status: DISCONTINUED | OUTPATIENT
Start: 2018-08-10 | End: 2018-08-10 | Stop reason: HOSPADM

## 2018-08-10 RX ORDER — OXYCODONE AND ACETAMINOPHEN 7.5; 325 MG/1; MG/1
1 TABLET ORAL EVERY 4 HOURS PRN
Qty: 40 TABLET | Refills: 0 | Status: SHIPPED | OUTPATIENT
Start: 2018-08-10 | End: 2018-08-21 | Stop reason: SDUPTHER

## 2018-08-10 RX ORDER — LABETALOL HYDROCHLORIDE 5 MG/ML
5 INJECTION, SOLUTION INTRAVENOUS
Status: DISCONTINUED | OUTPATIENT
Start: 2018-08-10 | End: 2018-08-10 | Stop reason: HOSPADM

## 2018-08-10 RX ORDER — PROMETHAZINE HYDROCHLORIDE 25 MG/ML
12.5 INJECTION, SOLUTION INTRAMUSCULAR; INTRAVENOUS ONCE AS NEEDED
Status: DISCONTINUED | OUTPATIENT
Start: 2018-08-10 | End: 2018-08-10 | Stop reason: HOSPADM

## 2018-08-10 RX ORDER — EPHEDRINE SULFATE 50 MG/ML
5 INJECTION, SOLUTION INTRAVENOUS ONCE AS NEEDED
Status: DISCONTINUED | OUTPATIENT
Start: 2018-08-10 | End: 2018-08-10 | Stop reason: HOSPADM

## 2018-08-10 RX ORDER — EPINEPHRINE 1 MG/ML
INJECTION, SOLUTION, CONCENTRATE INTRAVENOUS AS NEEDED
Status: DISCONTINUED | OUTPATIENT
Start: 2018-08-10 | End: 2018-08-10 | Stop reason: HOSPADM

## 2018-08-10 RX ORDER — ACETAMINOPHEN 325 MG/1
650 TABLET ORAL ONCE AS NEEDED
Status: DISCONTINUED | OUTPATIENT
Start: 2018-08-10 | End: 2018-08-10 | Stop reason: HOSPADM

## 2018-08-10 RX ORDER — PROPOFOL 10 MG/ML
VIAL (ML) INTRAVENOUS AS NEEDED
Status: DISCONTINUED | OUTPATIENT
Start: 2018-08-10 | End: 2018-08-10 | Stop reason: SURG

## 2018-08-10 RX ORDER — ONDANSETRON 2 MG/ML
4 INJECTION INTRAMUSCULAR; INTRAVENOUS ONCE AS NEEDED
Status: DISCONTINUED | OUTPATIENT
Start: 2018-08-10 | End: 2018-08-10 | Stop reason: HOSPADM

## 2018-08-10 RX ORDER — LIDOCAINE HYDROCHLORIDE 20 MG/ML
INJECTION, SOLUTION INFILTRATION; PERINEURAL AS NEEDED
Status: DISCONTINUED | OUTPATIENT
Start: 2018-08-10 | End: 2018-08-10 | Stop reason: SURG

## 2018-08-10 RX ORDER — BUPIVACAINE HCL/0.9 % NACL/PF 0.1 %
2 PLASTIC BAG, INJECTION (ML) EPIDURAL ONCE
Status: COMPLETED | OUTPATIENT
Start: 2018-08-10 | End: 2018-08-10

## 2018-08-10 RX ORDER — ACETAMINOPHEN 650 MG/1
650 SUPPOSITORY RECTAL ONCE AS NEEDED
Status: DISCONTINUED | OUTPATIENT
Start: 2018-08-10 | End: 2018-08-10 | Stop reason: HOSPADM

## 2018-08-10 RX ORDER — FENTANYL CITRATE 50 UG/ML
INJECTION, SOLUTION INTRAMUSCULAR; INTRAVENOUS AS NEEDED
Status: DISCONTINUED | OUTPATIENT
Start: 2018-08-10 | End: 2018-08-10 | Stop reason: SURG

## 2018-08-10 RX ORDER — SODIUM CHLORIDE, SODIUM GLUCONATE, SODIUM ACETATE, POTASSIUM CHLORIDE, AND MAGNESIUM CHLORIDE 526; 502; 368; 37; 30 MG/100ML; MG/100ML; MG/100ML; MG/100ML; MG/100ML
1000 INJECTION, SOLUTION INTRAVENOUS CONTINUOUS
Status: DISCONTINUED | OUTPATIENT
Start: 2018-08-10 | End: 2018-08-10 | Stop reason: HOSPADM

## 2018-08-10 RX ORDER — FLUMAZENIL 0.1 MG/ML
0.2 INJECTION INTRAVENOUS AS NEEDED
Status: DISCONTINUED | OUTPATIENT
Start: 2018-08-10 | End: 2018-08-10 | Stop reason: HOSPADM

## 2018-08-10 RX ORDER — SUCCINYLCHOLINE CHLORIDE 20 MG/ML
INJECTION INTRAMUSCULAR; INTRAVENOUS AS NEEDED
Status: DISCONTINUED | OUTPATIENT
Start: 2018-08-10 | End: 2018-08-10 | Stop reason: SURG

## 2018-08-10 RX ORDER — MIDAZOLAM HYDROCHLORIDE 1 MG/ML
INJECTION INTRAMUSCULAR; INTRAVENOUS AS NEEDED
Status: DISCONTINUED | OUTPATIENT
Start: 2018-08-10 | End: 2018-08-10 | Stop reason: SURG

## 2018-08-10 RX ADMIN — Medication 2 G: at 13:45

## 2018-08-10 RX ADMIN — ROCURONIUM BROMIDE 5 MG: 10 INJECTION INTRAVENOUS at 13:28

## 2018-08-10 RX ADMIN — PROPOFOL 200 MG: 10 INJECTION, EMULSION INTRAVENOUS at 13:28

## 2018-08-10 RX ADMIN — SUCCINYLCHOLINE CHLORIDE 140 MG: 20 INJECTION, SOLUTION INTRAMUSCULAR; INTRAVENOUS at 13:28

## 2018-08-10 RX ADMIN — LIDOCAINE HYDROCHLORIDE 50 MG: 20 INJECTION, SOLUTION INFILTRATION; PERINEURAL at 13:28

## 2018-08-10 RX ADMIN — FENTANYL CITRATE 50 MCG: 50 INJECTION, SOLUTION INTRAMUSCULAR; INTRAVENOUS at 13:28

## 2018-08-10 RX ADMIN — SODIUM CHLORIDE, SODIUM GLUCONATE, SODIUM ACETATE, POTASSIUM CHLORIDE, AND MAGNESIUM CHLORIDE: 526; 502; 368; 37; 30 INJECTION, SOLUTION INTRAVENOUS at 13:13

## 2018-08-10 RX ADMIN — MIDAZOLAM 2 MG: 1 INJECTION INTRAMUSCULAR; INTRAVENOUS at 13:13

## 2018-08-10 RX ADMIN — FENTANYL CITRATE 50 MCG: 50 INJECTION, SOLUTION INTRAMUSCULAR; INTRAVENOUS at 13:20

## 2018-08-10 NOTE — ANESTHESIA PROCEDURE NOTES
Airway  Urgency: elective    Airway not difficult    General Information and Staff    Patient location during procedure: OR  CRNA: ADRIEL GARCIA    Indications and Patient Condition  Indications for airway management: airway protection    Preoxygenated: yes  MILS maintained throughout  Mask difficulty assessment: 1 - vent by mask    Final Airway Details  Final airway type: endotracheal airway      Successful airway: ETT    Successful intubation technique: direct laryngoscopy  Endotracheal tube insertion site: oral  Blade: Cathryn  Blade size: #4  ETT size: 7.5 mm  Cormack-Lehane Classification: grade IIa - partial view of glottis  Placement verified by: chest auscultation and capnometry   Number of attempts at approach: 1

## 2018-08-10 NOTE — OP NOTE
NAME: Marko Jaquez     YOB: 1985    DATE OF SURGERY: 8/10/2018    PREOPERATIVE DIAGNOSIS:  Impingement syndrome of right shoulder [M75.41]  Work related injury [Y99.0]  Incomplete tear of right rotator cuff [M75.111]  Acute pain of right shoulder [M25.511]    POSTOPERATIVE DIAGNOSIS:  Post-Op Diagnosis Codes:     * Impingement syndrome of right shoulder [M75.41]     * Work related injury [Y99.0]     * Incomplete tear of right rotator cuff [M75.111]     * Acute pain of right shoulder [M25.511]    PROCEDURE PERFORMED:      Arthroscopy of the Right shoulder with   1.  DANE procedure   2.  Sub-acromial decompression    SURGEON:  David Miller MD    Assistant:  MIGUEL ANGEL Stokes    Staff:    Circulator: Mandy Humphrey RN; Crissy Jones RN  Scrub Person: Kelley Hoover; Delma Abel  Assistant: Shari Anthony CSA    Anesthesia:  General with Block      Estimated Blood Loss:  minimal    Specimens : None    Complications: none    Implants:  Nothing was implanted during the procedure    DESCRIPTION OF PROCEDURE:   Once consent was obtained, the patient was taken to the operating. Once adequate anesthesia was obtained, then the patient was rolled in the lateral decubitus position, Right side up. All bony prominences were well padded, and an axillary roll was placed. The Right upper extremity was then manipulated through a full range of  motion without any difficulty. The Right upper extremity was then  prepped and draped in the standard surgical fashion. The Right arm was  then placed in the arthroscopic arm mendez. The standard posterior  portal was made, and the diagnostic portion of the arthroscopy began.  The intra-articular portion of the exam showed that the articular  surface was in good condition without any sign of articular cartilage wear.. The biceps tendon showed a firm attachment onto the glenoid labrum. The rotator cuff was visualized and found to be intact from the  articular surface.  No fraying was encountered. There were no loose bodies in the intra-articular space or in the subacromial recess.      The subacromial space was then entered and there was noted to be a large  subacromial spur on the anterior aspect. There also was noted to be  significant inflammatory bursitis, which was resected using electrocautery and the  suction shaver.  There was noted to be significant impingement with very little space in the subacromial area.  Even with the arthroscopy pump fluid there was hardly room for the trocar between the acromion and the superior surface of the rotator cuff.  Once the acromion and the clavicle were clearly  identified, then the adwoa was used to resect the subacromial spur and to  turn the acromion into a type 1 acromion. This was done after having  established a lateral portal using an 18-gauge spinal needle for  localization and a small stab wound incision.      At this point, the anterior portal was also established using an 18-  gauge spinal needle for localization and a small stab wound incision.  The cannula was inserted and the distal clavicle resection was performed  in the standard fashion resecting approximately 8-10 mm of bone.      The attention was returned to the rotator cuff which was found to be intact.  There was some mild bursal sided fraying but no significant partial thickness tear was identified.  The arm was internally next early rotated to confirm the insertion of the cuff was intact.    The shaver was then allowed to run on suction to remove any  remaining loose fragments. The arthroscopy was then terminated. The  wounds were closed with interrupted nylon suture, covered with Xeroform  gauze, 4x4's, and Elastoplast dressing. The patient was then awakened  and taken to the recovery room in good condition. He tolerated the  procedure very well.      David Miller MD     Date: 8/10/2018  Time: 3:22 PM

## 2018-08-10 NOTE — ANESTHESIA POSTPROCEDURE EVALUATION
Patient: Marko Jaquez    Procedure Summary     Date:  08/10/18 Room / Location:  [facility] OR  / [facility] OR    Anesthesia Start:  1313 Anesthesia Stop:  1526    Procedure:  SHOULDER ARTHROSCOPY, DANE procedure, and Subacromial decompression (Right Shoulder) Diagnosis:       Impingement syndrome of right shoulder      Work related injury      Incomplete tear of right rotator cuff      Acute pain of right shoulder      (Impingement syndrome of right shoulder [M75.41])      (Work related injury [Y99.0])      (Incomplete tear of right rotator cuff [M75.111])      (Acute pain of right shoulder [M25.511])    Surgeon:  David Miller MD Provider:  Van Edmonds MD    Anesthesia Type:  general ASA Status:  2          Anesthesia Type: general  Last vitals  BP   138/74 (08/10/18 1511)   Temp   97 °F (36.1 °C) (08/10/18 1511)   Pulse   77 (08/10/18 1511)   Resp   18 (08/10/18 1511)     SpO2   98 % (08/10/18 1216)     Post Anesthesia Care and Evaluation    Patient location during evaluation: PACU  Patient participation: complete - patient participated  Level of consciousness: awake  Pain score: 0  Pain management: adequate  Airway patency: patent  Anesthetic complications: No anesthetic complications  PONV Status: none  Cardiovascular status: acceptable  Respiratory status: acceptable  Hydration status: acceptable

## 2018-08-10 NOTE — H&P (VIEW-ONLY)
Marko Jaquez is a 32 y.o. male returns for     Chief Complaint   Patient presents with   • Right Shoulder - Follow-up       HISTORY OF PRESENT ILLNESS: Patient being seen for right shoulder follow up. Patient is complaining of increased pain. Patient is doing work conditioning daily at [facility].   His initial injury was November 15, 2017.  We have tried efforts at nonoperative management.  He has had an evaluation by neurosurgery who felt that there was no cervical spine issue contributing to the function of his shoulder.  He continues to have pain with activities of daily living and continues to have weakness at and above shoulder level.  He has been unable to return to full duty.       CONCURRENT MEDICAL HISTORY:    Past Medical History:   Diagnosis Date   • Acute bronchitis    • Acute pharyngitis    • Backache    • Chronic low back pain    • Dysfunction of eustachian tube    • Exanthematous disorder    • Generalized abdominal pain    • Headache    • Health examination of defined sub-group    • Heat exhaustion    • Influenza- like illness    • Intervertebral disc prolapse    • Low back pain     with radiculopathy   • Malaise and fatigue    • Pain in elbow    • Pneumonia    • Sciatica    • Skin sensation disturbance        Allergies   Allergen Reactions   • Lortab [Hydrocodone-Acetaminophen] Itching and Other (See Comments)     Completely itchy all over and heightens the pain doesnt decrease it feels like he is going to jump out of skin         Current Outpatient Prescriptions:   •  baclofen (LIORESAL) 10 MG tablet, TAKE 1 TABLET BY MOUTH 2 (TWO) TIMES A DAY AS NEEDED FOR MUSCLE SPASMS., Disp: 90 tablet, Rfl: 1  •  buPROPion XL (WELLBUTRIN XL) 150 MG 24 hr tablet, Take 1 tablet by mouth Daily., Disp: 30 tablet, Rfl: 5  •  busPIRone (BUSPAR) 5 MG tablet, Take 1 tablet by mouth 3 (Three) Times a Day., Disp: 90 tablet, Rfl: 5  •  Calcium Carb-Cholecalciferol (CALCIUM-VITAMIN D) 500-200 MG-UNIT per tablet, Take 1 tablet  "by mouth 2 (Two) Times a Day With Meals. For Vit D/Calcium supplementation, Disp: 60 tablet, Rfl: 11  •  Esomeprazole Magnesium (NEXIUM 24HR PO), Take  by mouth., Disp: , Rfl:   •  lidocaine (XYLOCAINE) 5 % ointment, Apply  topically 2 (Two) Times a Day As Needed (hemorrhoid pain)., Disp: 30 g, Rfl: 3  •  meloxicam (MOBIC) 15 MG tablet, Take 1 tablet by mouth Daily., Disp: 30 tablet, Rfl: 5  •  pramoxine-mineral oil-zinc (TUCKS) 1-12.5 % ointment rectal ointment, Insert 1 application into the rectum Every 4 (Four) Hours As Needed for Hemorrhoids., Disp: 28.3 each, Rfl: 3    Current Facility-Administered Medications:   •  Testosterone Cypionate (DEPOTESTOTERONE CYPIONATE) injection 200 mg, 200 mg, Intramuscular, Q30 Days, Olimpia Reid MD, 200 mg at 05/03/18 1123    Past Surgical History:   Procedure Laterality Date   • INJECTION OF MEDICATION      Depo Medrol (80mg)   • INJECTION OF MEDICATION      Dexamethasone (5mg)   • INJECTION OF MEDICATION      Kenalog (40mg)   • INJECTION OF MEDICATION      Rocephin (1 gm)   • INJECTION OF MEDICATION      Toradol (60mg)     History reviewed. No pertinent family history.     Social History   Substance Use Topics   • Smoking status: Current Every Day Smoker     Packs/day: 1.00     Types: Cigarettes   • Smokeless tobacco: Never Used   • Alcohol use Yes      Comment: occasional use         ROS  No fevers or chills.  No chest pain or shortness of air.  No GI or  disturbances.  Other than shoulder pain all other systems reviewed as negative.    PHYSICAL EXAMINATION:       Ht 182.9 cm (72\")   Wt 106 kg (233 lb)   BMI 31.60 kg/m²     Physical Exam   Constitutional: He is oriented to person, place, and time. He appears well-developed and well-nourished. No distress.   Cardiovascular: Normal rate, regular rhythm and normal heart sounds.    Pulmonary/Chest: Effort normal and breath sounds normal.   Abdominal: Soft. Bowel sounds are normal.   Neurological: He is alert and oriented " to person, place, and time.   Psychiatric: He has a normal mood and affect. His behavior is normal. Judgment and thought content normal.   Vitals reviewed.      GAIT:     [x]  Normal  []  Antalgic    Assistive device: [x]  None  []  Walker     []  Crutches  []  Cane     []  Wheelchair  []  Stretcher    Right Shoulder Exam     Tenderness   The patient is experiencing tenderness in the acromioclavicular joint.    Range of Motion   Active Abduction: 150 (180° with assistance)   Forward Flexion: 120 (150° with assistance)     Muscle Strength   Abduction: 5/5   Supraspinatus: 5/5     Tests   Cross Arm: positive  Hawkin's test: positive  Impingement: positive    Other   Erythema: absent  Sensation: normal  Pulse: present    Comments:  Positive empty can test.      Left Shoulder Exam     Tenderness   The patient is experiencing no tenderness.         Range of Motion   The patient has normal left shoulder ROM.    Muscle Strength   The patient has normal left shoulder strength.    Tests   Hawkin's test: negative    Other   Erythema: absent  Sensation: normal  Pulse: present                 Three-view cervical spine     HISTORY: Neck pain, right-sided.     AP, odontoid and lateral views of the cervical spine were  obtained.     Straightening of the cervical lordosis.  Vertebral height, disc spaces and alignment are maintained.  No fracture.     Prevertebral soft tissues unremarkable.     IMPRESSION:  CONCLUSION:  Straightening of the cervical lordosis.     [id number]     Electronically signed by:  Naseem Galvan MD  3/5/2018 3:44 PM CST  Workstation: [id number]    MRI Right Shoulder 12/21/17            ASSESSMENT:    Diagnoses and all orders for this visit:    Incomplete tear of right rotator cuff  -     Case Request; Standing  -     ceFAZolin (ANCEF) 2 g in sodium chloride 0.9 % 100 mL IVPB; Infuse 2 g into a venous catheter 1 (One) Time.  -     Case Request    Acute pain of right shoulder  -     Case Request; Standing  -      ceFAZolin (ANCEF) 2 g in sodium chloride 0.9 % 100 mL IVPB; Infuse 2 g into a venous catheter 1 (One) Time.  -     Case Request    Work related injury  -     Case Request; Standing  -     ceFAZolin (ANCEF) 2 g in sodium chloride 0.9 % 100 mL IVPB; Infuse 2 g into a venous catheter 1 (One) Time.  -     Case Request    Impingement syndrome of right shoulder  -     Case Request; Standing  -     ceFAZolin (ANCEF) 2 g in sodium chloride 0.9 % 100 mL IVPB; Infuse 2 g into a venous catheter 1 (One) Time.  -     Case Request    Other orders  -     Follow Anesthesia Guidelines / Standing Orders; Future  -     Provide instructions to patient regarding NPO status  -     Follow Anesthesia Guidelines / Standing Orders; Standing  -     Verify NPO Status; Standing  -     POC Glucose Once; Standing  -     Clip operative site; Standing  -     Obtain informed consent (if not collected inpatient or PAT); Standing  -     NPO After Midnight  -     Nerve Block; Standing        Discussion with  was held with the patient in the room.  We discussed work activity, progression, further treatment options, and return to work.  All questions were answered.      PLAN    We have tried multiple means of nonoperative management and he has been unable to progress.  He is still unable to return to work.      He does have some intermittent numbness and tingling which is difficult to explain other than probable muscle spasm related.    With review of his MRI he has a partial-thickness tear of the rotator cuff.  This is been unable to heal with nonoperative management.  Plan would be to proceed with arthroscopy of the shoulder to include subacromial decompression, resection of the distal clavicle, and evaluation and possible repair of the rotator cuff.    The MRI shows significant acromioclavicular joint arthritic changes with impingement on the supraspinatus tendon.  Although I am well aware that his work injury did not cause the  acromioclavicular joint arthritis, if it is not addressed at the time of surgery then he has a high likelihood of continuing to have pain, weakness, and potentially even re-tear due to the continued impingement from the acromioclavicular joint.  Because his arthritis preexisted his work injury I would not include resection of the acromioclavicular joint in his resultant impairment when he gets to [facility].  I do think it is paramount to address at the time of the surgery.    The patient voiced understanding of the risks, benefits, and alternative forms of treatment that were discussed and the patient consents to proceed with surgery.  All risks, benefits and alternatives were discussed.  Risks including to but not exclusive to anesthetic complications, including death, MI, CVA, infection, bleeding DVT, fracture, residual pain and need for future surgery.  This discussion was held with the patient by David Miller MD and all questions were answered.      Patient's Body mass index is 31.6 kg/m². BMI is above normal parameters. Recommendations include: exercise counseling and nutrition counseling.      Return for Post-operative eval.    David Miller MD

## 2018-08-10 NOTE — ANESTHESIA PREPROCEDURE EVALUATION
Anesthesia Evaluation     Patient summary reviewed   NPO Solid Status: > 8 hours  NPO Liquid Status: > 8 hours           Airway   Mallampati: II  TM distance: >3 FB  Neck ROM: full  No difficulty expected  Dental - normal exam     Pulmonary     breath sounds clear to auscultation  (+) pneumonia stable , a smoker Former,   Cardiovascular - normal exam  Exercise tolerance: good (4-7 METS)    (+) PVD,       Neuro/Psych  (+) headaches, numbness,     GI/Hepatic/Renal/Endo    (+)  GERD well controlled,      Musculoskeletal     (+) neck pain,   Abdominal    Substance History      OB/GYN          Other   (+) arthritis                     Anesthesia Plan    ASA 2     general   (GA discussed with the patient along with the IS block.)  intravenous induction   Anesthetic plan and risks discussed with patient.

## 2018-08-17 ENCOUNTER — DOCUMENTATION (OUTPATIENT)
Dept: PHYSICAL THERAPY | Facility: CLINIC | Age: 33
End: 2018-08-17

## 2018-08-17 NOTE — PROGRESS NOTES
Discharge Summary  Discharge Summary from Physical Therapy Report      Dates  PT visit: 1/9 thru 2/12  Number of Visits: 8/10 appointments    Discharge Status of Patient: Pt did not return after MD visit 2/13    Goals: Partially Met    Prognosis good    Comments : Pt was indicated for surgery consult    Date of Discharge 2/12/18        Marichuy Mccloud, PT  Physical Therapist

## 2018-08-21 ENCOUNTER — OFFICE VISIT (OUTPATIENT)
Dept: ORTHOPEDIC SURGERY | Facility: CLINIC | Age: 33
End: 2018-08-21

## 2018-08-21 VITALS — BODY MASS INDEX: 31.54 KG/M2 | WEIGHT: 238 LBS | HEIGHT: 73 IN

## 2018-08-21 DIAGNOSIS — M75.41 IMPINGEMENT SYNDROME OF RIGHT SHOULDER: ICD-10-CM

## 2018-08-21 DIAGNOSIS — M75.111 INCOMPLETE TEAR OF RIGHT ROTATOR CUFF: Primary | ICD-10-CM
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
## 2018-08-21 DIAGNOSIS — M25.511 ACUTE PAIN OF RIGHT SHOULDER: ICD-10-CM

## 2018-08-21 PROCEDURE — 99024 POSTOP FOLLOW-UP VISIT: CPT | Performed by: ORTHOPAEDIC SURGERY

## 2018-08-21 PROCEDURE — 99080 SPECIAL REPORTS OR FORMS: CPT | Performed by: ORTHOPAEDIC SURGERY

## 2018-08-21 RX ORDER — OXYCODONE AND ACETAMINOPHEN 7.5; 325 MG/1; MG/1
1 TABLET ORAL EVERY 8 HOURS PRN
Qty: 40 TABLET | Refills: 0 | Status: SHIPPED | OUTPATIENT
Start: 2018-08-21 | End: 2018-09-21 | Stop reason: ALTCHOICE

## 2018-08-21 NOTE — PROGRESS NOTES
Marko Jaquez is a 32 y.o. male is s/p       Chief Complaint   Patient presents with   • Right Shoulder - Post-op   • Suture / Staple Removal       HISTORY OF PRESENT ILLNESS: Arthroscopy of the Right shoulder with              1.  DANE procedure              2.  Sub-acromial decompression done on 8/10/2018. Physical therapy at [facility]. Patient requesting refill on percocet.        Allergies   Allergen Reactions   • Lortab [Hydrocodone-Acetaminophen] Nausea And Vomiting         Current Outpatient Prescriptions:   •  buPROPion XL (WELLBUTRIN XL) 150 MG 24 hr tablet, Take 1 tablet by mouth Daily., Disp: 30 tablet, Rfl: 5  •  busPIRone (BUSPAR) 5 MG tablet, Take 1 tablet by mouth 3 (Three) Times a Day., Disp: 90 tablet, Rfl: 5  •  Calcium Carb-Cholecalciferol (CALCIUM-VITAMIN D) 500-200 MG-UNIT per tablet, Take 1 tablet by mouth 2 (Two) Times a Day With Meals. For Vit D/Calcium supplementation, Disp: 60 tablet, Rfl: 11  •  meloxicam (MOBIC) 15 MG tablet, Take 1 tablet by mouth Daily., Disp: 30 tablet, Rfl: 5  •  oxyCODONE-acetaminophen (PERCOCET) 7.5-325 MG per tablet, Take 1 tablet by mouth Every 8 (Eight) Hours As Needed (Pain)., Disp: 40 tablet, Rfl: 0  •  baclofen (LIORESAL) 10 MG tablet, TAKE 1 TABLET BY MOUTH 2 (TWO) TIMES A DAY AS NEEDED FOR MUSCLE SPASMS., Disp: 90 tablet, Rfl: 1  •  Testosterone Cypionate (DEPO-TESTOSTERONE) 200 MG/ML injection, Inject 1 mL into the appropriate muscle as directed by prescriber Every 30 (Thirty) Days., Disp: 1 mL, Rfl: 2    Current Facility-Administered Medications:   •  Testosterone Cypionate (DEPOTESTOTERONE CYPIONATE) injection 200 mg, 200 mg, Intramuscular, Q30 Days, Olimpia Reid MD, 200 mg at 05/03/18 1123    No fevers or chills.  No nausea or vomiting.      PHYSICAL EXAMINATION:       Marko Jaquez is a 32 y.o. male    Patient is awake and alert, answers questions appropriately and is in no apparent distress.      Right Shoulder Exam     Tenderness    The patient is experiencing no tenderness.        Range of Motion   Passive Abduction: 150   Forward Flexion: 150 (Passive)     Other   Erythema: absent  Sensation: normal  Pulse: present    Comments:  Incisions are clean and dry, sutures are removed.                  Discussion with  was held with the patient in the room.  We discussed work activity, progression, further treatment options, and return to work.  All questions were answered.    ASSESSMENT:    Diagnoses and all orders for this visit:    Incomplete tear of right rotator cuff    Acute pain of right shoulder    Work related injury    Impingement syndrome of right shoulder    Other orders  -     oxyCODONE-acetaminophen (PERCOCET) 7.5-325 MG per tablet; Take 1 tablet by mouth Every 8 (Eight) Hours As Needed (Pain).          PLAN    He was given a refill of Percocet.  We discussed continuing meloxicam.  He is going to continue using the Seven compression as that does seem to help significantly with his discomfort.  He is not at [facility].  He is unable to work at this time and will continue to be off work.  MMI date is undetermined but will most likely be between 6 months and 1 year postop.    Return in about 4 weeks (around 9/18/2018) for recheck.    David Miller MD

## 2018-08-23 RX ORDER — TESTOSTERONE CYPIONATE 200 MG/ML
200 INJECTION, SOLUTION INTRAMUSCULAR
Qty: 1 ML | Refills: 2 | Status: SHIPPED | OUTPATIENT
Start: 2018-08-23 | End: 2021-01-08

## 2018-08-24 DIAGNOSIS — M54.2 NECK PAIN ON RIGHT SIDE: ICD-10-CM

## 2018-08-24 DIAGNOSIS — M25.511 ACUTE PAIN OF RIGHT SHOULDER: ICD-10-CM

## 2018-08-24 RX ORDER — BACLOFEN 10 MG/1
10 TABLET ORAL 2 TIMES DAILY PRN
Qty: 90 TABLET | Refills: 1 | Status: SHIPPED | OUTPATIENT
Start: 2018-08-24 | End: 2018-11-07 | Stop reason: SDUPTHER

## 2018-09-21 ENCOUNTER — OFFICE VISIT (OUTPATIENT)
Dept: ORTHOPEDIC SURGERY | Facility: CLINIC | Age: 33
End: 2018-09-21

## 2018-09-21 VITALS — WEIGHT: 235 LBS | HEIGHT: 73 IN | BODY MASS INDEX: 31.14 KG/M2

## 2018-09-21 DIAGNOSIS — M75.111 INCOMPLETE TEAR OF RIGHT ROTATOR CUFF: Primary | ICD-10-CM

## 2018-09-21 DIAGNOSIS — M75.41 IMPINGEMENT SYNDROME OF RIGHT SHOULDER: ICD-10-CM
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
## 2018-09-21 DIAGNOSIS — M25.511 ACUTE PAIN OF RIGHT SHOULDER: ICD-10-CM

## 2018-09-21 PROCEDURE — 99024 POSTOP FOLLOW-UP VISIT: CPT | Performed by: ORTHOPAEDIC SURGERY

## 2018-09-21 PROCEDURE — 99080 SPECIAL REPORTS OR FORMS: CPT | Performed by: ORTHOPAEDIC SURGERY

## 2018-09-21 RX ORDER — OXYCODONE HYDROCHLORIDE AND ACETAMINOPHEN 5; 325 MG/1; MG/1
1 TABLET ORAL EVERY 8 HOURS PRN
Qty: 30 TABLET | Refills: 0 | Status: SHIPPED | OUTPATIENT
Start: 2018-09-21 | End: 2018-11-06

## 2018-09-21 NOTE — PROGRESS NOTES
Marko Jaquez is a 32 y.o. male is s/p       Chief Complaint   Patient presents with   • Right Shoulder - Follow-up, Post-op       HISTORY OF PRESENT ILLNESS:  1 month f/u on rt shoulder post op, Workman's comp patient has no questions,   5 weeks postop.  He is slowly getting better.  He has difficulty in the mornings and late at night.  He does state that he is better than he was prior to surgery.  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         Allergies   Allergen Reactions   • Lortab [Hydrocodone-Acetaminophen] Nausea And Vomiting         Current Outpatient Prescriptions:   •  baclofen (LIORESAL) 10 MG tablet, TAKE 1 TABLET BY MOUTH 2 (TWO) TIMES A DAY AS NEEDED FOR MUSCLE SPASMS., Disp: 90 tablet, Rfl: 1  •  buPROPion XL (WELLBUTRIN XL) 150 MG 24 hr tablet, Take 1 tablet by mouth Daily., Disp: 30 tablet, Rfl: 5  •  busPIRone (BUSPAR) 5 MG tablet, Take 1 tablet by mouth 3 (Three) Times a Day., Disp: 90 tablet, Rfl: 5  •  Calcium Carb-Cholecalciferol (CALCIUM-VITAMIN D) 500-200 MG-UNIT per tablet, Take 1 tablet by mouth 2 (Two) Times a Day With Meals. For Vit D/Calcium supplementation, Disp: 60 tablet, Rfl: 11  •  meloxicam (MOBIC) 15 MG tablet, Take 1 tablet by mouth Daily., Disp: 30 tablet, Rfl: 5  •  Testosterone Cypionate (DEPO-TESTOSTERONE) 200 MG/ML injection, Inject 1 mL into the appropriate muscle as directed by prescriber Every 30 (Thirty) Days., Disp: 1 mL, Rfl: 2  •  oxyCODONE-acetaminophen (PERCOCET) 5-325 MG per tablet, Take 1 tablet by mouth Every 8 (Eight) Hours As Needed for Moderate Pain . 1-2 Oral Q4H PRN severe pain, Disp: 30 tablet, Rfl: 0    Current Facility-Administered Medications:   •  Testosterone Cypionate (DEPOTESTOTERONE CYPIONATE) injection 200 mg, 200 mg, Intramuscular, Q30 Days, Olimpia Reid MD, 200 mg at 05/03/18 1123    No fevers or chills.  No nausea or vomiting.      PHYSICAL EXAMINATION:       Marko Jaquez  is a 32 y.o. male    Patient is awake and alert, answers questions appropriately and is in no apparent distress.    GAIT:     [x]  Normal  []  Antalgic    Assistive device: [x]  None  []  Walker     []  Crutches  []  Cane     []  Wheelchair  []  Stretcher    Right Shoulder Exam     Tenderness   The patient is experiencing no tenderness.        Range of Motion   Active Abduction: 90 (160° with assistance)   Forward Flexion: 90 (160° with assistance)   Right shoulder internal rotation 0 degrees: Buttock.     Muscle Strength   Abduction: 4/5   Supraspinatus: 4/5     Tests   Hawkin's test: negative  Impingement: negative    Other   Erythema: absent  Sensation: normal  Pulse: present                Discussion with  was held with the patient in the room.  We discussed work activity, progression, further treatment options, and return to work.  All questions were answered.        ASSESSMENT:    Diagnoses and all orders for this visit:    Incomplete tear of right rotator cuff  -     Ambulatory Referral to Physical Therapy    Acute pain of right shoulder    Impingement syndrome of right shoulder    Work related injury    Other orders  -     oxyCODONE-acetaminophen (PERCOCET) 5-325 MG per tablet; Take 1 tablet by mouth Every 8 (Eight) Hours As Needed for Moderate Pain . 1-2 Oral Q4H PRN severe pain          PLAN    He will continue strengthening and conditioning exercises as tolerated.  Continue with physical therapy 2-3 times per week depending on how he is advancing.  He is not ready to return to work as there is no light duty at his facility.  He is not at [facility].  We discussed the possibility of an FCE if he is not making reasonable improvement at next visit.    Patient's Body mass index is 31 kg/m². BMI is above normal parameters. Recommendations include: exercise counseling and nutrition counseling.  Return in about 6 weeks (around 11/2/2018) for recheck.    David Miller MD

## 2018-10-02 DIAGNOSIS — M75.111 INCOMPLETE TEAR OF RIGHT ROTATOR CUFF: Primary | ICD-10-CM

## 2018-10-02 DIAGNOSIS — M25.511 ACUTE PAIN OF RIGHT SHOULDER: ICD-10-CM

## 2018-10-02 DIAGNOSIS — M75.41 IMPINGEMENT SYNDROME OF RIGHT SHOULDER: ICD-10-CM

## 2018-10-29 DIAGNOSIS — Z13.1 SCREENING FOR DIABETES MELLITUS: ICD-10-CM

## 2018-10-29 DIAGNOSIS — F32.A DEPRESSION, UNSPECIFIED DEPRESSION TYPE: ICD-10-CM

## 2018-10-29 DIAGNOSIS — M25.511 ACUTE PAIN OF RIGHT SHOULDER: ICD-10-CM

## 2018-10-29 DIAGNOSIS — Z13.220 SCREENING FOR LIPID DISORDERS: Primary | ICD-10-CM

## 2018-10-29 DIAGNOSIS — M54.2 NECK PAIN ON RIGHT SIDE: ICD-10-CM

## 2018-10-29 DIAGNOSIS — E55.9 VITAMIN D DEFICIENCY: ICD-10-CM

## 2018-10-29 DIAGNOSIS — Z13.0 SCREENING FOR DEFICIENCY ANEMIA: ICD-10-CM

## 2018-10-29 RX ORDER — BUSPIRONE HYDROCHLORIDE 5 MG/1
5 TABLET ORAL 3 TIMES DAILY
Qty: 90 TABLET | Refills: 0 | Status: SHIPPED | OUTPATIENT
Start: 2018-10-29 | End: 2018-11-07 | Stop reason: SDUPTHER

## 2018-10-29 RX ORDER — BUPROPION HYDROCHLORIDE 150 MG/1
150 TABLET ORAL DAILY
Qty: 30 TABLET | Refills: 0 | Status: SHIPPED | OUTPATIENT
Start: 2018-10-29 | End: 2018-11-07 | Stop reason: SDUPTHER

## 2018-10-29 RX ORDER — MELOXICAM 15 MG/1
15 TABLET ORAL DAILY
Qty: 30 TABLET | Refills: 0 | Status: SHIPPED | OUTPATIENT
Start: 2018-10-29 | End: 2018-11-07 | Stop reason: SDUPTHER

## 2018-10-29 RX ORDER — TESTOSTERONE CYPIONATE 200 MG/ML
200 INJECTION, SOLUTION INTRAMUSCULAR
Qty: 1 ML | Refills: 2 | OUTPATIENT
Start: 2018-10-29

## 2018-10-29 NOTE — TELEPHONE ENCOUNTER
"Informed patients wife \"Sandra\" and she will call back and make those appointments when she sees about his schedule.  "

## 2018-11-06 ENCOUNTER — OFFICE VISIT (OUTPATIENT)
Dept: ORTHOPEDIC SURGERY | Facility: CLINIC | Age: 33
End: 2018-11-06

## 2018-11-06 VITALS — WEIGHT: 240 LBS | HEIGHT: 73 IN | BODY MASS INDEX: 31.81 KG/M2

## 2018-11-06 DIAGNOSIS — M54.12 CERVICAL RADICULOPATHY: ICD-10-CM

## 2018-11-06 DIAGNOSIS — M75.111 INCOMPLETE TEAR OF RIGHT ROTATOR CUFF: Primary | ICD-10-CM

## 2018-11-06 DIAGNOSIS — M75.41 IMPINGEMENT SYNDROME OF RIGHT SHOULDER: ICD-10-CM

## 2018-11-06 DIAGNOSIS — M25.511 ACUTE PAIN OF RIGHT SHOULDER: ICD-10-CM
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
## 2018-11-06 PROCEDURE — 99024 POSTOP FOLLOW-UP VISIT: CPT | Performed by: ORTHOPAEDIC SURGERY

## 2018-11-06 PROCEDURE — 99080 SPECIAL REPORTS OR FORMS: CPT | Performed by: ORTHOPAEDIC SURGERY

## 2018-11-06 NOTE — PROGRESS NOTES
"Marok Jaquez is a 32 y.o. male returns for     Chief Complaint   Patient presents with   • Right Shoulder - Follow-up       HISTORY OF PRESENT ILLNESS: Patient being seen for right shoulder follow up. Patient doing physical therapy at [facility] 2 days/week.   Patient states that he has been working with physical therapy and seems to slowly be improving.  His motion is improving but he still has significant weakness.  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  Surgery was 8/10/18.  Still has occasional dull ache.     CONCURRENT MEDICAL HISTORY:    The following portions of the patient's history were reviewed and updated as appropriate: allergies, current medications, past family history, past medical history, past social history, past surgical history and problem list.     ROS  No fevers or chills.  No chest pain or shortness of air.  No GI or  disturbances.    PHYSICAL EXAMINATION:       Ht 185.4 cm (73\")   Wt 109 kg (240 lb)   BMI 31.66 kg/m²     Physical Exam   Constitutional: He is oriented to person, place, and time. He appears well-developed and well-nourished.   Neurological: He is alert and oriented to person, place, and time.   Psychiatric: He has a normal mood and affect. His behavior is normal. Judgment and thought content normal.       GAIT:     [x]  Normal  []  Antalgic    Assistive device: [x]  None  []  Walker     []  Crutches  []  Cane     []  Wheelchair  []  Stretcher    Right Shoulder Exam     Tenderness   The patient is experiencing no tenderness.        Range of Motion   Active Abduction: 150   Forward Flexion: 160   External Rotation: 80   Internal Rotation 90 degrees: 30     Muscle Strength   Abduction: 4/5   Supraspinatus: 4/5     Tests   Hawkin's test: negative    Other   Erythema: absent  Sensation: normal  Pulse: present                      ASSESSMENT:    Diagnoses and all orders for this visit:    Incomplete tear of right " rotator cuff  -     Ambulatory Referral to Physical Therapy    Acute pain of right shoulder  -     Ambulatory Referral to Physical Therapy    Impingement syndrome of right shoulder  -     Ambulatory Referral to Physical Therapy    Work related injury  -     Ambulatory Referral to Physical Therapy    Cervical radiculopathy  -     Ambulatory Referral to Physical Therapy    Discussion with  was held with the patient in the room.  We discussed work activity, progression, further treatment options, and return to work.  All questions were answered.      PLAN    Range of motion is about the same as it was approximately a month ago.  He feels as if he is getting better from a functional standpoint.  However, his job requires extreme conditions and heavy lifting.  It also requires a significant work at and above shoulder level.  We discussed that he was unable to return to work at this time.  He is not at [facility].  We will try 4 weeks of work conditioning to assess his functional improvement and his functional abilities.  If he is unable return to work at that time it is likely we would proceed with an FCE.  If he does progress well then he may be a little return to work in the next 4-8 weeks.    Patient's Body mass index is 31.66 kg/m². BMI is above normal parameters. Recommendations include: exercise counseling and nutrition counseling.      Return in about 5 weeks (around 12/11/2018) for recheck.    David Miller MD

## 2018-11-07 ENCOUNTER — OFFICE VISIT (OUTPATIENT)
Dept: FAMILY MEDICINE CLINIC | Facility: CLINIC | Age: 33
End: 2018-11-07

## 2018-11-07 ENCOUNTER — LAB (OUTPATIENT)
Dept: LAB | Facility: OTHER | Age: 33
End: 2018-11-07

## 2018-11-07 VITALS
OXYGEN SATURATION: 98 % | HEART RATE: 92 BPM | RESPIRATION RATE: 16 BRPM | SYSTOLIC BLOOD PRESSURE: 120 MMHG | BODY MASS INDEX: 32.47 KG/M2 | DIASTOLIC BLOOD PRESSURE: 60 MMHG | TEMPERATURE: 96.9 F | HEIGHT: 73 IN | WEIGHT: 245 LBS

## 2018-11-07 DIAGNOSIS — R06.83 SNORING: ICD-10-CM

## 2018-11-07 DIAGNOSIS — F32.A DEPRESSION, UNSPECIFIED DEPRESSION TYPE: Primary | ICD-10-CM

## 2018-11-07 DIAGNOSIS — E34.9 TESTOSTERONE DEFICIENCY: ICD-10-CM

## 2018-11-07 DIAGNOSIS — M25.511 ACUTE PAIN OF RIGHT SHOULDER: ICD-10-CM

## 2018-11-07 DIAGNOSIS — E55.9 VITAMIN D DEFICIENCY: ICD-10-CM

## 2018-11-07 DIAGNOSIS — R74.8 ELEVATED LIVER ENZYMES: ICD-10-CM

## 2018-11-07 DIAGNOSIS — R09.81 NASAL CONGESTION: ICD-10-CM

## 2018-11-07 DIAGNOSIS — Z13.0 SCREENING FOR DEFICIENCY ANEMIA: ICD-10-CM

## 2018-11-07 DIAGNOSIS — Z13.220 SCREENING FOR LIPID DISORDERS: ICD-10-CM

## 2018-11-07 DIAGNOSIS — Z13.1 SCREENING FOR DIABETES MELLITUS: ICD-10-CM

## 2018-11-07 DIAGNOSIS — R06.81 EPISODE OF APNEA: ICD-10-CM

## 2018-11-07 LAB
25(OH)D3 SERPL-MCNC: 34 NG/ML (ref 30–100)
ALBUMIN SERPL-MCNC: 4.2 G/DL (ref 3.5–5)
ALBUMIN/GLOB SERPL: 1.3 G/DL (ref 1.1–1.8)
ALP SERPL-CCNC: 74 U/L (ref 38–126)
ALT SERPL W P-5'-P-CCNC: 162 U/L
ANION GAP SERPL CALCULATED.3IONS-SCNC: 6 MMOL/L (ref 5–15)
AST SERPL-CCNC: 71 U/L (ref 17–59)
BASOPHILS # BLD AUTO: 0.04 10*3/MM3 (ref 0–0.2)
BASOPHILS NFR BLD AUTO: 0.5 % (ref 0–2)
BILIRUB SERPL-MCNC: 0.7 MG/DL (ref 0.2–1.3)
BUN BLD-MCNC: 8 MG/DL (ref 9–20)
BUN/CREAT SERPL: 7.7 (ref 7–25)
CALCIUM SPEC-SCNC: 9.3 MG/DL (ref 8.4–10.2)
CHLORIDE SERPL-SCNC: 109 MMOL/L (ref 98–107)
CHOLEST SERPL-MCNC: 197 MG/DL (ref 150–200)
CO2 SERPL-SCNC: 28 MMOL/L (ref 22–30)
CREAT BLD-MCNC: 1.04 MG/DL (ref 0.66–1.25)
DEPRECATED RDW RBC AUTO: 43.4 FL (ref 35.1–43.9)
EOSINOPHIL # BLD AUTO: 0.43 10*3/MM3 (ref 0–0.7)
EOSINOPHIL NFR BLD AUTO: 5.5 % (ref 0–7)
ERYTHROCYTE [DISTWIDTH] IN BLOOD BY AUTOMATED COUNT: 13.5 % (ref 11.5–14.5)
GFR SERPL CREATININE-BSD FRML MDRD: 82 ML/MIN/1.73 (ref 70–162)
GLOBULIN UR ELPH-MCNC: 3.3 GM/DL (ref 2.3–3.5)
GLUCOSE BLD-MCNC: 103 MG/DL (ref 74–99)
HCT VFR BLD AUTO: 44.8 % (ref 39–49)
HDLC SERPL-MCNC: 31 MG/DL (ref 40–59)
HGB BLD-MCNC: 15.4 G/DL (ref 13.7–17.3)
LDLC SERPL CALC-MCNC: 117 MG/DL
LDLC/HDLC SERPL: 3.79 {RATIO} (ref 0–3.55)
LYMPHOCYTES # BLD AUTO: 2.82 10*3/MM3 (ref 0.6–4.2)
LYMPHOCYTES NFR BLD AUTO: 36.3 % (ref 10–50)
MCH RBC QN AUTO: 31 PG (ref 26.5–34)
MCHC RBC AUTO-ENTMCNC: 34.4 G/DL (ref 31.5–36.3)
MCV RBC AUTO: 90.3 FL (ref 80–98)
MONOCYTES # BLD AUTO: 0.59 10*3/MM3 (ref 0–0.9)
MONOCYTES NFR BLD AUTO: 7.6 % (ref 0–12)
NEUTROPHILS # BLD AUTO: 3.88 10*3/MM3 (ref 2–8.6)
NEUTROPHILS NFR BLD AUTO: 50.1 % (ref 37–80)
PLATELET # BLD AUTO: 320 10*3/MM3 (ref 150–450)
PMV BLD AUTO: 10.2 FL (ref 8–12)
POTASSIUM BLD-SCNC: 4.8 MMOL/L (ref 3.4–5)
PROT SERPL-MCNC: 7.5 G/DL (ref 6.3–8.2)
RBC # BLD AUTO: 4.96 10*6/MM3 (ref 4.37–5.74)
SODIUM BLD-SCNC: 143 MMOL/L (ref 137–145)
TRIGL SERPL-MCNC: 243 MG/DL
VLDLC SERPL-MCNC: 48.6 MG/DL
WBC NRBC COR # BLD: 7.76 10*3/MM3 (ref 3.2–9.8)

## 2018-11-07 PROCEDURE — 84402 ASSAY OF FREE TESTOSTERONE: CPT | Performed by: NURSE PRACTITIONER

## 2018-11-07 PROCEDURE — 99214 OFFICE O/P EST MOD 30 MIN: CPT | Performed by: NURSE PRACTITIONER

## 2018-11-07 PROCEDURE — 36415 COLL VENOUS BLD VENIPUNCTURE: CPT | Performed by: NURSE PRACTITIONER

## 2018-11-07 PROCEDURE — 85025 COMPLETE CBC W/AUTO DIFF WBC: CPT | Performed by: NURSE PRACTITIONER

## 2018-11-07 PROCEDURE — 80061 LIPID PANEL: CPT | Performed by: NURSE PRACTITIONER

## 2018-11-07 PROCEDURE — 80053 COMPREHEN METABOLIC PANEL: CPT | Performed by: NURSE PRACTITIONER

## 2018-11-07 PROCEDURE — 84403 ASSAY OF TOTAL TESTOSTERONE: CPT | Performed by: NURSE PRACTITIONER

## 2018-11-07 PROCEDURE — 82306 VITAMIN D 25 HYDROXY: CPT | Performed by: NURSE PRACTITIONER

## 2018-11-07 RX ORDER — BUPROPION HYDROCHLORIDE 150 MG/1
150 TABLET ORAL DAILY
Qty: 30 TABLET | Refills: 5 | Status: SHIPPED | OUTPATIENT
Start: 2018-11-07 | End: 2019-06-17 | Stop reason: SDUPTHER

## 2018-11-07 RX ORDER — BACLOFEN 10 MG/1
10 TABLET ORAL 3 TIMES DAILY
Qty: 90 TABLET | Refills: 5 | Status: SHIPPED | OUTPATIENT
Start: 2018-11-07 | End: 2019-06-17 | Stop reason: SDUPTHER

## 2018-11-07 RX ORDER — MELOXICAM 15 MG/1
15 TABLET ORAL DAILY
Qty: 30 TABLET | Refills: 5 | Status: SHIPPED | OUTPATIENT
Start: 2018-11-07 | End: 2019-06-17 | Stop reason: SDUPTHER

## 2018-11-07 RX ORDER — BUSPIRONE HYDROCHLORIDE 5 MG/1
5 TABLET ORAL 3 TIMES DAILY
Qty: 90 TABLET | Refills: 5 | Status: SHIPPED | OUTPATIENT
Start: 2018-11-07 | End: 2019-06-17 | Stop reason: SDUPTHER

## 2018-11-08 ENCOUNTER — TELEPHONE (OUTPATIENT)
Dept: FAMILY MEDICINE CLINIC | Facility: CLINIC | Age: 33
End: 2018-11-08

## 2018-11-08 NOTE — TELEPHONE ENCOUNTER
----- Message from CLOVIS Arreola sent at 11/7/2018  5:41 PM CST -----  Call wife with results at same time if you want.    Vit d is normal but on lower side is he taking supplement? Would a good idea to take otc MVT or calcium/vit d supplement.    Xray neg for any type of lesion or mass, will refer to Ent dr zapata as discussed at appt if pt still okay with that

## 2018-11-08 NOTE — TELEPHONE ENCOUNTER
Called spoke to pt relayed results to him dipika his wife pt will also be getting supplement as instructed

## 2018-11-10 LAB
TESTOST FREE SERPL-MCNC: 11.4 PG/ML (ref 8.7–25.1)
TESTOST SERPL-MCNC: 382 NG/DL (ref 264–916)

## 2018-11-11 PROBLEM — R74.8 ELEVATED LIVER ENZYMES: Status: ACTIVE | Noted: 2018-11-11

## 2018-11-11 PROBLEM — R06.81 EPISODE OF APNEA: Status: ACTIVE | Noted: 2018-11-11

## 2018-11-11 PROBLEM — R06.83 SNORING: Status: ACTIVE | Noted: 2018-11-11

## 2018-11-11 PROBLEM — R09.81 NASAL CONGESTION: Status: ACTIVE | Noted: 2018-11-11

## 2018-11-11 PROBLEM — F32.A DEPRESSION: Status: ACTIVE | Noted: 2018-11-11

## 2018-11-11 PROBLEM — E34.9 TESTOSTERONE DEFICIENCY: Status: ACTIVE | Noted: 2018-11-11

## 2018-11-12 NOTE — PROGRESS NOTES
Subjective   Marko Jaquez is a 32 y.o. male who presents to the office for f/u.     History of Present Illness     Lab work done 11/7/18. Will review when results come in. CMP does show elevated liver enzymes.    Elevated liver enzymes-acute.  -avoid nsaids, increase water intake, recheck in one mtn.    Testosterone deficiency-chronic, improving with testosterone 200mg/1ml injection mtnly.   -initial testosterone on 3/7/18 was 365. Pt is not taking reguarly, just forgets.     Anxiety/depression-chronic, controlled with wellbutrin 150mg daily and buspar 5mg tid prn.    Possible sleep apnea/snoring-chronic, getting worse over past few mtns  -wife states pt has periods of stopping breathing x several secs and snores as well. Would like to see sleep specialist in [place].    Nasal congestion possibly secondary to left nostril clogging/narrowing-acute worsening over past few mtns.  -can't breathe out of left nostril, getting worse over past few mtns, states no actually nasal congestion more so unable to get airflow though that nostril, no sinus symptoms.      Past medical history:  Pt has hx of rotator cuff of right shoulder, recent surgery, being followed by orthopedics controlled on mobic and baclofen, worker's comp incident.     The following portions of the patient's history were reviewed and updated as appropriate: allergies, current medications, past family history, past medical history, past social history, past surgical history and problem list.    Review of Systems   Constitutional: Negative.    HENT: Negative for congestion, facial swelling, nosebleeds, postnasal drip, rhinorrhea, sinus pressure, sinus pain, sneezing, sore throat, tinnitus and trouble swallowing.         Decreased air flow through left nostril     Eyes: Negative.    Respiratory: Negative.    Cardiovascular: Negative.    Gastrointestinal: Negative.    Genitourinary: Negative.    Musculoskeletal: Positive for arthralgias.   Neurological:  "Negative.    Psychiatric/Behavioral: Negative for agitation, behavioral problems, confusion, decreased concentration, hallucinations, self-injury, sleep disturbance and suicidal ideas. The patient is nervous/anxious (improved with medications). The patient is not hyperactive.        Past Medical History:   Diagnosis Date   • Acute bronchitis    • Acute pharyngitis    • Backache    • Chronic low back pain    • Degenerative disc disease, lumbar    • Dysfunction of eustachian tube    • Exanthematous disorder    • Generalized abdominal pain    • GERD (gastroesophageal reflux disease)    • Headache    • Health examination of defined sub-group    • Heat exhaustion    • Influenza- like illness    • Intervertebral disc prolapse    • Low back pain     with radiculopathy   • Malaise and fatigue    • Pain in elbow    • Pneumonia    • Sciatica    • Skin sensation disturbance        History reviewed. No pertinent family history.       Objective   /60   Pulse 92   Temp 96.9 °F (36.1 °C) (Temporal Artery )   Resp 16   Ht 185.4 cm (73\")   Wt 111 kg (245 lb)   SpO2 98%   BMI 32.32 kg/m²   Physical Exam   Constitutional: He is oriented to person, place, and time. He appears well-developed and well-nourished. He is cooperative. No distress.   HENT:   Head: Normocephalic.   Right Ear: Hearing, tympanic membrane, external ear and ear canal normal.   Left Ear: Hearing, tympanic membrane, external ear and ear canal normal.   Nose: Nasal deformity (does seem to ahve some narrowing of left nostril) present. No mucosal edema, rhinorrhea or septal deviation. Right sinus exhibits no maxillary sinus tenderness and no frontal sinus tenderness. Left sinus exhibits no maxillary sinus tenderness and no frontal sinus tenderness.   Mouth/Throat: Uvula is midline, oropharynx is clear and moist and mucous membranes are normal. Tonsils are 0 on the right. Tonsils are 0 on the left. No tonsillar exudate.   Eyes: Conjunctivae and lids are " normal. Right eye exhibits no discharge. No foreign body present in the right eye. Left eye exhibits no discharge. No foreign body present in the left eye. No scleral icterus.   Cardiovascular: Normal rate, regular rhythm, normal heart sounds and intact distal pulses. Exam reveals no gallop and no friction rub.   No murmur heard.  Pulmonary/Chest: Effort normal and breath sounds normal. No respiratory distress. He has no wheezes. He has no rales.   Abdominal: Soft. Normal appearance and bowel sounds are normal. He exhibits no shifting dullness, no distension, no pulsatile liver, no fluid wave, no abdominal bruit, no ascites, no pulsatile midline mass and no mass. There is no hepatosplenomegaly. There is no tenderness. There is no rigidity, no rebound, no guarding and no CVA tenderness. No hernia.   Musculoskeletal:        Right shoulder: He exhibits decreased range of motion, tenderness, pain and decreased strength. He exhibits no bony tenderness, no swelling, no effusion, no crepitus, no deformity, no laceration, no spasm and normal pulse.   Lymphadenopathy:     He has no cervical adenopathy.   Neurological: He is alert and oriented to person, place, and time. He is not disoriented. Coordination and gait normal.   Skin: Skin is warm and dry.   Psychiatric: He has a normal mood and affect. His speech is normal and behavior is normal. Thought content normal. He is not actively hallucinating. Cognition and memory are normal.   Patient is dressed appropriately for weather and situation, makes eye contact, and engages in conversation.  He is attentive.   Nursing note and vitals reviewed.       PHQ-2/PHQ-9 Depression Screening 5/3/2018   Little interest or pleasure in doing things 0   Feeling down, depressed, or hopeless 1   Trouble falling or staying asleep, or sleeping too much -   Feeling tired or having little energy -   Poor appetite or overeating -   Feeling bad about yourself - or that you are a failure or have  let yourself or your family down -   Trouble concentrating on things, such as reading the newspaper or watching television -   Moving or speaking so slowly that other people could have noticed. Or the opposite - being so fidgety or restless that you have been moving around a lot more than usual -   Thoughts that you would be better off dead, or of hurting yourself in some way -   Total Score 1   If you checked off any problems, how difficult have these problems made it for you to do your work, take care of things at home, or get along with other people? -         Assessment/Plan   Marko was seen today for med refill.    Diagnoses and all orders for this visit:    Depression, unspecified depression type  -     busPIRone (BUSPAR) 5 MG tablet; Take 1 tablet by mouth 3 (Three) Times a Day.  -     buPROPion XL (WELLBUTRIN XL) 150 MG 24 hr tablet; Take 1 tablet by mouth Daily.    Acute pain of right shoulder  -     meloxicam (MOBIC) 15 MG tablet; Take 1 tablet by mouth Daily.  -     baclofen (LIORESAL) 10 MG tablet; Take 1 tablet by mouth 3 (Three) Times a Day.    Nasal congestion  Comments:  left nostril narrowing, decreased airflow through left nostril, not actually nasal congestion, more so nasal obstruction  Orders:  -     XR sinuses 3+ vw  -     Ambulatory Referral to Sleep Medicine  -     Ambulatory Referral to ENT (Otolaryngology)    Testosterone deficiency    Snoring  -     Ambulatory Referral to Sleep Medicine  -     Ambulatory Referral to ENT (Otolaryngology)    Episode of apnea  Comments:  noted by wife x several seconds    Orders:  -     Ambulatory Referral to Sleep Medicine  -     Ambulatory Referral to ENT (Otolaryngology)    Elevated liver enzymes  -     Comprehensive Metabolic Panel; Future           Lab work done 11/7/18. Will review when results come in. CMP does show elevated liver enzymes.    Elevated liver enzymes-acute.  -avoid nsaids, increase water intake, recheck in one mtn.    Testosterone  deficiency-chronic, improving with testosterone 200mg/1ml injection mtnly.   -initial testosterone on 3/7/18 was 365. Pt is not taking reguarly, just forgets.     Anxiety/depression-chronic, controlled with wellbutrin 150mg daily and buspar 5mg tid prn.    Possible sleep apnea/snoring-chronic, getting worse over past few mtns  -will refer to sleep specialist in [place].    Nasal congestion possibly secondary to left nostril clogging/narrowing-acute worsening over past few mtns.  -can't breathe out of left nostril, getting worse over past few mtns, states no actually nasal congestion more so unable to get airflow though that nostril, no sinus symptoms.    -xray of sinuses, will call about results and if normal refer to ent dr zapata.     Patient educated to follow-up sooner than next scheduled appointment if condition(s) worse or do not improve. Patient states understanding and is in agreeance with plan of care. An After Visit Summary was printed and given to the patient.      CLOVIS Arreola        This document has been electronically signed by CLOVIS Arreola on November 11, 2018 10:38 PM      EMR/Transcription Dragon Disclaimer:  Some of this note may be an electronic dragon transcription/translation of spoken language to printed text. The electronic translation of spoken language may permit erroneous, or at times, nonsensical words or phrases to be inadvertently transcribed. Although I have reviewed the note for such errors, some may still exist.

## 2018-12-06 ENCOUNTER — OFFICE VISIT (OUTPATIENT)
Dept: ORTHOPEDIC SURGERY | Facility: CLINIC | Age: 33
End: 2018-12-06

## 2018-12-06 VITALS — WEIGHT: 241 LBS | HEIGHT: 73 IN | BODY MASS INDEX: 31.94 KG/M2

## 2018-12-06 DIAGNOSIS — M75.41 IMPINGEMENT SYNDROME OF RIGHT SHOULDER: ICD-10-CM

## 2018-12-06 DIAGNOSIS — M75.111 INCOMPLETE TEAR OF RIGHT ROTATOR CUFF: Primary | ICD-10-CM
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
## 2018-12-06 PROCEDURE — 99213 OFFICE O/P EST LOW 20 MIN: CPT | Performed by: ORTHOPAEDIC SURGERY

## 2018-12-06 PROCEDURE — 99080 SPECIAL REPORTS OR FORMS: CPT | Performed by: ORTHOPAEDIC SURGERY

## 2018-12-06 NOTE — PROGRESS NOTES
"Marko Jaquez is a 33 y.o. male returns for     Chief Complaint   Patient presents with   • Right Shoulder - Follow-up   • Work Related Injury       HISTORY OF PRESENT ILLNESS:  Follow up right shoulder.   DOS 8/10/18.    Therapy at [facility]. Work conditioning.  Has been to 6-7 visits of work conditioning but has also missed/rescheduled several appointments due to travel and family issues.  Having occasional sharp stabbing pains.  Has been unable to return to work.  He is definitely worried about the strength and ability to do the required elements of his job.        CONCURRENT MEDICAL HISTORY:    The following portions of the patient's history were reviewed and updated as appropriate: allergies, current medications, past family history, past medical history, past social history, past surgical history and problem list.     ROS  No fevers or chills.  No chest pain or shortness of air.  No GI or  disturbances.    PHYSICAL EXAMINATION:       Ht 185.4 cm (73\")   Wt 109 kg (241 lb)   BMI 31.80 kg/m²     Physical Exam   Constitutional: He is oriented to person, place, and time. He appears well-developed and well-nourished.   Neurological: He is alert and oriented to person, place, and time.   Psychiatric: He has a normal mood and affect. His behavior is normal. Judgment and thought content normal.       GAIT:     [x]  Normal  []  Antalgic    Assistive device: [x]  None  []  Walker     []  Crutches  []  Cane     []  Wheelchair  []  Stretcher    Right Shoulder Exam     Tenderness   Right shoulder tenderness location: mild diffuse tenderness.    Range of Motion   Active abduction: 130 (has a catching sensation at 120)   External rotation: 90   Forward flexion: 160 (has a catching sensation at 120)   Internal rotation 90 degrees: 40     Muscle Strength   Abduction: 4/5   Supraspinatus: 4/5     Tests   Lloyd test: negative    Other   Erythema: absent  Sensation: normal  Pulse: present              No results " found.          ASSESSMENT:    Diagnoses and all orders for this visit:    Incomplete tear of right rotator cuff    Impingement syndrome of right shoulder    Work related injury          PLAN    Patient is fearful of returning to work and repeat injury.  His motion is improving but still has significant limitation in abduction and forward flexion.  Discussed FCE to assess actual ability, limitation, strength, and validity  Continue off work for now  Re-assess after FCE to determine further treatment options.  Not at MMI    Patient's Body mass index is 31.8 kg/m². BMI is above normal parameters. Recommendations include: exercise counseling and nutrition counseling.'      Discussion with  was held with the patient in the room.  We discussed work activity, progression, further treatment options, and return to work.  All questions were answered.      Return after FCE.    David Miller MD

## 2018-12-10 DIAGNOSIS — M25.511 ACUTE PAIN OF RIGHT SHOULDER: Primary | ICD-10-CM

## 2018-12-10 DIAGNOSIS — R20.2 NUMBNESS AND TINGLING OF RIGHT ARM: ICD-10-CM

## 2018-12-10 DIAGNOSIS — M75.41 IMPINGEMENT SYNDROME OF RIGHT SHOULDER: ICD-10-CM

## 2018-12-10 DIAGNOSIS — R20.0 NUMBNESS AND TINGLING OF RIGHT ARM: ICD-10-CM

## 2018-12-10 DIAGNOSIS — M75.111 INCOMPLETE TEAR OF RIGHT ROTATOR CUFF: ICD-10-CM

## 2018-12-11 ENCOUNTER — OFFICE VISIT (OUTPATIENT)
Dept: OTOLARYNGOLOGY | Facility: CLINIC | Age: 33
End: 2018-12-11

## 2018-12-11 VITALS — OXYGEN SATURATION: 99 % | WEIGHT: 247 LBS | BODY MASS INDEX: 33.46 KG/M2 | HEIGHT: 72 IN

## 2018-12-11 DIAGNOSIS — J31.0 CHRONIC RHINITIS: Primary | ICD-10-CM

## 2018-12-11 DIAGNOSIS — J34.2 NASAL SEPTAL DEFORMITY: ICD-10-CM

## 2018-12-11 PROCEDURE — 99243 OFF/OP CNSLTJ NEW/EST LOW 30: CPT | Performed by: OTOLARYNGOLOGY

## 2018-12-11 RX ORDER — FLUTICASONE PROPIONATE 50 MCG
2 SPRAY, SUSPENSION (ML) NASAL DAILY
Qty: 16 G | Refills: 11 | Status: SHIPPED | OUTPATIENT
Start: 2018-12-11 | End: 2020-04-10 | Stop reason: SDUPTHER

## 2018-12-12 NOTE — PROGRESS NOTES
Subjective   Marko Jaquez is a 33 y.o. male.  This is a consultation from Jaycee BRANNON     History of Present Illness   Patient reports she has had nasal congestion and decreased nasal airflow particularly on the left that is long-standing.  No previous nasal surgery or injury.  Nothing in particular seems to a broad this on.  Has not tried any medications thus far.  Denies using over-the-counter nasal decongestants.  Has some trouble with crusting which she states will worsen the sensation of nasal obstruction.  No purulent rhinorrhea.  Does have some postnasal drainage at times.  Symptoms are present on a daily basis.  Describes dependent nasal obstruction during sleep.      The following portions of the patient's history were reviewed and updated as appropriate: allergies, current medications, past family history, past medical history, past social history, past surgical history and problem list.      Marko Jaquez reports that he has quit smoking. His smoking use included cigarettes. He smoked 1.00 pack per day. He uses smokeless tobacco. He reports that he drinks alcohol. He reports that he does not use drugs.  Patient is not a tobacco user and has not been counseled for use of tobacco products    No family history on file.  Negative for bleeding disorder  Allergies   Allergen Reactions   • Lortab [Hydrocodone-Acetaminophen] Nausea And Vomiting         Current Outpatient Medications:   •  baclofen (LIORESAL) 10 MG tablet, Take 1 tablet by mouth 3 (Three) Times a Day., Disp: 90 tablet, Rfl: 5  •  buPROPion XL (WELLBUTRIN XL) 150 MG 24 hr tablet, Take 1 tablet by mouth Daily., Disp: 30 tablet, Rfl: 5  •  busPIRone (BUSPAR) 5 MG tablet, Take 1 tablet by mouth 3 (Three) Times a Day., Disp: 90 tablet, Rfl: 5  •  meloxicam (MOBIC) 15 MG tablet, Take 1 tablet by mouth Daily., Disp: 30 tablet, Rfl: 5  •  Testosterone Cypionate (DEPO-TESTOSTERONE) 200 MG/ML injection, Inject 1 mL into the appropriate muscle  as directed by prescriber Every 30 (Thirty) Days., Disp: 1 mL, Rfl: 2  •  fluticasone (FLONASE) 50 MCG/ACT nasal spray, 2 sprays into the nostril(s) as directed by provider Daily., Disp: 16 g, Rfl: 11    Past Medical History:   Diagnosis Date   • Acute bronchitis    • Acute pharyngitis    • Backache    • Chronic low back pain    • Degenerative disc disease, lumbar    • Dysfunction of eustachian tube    • Exanthematous disorder    • Generalized abdominal pain    • GERD (gastroesophageal reflux disease)    • Headache    • Health examination of defined sub-group    • Heat exhaustion    • Influenza- like illness    • Intervertebral disc prolapse    • Low back pain     with radiculopathy   • Malaise and fatigue    • Pain in elbow    • Pneumonia    • Sciatica    • Skin sensation disturbance          Review of Systems   Constitutional: Negative for fever.   HENT: Positive for congestion.    All other systems reviewed and are negative.          Objective   Physical Exam  General: Well-developed well-nourished male in no acute distress.  Alert and oriented ×3. Head: Normocephalic. Face: Symmetrical strength and appearance. PERRL. EOMI. Voice:Strong. Speech:Fluent  Ears: External ears no deformity, canals no discharge, tympanic membranes intact clear and mobile bilaterally.  Nose: Nares show no discharge mass polyp or purulence.  Boggy mucosa is present.  No gross external deformity.  Septum: To the left anteriorly.  Mild crusting without evidence of purulence.  Oral cavity: Lips and gums without lesions.  Tongue and floor of mouth without lesions.  Parotid and submandibular ducts unobstructed.  No mucosal lesions on the buccal mucosa or vestibule of the mouth.  Pharynx: No erythema exudate mass or ulcer  Neck: No lymphadenopathy.  No thyromegaly.  Trachea and larynx midline.  No masses in the parotid or submandibular glands.        Assessment/Plan   Marko was seen today for nasal congestion.    Diagnoses and all orders for  this visit:    Chronic rhinitis    Nasal septal deformity    Other orders  -     fluticasone (FLONASE) 50 MCG/ACT nasal spray; 2 sprays into the nostril(s) as directed by provider Daily.      Plan: Before considering surgery would like a trial of medical therapy to consist Flonase 2 sprays each nostril daily along with using nasal saline spray 2 sprays each nostril at least 3-4 times a day and as needed.  Reevaluate in 1 month.  If no improvement will consider surgery    Thanks to Ms. Mcbride for this consultation

## 2019-01-02 ENCOUNTER — APPOINTMENT (OUTPATIENT)
Dept: PHYSICAL THERAPY | Facility: HOSPITAL | Age: 34
End: 2019-01-02

## 2019-01-15 ENCOUNTER — OFFICE VISIT (OUTPATIENT)
Dept: ORTHOPEDIC SURGERY | Facility: CLINIC | Age: 34
End: 2019-01-15

## 2019-01-15 VITALS — BODY MASS INDEX: 34.13 KG/M2 | HEIGHT: 72 IN | WEIGHT: 252 LBS

## 2019-01-15 DIAGNOSIS — M75.111 INCOMPLETE TEAR OF RIGHT ROTATOR CUFF: ICD-10-CM
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
## 2019-01-15 DIAGNOSIS — M25.511 ACUTE PAIN OF RIGHT SHOULDER: ICD-10-CM

## 2019-01-15 DIAGNOSIS — M75.41 IMPINGEMENT SYNDROME OF RIGHT SHOULDER: ICD-10-CM

## 2019-01-15 DIAGNOSIS — R20.0 NUMBNESS AND TINGLING OF RIGHT ARM: Primary | ICD-10-CM

## 2019-01-15 DIAGNOSIS — R20.2 NUMBNESS AND TINGLING OF RIGHT ARM: Primary | ICD-10-CM

## 2019-01-15 PROCEDURE — 99213 OFFICE O/P EST LOW 20 MIN: CPT | Performed by: ORTHOPAEDIC SURGERY

## 2019-01-15 PROCEDURE — 99080 SPECIAL REPORTS OR FORMS: CPT | Performed by: ORTHOPAEDIC SURGERY

## 2019-01-15 NOTE — PROGRESS NOTES
"Marko Jaquez is a 33 y.o. male returns for     Chief Complaint   Patient presents with   • Right Shoulder - Follow-up, Work Related Injury   • Results       HISTORY OF PRESENT ILLNESS: f/u right shoulder injury, FCE done on 12/21/2018  No new complaints  Still with some pain in right shoulder  Occasional pain at night but sleeping most nights.  Occasionally having numbness and tingling right middle, ring, and small fingers.       CONCURRENT MEDICAL HISTORY:    The following portions of the patient's history were reviewed and updated as appropriate: allergies, current medications, past family history, past medical history, past social history, past surgical history and problem list.     ROS  No fevers or chills.  No chest pain or shortness of air.  No GI or  disturbances.    PHYSICAL EXAMINATION:       Ht 182.9 cm (72\")   Wt 114 kg (252 lb)   BMI 34.18 kg/m²     Physical Exam   Constitutional: He is oriented to person, place, and time. He appears well-developed and well-nourished.   Neurological: He is alert and oriented to person, place, and time.   Psychiatric: He has a normal mood and affect. His behavior is normal. Judgment and thought content normal.       GAIT:     [x]  Normal  []  Antalgic    Assistive device: [x]  None  []  Walker     []  Crutches  []  Cane     []  Wheelchair  []  Stretcher    Right Shoulder Exam     Tenderness   Right shoulder tenderness location: mild diffuse tenderness.    Range of Motion   Active abduction: 150   External rotation: 90   Forward flexion: 170   Right shoulder internal rotation 90 degrees: 55.     Muscle Strength   Abduction: 5/5   Supraspinatus: 5/5     Tests   Lloyd test: negative    Other   Erythema: absent  Sensation: normal  Pulse: present                  Discussion with  was held with the patient in the room.  We discussed work activity, progression, further treatment options, and return to work.  All questions were answered.  Hayley Terry " RN        ASSESSMENT:    Diagnoses and all orders for this visit:    Numbness and tingling of right arm    Acute pain of right shoulder    Incomplete tear of right rotator cuff    Impingement syndrome of right shoulder    Work related injury          PLAN    With results of FCE it would seem he could return to work without restrictions.  We discussed activity as tolerated.  Recheck in 6 weeks to assess progress and discuss potential MMI.  Typically MMI about 1 year from surgery    He did have biceps tenderness with testing during FCE.  Mild tenderness in bicipital groove. Will continue to assess progress.      Return in about 6 weeks (around 2/26/2019) for recheck.    David Miller MD

## 2019-02-01 ENCOUNTER — OFFICE VISIT (OUTPATIENT)
Dept: OTOLARYNGOLOGY | Facility: CLINIC | Age: 34
End: 2019-02-01

## 2019-02-01 VITALS — BODY MASS INDEX: 32.51 KG/M2 | OXYGEN SATURATION: 98 % | WEIGHT: 240 LBS | HEIGHT: 72 IN

## 2019-02-01 DIAGNOSIS — J31.0 CHRONIC RHINITIS: Primary | ICD-10-CM

## 2019-02-01 DIAGNOSIS — J34.2 NASAL SEPTAL DEFORMITY: ICD-10-CM

## 2019-02-01 PROCEDURE — 31231 NASAL ENDOSCOPY DX: CPT | Performed by: OTOLARYNGOLOGY

## 2019-02-04 NOTE — PROGRESS NOTES
Subjective   Marko Jaquez is a 33 y.o. male.       History of Present Illness   Patient returns stating that he is still having significant nasal obstruction on a daily basis.  Tried using Flonase and saline with no significant improvement.  No purulent rhinorrhea.  Crusting is not quite as bad as he noted previously.      The following portions of the patient's history were reviewed and updated as appropriate: allergies, current medications, past family history, past medical history, past social history, past surgical history and problem list.     reports that he has been smoking cigarettes.  He has been smoking about 1.00 pack per day. He uses smokeless tobacco. He reports that he drinks alcohol. He reports that he does not use drugs.   Patient is a tobacco user and has been counseled for use of tobacco products      Review of Systems   Constitutional: Negative for fever.           Objective   Physical Exam  General: Well-developed well-nourished male in no acute distress.  Alert and oriented ×3. Head: Normocephalic. Voice:Strong. Speech:Fluent  Ears: External ears no deformity, canals no discharge, tympanic membranes intact clear and mobile bilaterally.  Nose: Nares show no discharge mass polyp or purulence.  Boggy mucosa is present.  No gross external deformity.  Septum: Markedly to the left  Oral cavity: Lips and gums without lesions.  Tongue and floor of mouth without lesions.  Parotid and submandibular ducts unobstructed.  No mucosal lesions on the buccal mucosa or vestibule of the mouth.  Pharynx: No erythema exudate mass or ulcer  Neck: No lymphadenopathy.  No thyromegaly.  Trachea and larynx midline.  No masses in the parotid or submandibular glands.  Nasal endoscopy is performed: Michael-Synephrine and Xylocaine are instilled the nares bilaterally.  0° scope is passed into each nostril.  The inferior, middle, and superior turbinates as well as nasal septum and nasopharynx are examined.  Pertinent  findings include: Septum markedly deformed to the left impacting the lateral nasal wall and inferior turbinate obstructing 100% of the nasal airflow in this area.  Above this I can see the middle meatus and there do not appear to be any polyps or purulence.  On the right naris no mass, polyp, purulence.  No masses in the nasopharynx.    Assessment/Plan   Marko was seen today for follow-up.    Diagnoses and all orders for this visit:    Chronic rhinitis    Nasal septal deformity      Plan: Offered to perform nasal septoplasty.  Explained the nature of the procedure to the patient in layman's terms including need for general anesthetic, and risks including bleeding, infection, poor healing, poor appearance, hole in the nasal septum, numbness of the front teeth and palate, as well as the possibility of failure to improve symptoms and possible need for medical therapy after surgery to maximize symptom improvement.  Proposed benefit would be improved nasal airflow.  The alternative would be observation.  Patient voices understanding of all the above.  He states he will call if he decides to proceed and surgery can be scheduled at that time.

## 2019-02-26 ENCOUNTER — OFFICE VISIT (OUTPATIENT)
Dept: ORTHOPEDIC SURGERY | Facility: CLINIC | Age: 34
End: 2019-02-26

## 2019-02-26 VITALS — BODY MASS INDEX: 34.27 KG/M2 | HEIGHT: 72 IN | WEIGHT: 253 LBS

## 2019-02-26 DIAGNOSIS — M75.41 IMPINGEMENT SYNDROME OF RIGHT SHOULDER: ICD-10-CM

## 2019-02-26 DIAGNOSIS — M75.111 INCOMPLETE TEAR OF RIGHT ROTATOR CUFF: Primary | ICD-10-CM
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
## 2019-02-26 DIAGNOSIS — M25.511 ACUTE PAIN OF RIGHT SHOULDER: ICD-10-CM

## 2019-02-26 DIAGNOSIS — R20.2 NUMBNESS AND TINGLING OF RIGHT ARM: ICD-10-CM

## 2019-02-26 DIAGNOSIS — R20.0 NUMBNESS AND TINGLING OF RIGHT ARM: ICD-10-CM

## 2019-02-26 PROCEDURE — 99080 SPECIAL REPORTS OR FORMS: CPT | Performed by: ORTHOPAEDIC SURGERY

## 2019-02-26 PROCEDURE — 99213 OFFICE O/P EST LOW 20 MIN: CPT | Performed by: ORTHOPAEDIC SURGERY

## 2019-02-26 NOTE — PROGRESS NOTES
"Marko Jaquez is a 33 y.o. male returns for     Chief Complaint   Patient presents with   • Right Shoulder - Follow-up   • Work Related Injury       HISTORY OF PRESENT ILLNESS:  Follow up Right shoulder pain.    Work comp    No longer employed by prior employer.  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  Has trouble with activity over shoulder level.  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  He reports good motion, but difficulty weight in that motion.  Reporting some numbness and tingling in left 4th and 5th fingers.    Aug 10th:  PROCEDURE PERFORMED:      Arthroscopy of the Right shoulder with              1.  DANE procedure              2.  Sub-acromial decompression         CONCURRENT MEDICAL HISTORY:    The following portions of the patient's history were reviewed and updated as appropriate: allergies, current medications, past family history, past medical history, past social history, past surgical history and problem list.     ROS  No fevers or chills.  No chest pain or shortness of air.  No GI or  disturbances.    PHYSICAL EXAMINATION:       Ht 182.9 cm (72\")   Wt 115 kg (253 lb)   BMI 34.31 kg/m²     Physical Exam   Constitutional: He is oriented to person, place, and time. He appears well-developed and well-nourished.   Neurological: He is alert and oriented to person, place, and time.   Psychiatric: He has a normal mood and affect. His behavior is normal. Judgment and thought content normal.       GAIT:     [x]  Normal  []  Antalgic    Assistive device: [x]  None  []  Walker     []  Crutches  []  Cane     []  Wheelchair  []  Stretcher    Right Shoulder Exam     Tenderness   The patient is experiencing no tenderness.    Range of Motion   Active abduction: 170   Forward flexion: 180                       ASSESSMENT:    Diagnoses and all orders for this visit:    Incomplete tear of right rotator cuff  -     Ambulatory Referral to Physical Therapy Evaluate and treat    Acute pain of right shoulder  -     " Ambulatory Referral to Physical Therapy Evaluate and treat    Numbness and tingling of right arm  -     Ambulatory Referral to Physical Therapy Evaluate and treat    Impingement syndrome of right shoulder  -     Ambulatory Referral to Physical Therapy Evaluate and treat    Work related injury  -     Ambulatory Referral to Physical Therapy Evaluate and treat          PLAN    Continue to work on strength and conditioning  Slowly progress as tolerated.  Will get objective strength testing to assess for changes over the next month.  If testing is the same, then will be MMI.    If still improving then anticipate 2-3 more months of observation prior to MMI.    Patient's Body mass index is 34.31 kg/m². BMI is above normal parameters. Recommendations include: exercise counseling and nutrition counseling.      Discussion with  was held with the patient in the room.  We discussed work activity, progression, further treatment options, and return to work.  All questions were answered.    Return in about 6 weeks (around 4/9/2019) for recheck.    David Miller MD

## 2019-04-16 ENCOUNTER — OFFICE VISIT (OUTPATIENT)
Dept: ORTHOPEDIC SURGERY | Facility: CLINIC | Age: 34
End: 2019-04-16

## 2019-04-16 VITALS — WEIGHT: 246 LBS | HEIGHT: 72 IN | BODY MASS INDEX: 33.32 KG/M2

## 2019-04-16 DIAGNOSIS — M75.111 INCOMPLETE TEAR OF RIGHT ROTATOR CUFF: Primary | ICD-10-CM

## 2019-04-16 DIAGNOSIS — M75.41 IMPINGEMENT SYNDROME OF RIGHT SHOULDER: ICD-10-CM
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
## 2019-04-16 DIAGNOSIS — M25.511 ACUTE PAIN OF RIGHT SHOULDER: ICD-10-CM

## 2019-04-16 DIAGNOSIS — R20.2 NUMBNESS AND TINGLING OF RIGHT ARM: ICD-10-CM

## 2019-04-16 DIAGNOSIS — R20.0 NUMBNESS AND TINGLING OF RIGHT ARM: ICD-10-CM

## 2019-04-16 PROCEDURE — 99213 OFFICE O/P EST LOW 20 MIN: CPT | Performed by: ORTHOPAEDIC SURGERY

## 2019-04-16 NOTE — PROGRESS NOTES
"Marko Jaquez is a 33 y.o. male returns for     Chief Complaint   Patient presents with   • Right Shoulder - Follow-up   • Work Related Injury       HISTORY OF PRESENT ILLNESS:  Follow up right shoulder pain. Work related injury.  Arthroscopy right shoulder, Ibeth BEARD    8/10/18  General pain and conditioning is improving.  No numbness or tingling.         CONCURRENT MEDICAL HISTORY:    The following portions of the patient's history were reviewed and updated as appropriate: allergies, current medications, past family history, past medical history, past social history, past surgical history and problem list.     ROS  No fevers or chills.  No chest pain or shortness of air.  No GI or  disturbances.    PHYSICAL EXAMINATION:       Ht 182.9 cm (72\")   Wt 112 kg (246 lb)   BMI 33.36 kg/m²     Physical Exam   Constitutional: He is oriented to person, place, and time. He appears well-developed and well-nourished.   Neurological: He is alert and oriented to person, place, and time.   Psychiatric: He has a normal mood and affect. His behavior is normal. Judgment and thought content normal.       GAIT:     [x]  Normal  []  Antalgic    Assistive device: [x]  None  []  Walker     []  Crutches  []  Cane     []  Wheelchair  []  Stretcher    Right Shoulder Exam     Range of Motion   Active abduction: 160   Forward flexion: 170     Muscle Strength   Abduction: 4/5   External rotation: 5/5   Supraspinatus: 4/5     Tests   Lloyd test: negative    Other   Erythema: absent  Sensation: normal  Pulse: present                Therapy report as of 4/2/19 shows notable improvement in strength for abduction, flexion, and external rotation but still significant weakness as compared to opposite side.      ASSESSMENT:    Diagnoses and all orders for this visit:    Incomplete tear of right rotator cuff  -     Ambulatory Referral to Physical Therapy (Strength testing beginning of May- beginning of June)    Acute pain of right " shoulder  -     Ambulatory Referral to Physical Therapy (Strength testing beginning of May- beginning of June)    Numbness and tingling of right arm  -     Ambulatory Referral to Physical Therapy (Strength testing beginning of May- beginning of June)    Impingement syndrome of right shoulder  -     Ambulatory Referral to Physical Therapy (Strength testing beginning of May- beginning of June)    Work related injury  -     Ambulatory Referral to Physical Therapy (Strength testing beginning of May- beginning of June)          PLAN    Continue work with no restriction - but still has mild physical limitation.    Continue strength and conditioning exercises.    Will recheck for strength assessment 1 x per month.    Recheck in 8 weeks.  Anticipate mmi in the next 2-3 months when strength normalizes.        Patient's Body mass index is 33.36 kg/m². BMI is above normal parameters. Recommendations include: exercise counseling and nutrition counseling.      Discussion with  was held with the patient in the room.  We discussed work activity, progression, further treatment options, and return to work.  All questions were answered.    Return in about 8 weeks (around 6/11/2019) for recheck.    David Miller MD

## 2019-05-15 ENCOUNTER — LAB (OUTPATIENT)
Dept: LAB | Facility: OTHER | Age: 34
End: 2019-05-15

## 2019-05-15 DIAGNOSIS — R74.8 ELEVATED LIVER ENZYMES: ICD-10-CM

## 2019-05-15 LAB
ALBUMIN SERPL-MCNC: 4.2 G/DL (ref 3.5–5)
ALBUMIN/GLOB SERPL: 1.4 G/DL (ref 1.1–1.8)
ALP SERPL-CCNC: 84 U/L (ref 38–126)
ALT SERPL W P-5'-P-CCNC: 113 U/L
ANION GAP SERPL CALCULATED.3IONS-SCNC: 8 MMOL/L (ref 5–15)
AST SERPL-CCNC: 51 U/L (ref 17–59)
BILIRUB SERPL-MCNC: 0.3 MG/DL (ref 0.2–1.3)
BUN BLD-MCNC: 13 MG/DL (ref 7–23)
BUN/CREAT SERPL: 11.4 (ref 7–25)
CALCIUM SPEC-SCNC: 9.3 MG/DL (ref 8.4–10.2)
CHLORIDE SERPL-SCNC: 107 MMOL/L (ref 101–112)
CO2 SERPL-SCNC: 27 MMOL/L (ref 22–30)
CREAT BLD-MCNC: 1.14 MG/DL (ref 0.7–1.3)
GFR SERPL CREATININE-BSD FRML MDRD: 73 ML/MIN/1.73 (ref 70–162)
GLOBULIN UR ELPH-MCNC: 2.9 GM/DL (ref 2.3–3.5)
GLUCOSE BLD-MCNC: 101 MG/DL (ref 70–99)
POTASSIUM BLD-SCNC: 4.3 MMOL/L (ref 3.4–5)
PROT SERPL-MCNC: 7.1 G/DL (ref 6.3–8.6)
SODIUM BLD-SCNC: 142 MMOL/L (ref 137–145)

## 2019-05-15 PROCEDURE — 80053 COMPREHEN METABOLIC PANEL: CPT | Performed by: NURSE PRACTITIONER

## 2019-05-20 ENCOUNTER — OFFICE VISIT (OUTPATIENT)
Dept: FAMILY MEDICINE CLINIC | Facility: CLINIC | Age: 34
End: 2019-05-20

## 2019-05-20 VITALS
TEMPERATURE: 98.3 F | RESPIRATION RATE: 16 BRPM | BODY MASS INDEX: 33.46 KG/M2 | HEART RATE: 85 BPM | WEIGHT: 247 LBS | DIASTOLIC BLOOD PRESSURE: 76 MMHG | OXYGEN SATURATION: 99 % | HEIGHT: 72 IN | SYSTOLIC BLOOD PRESSURE: 144 MMHG

## 2019-05-20 DIAGNOSIS — E78.5 HYPERLIPIDEMIA, UNSPECIFIED HYPERLIPIDEMIA TYPE: ICD-10-CM

## 2019-05-20 DIAGNOSIS — E34.9 TESTOSTERONE DEFICIENCY: ICD-10-CM

## 2019-05-20 DIAGNOSIS — M25.511 ACUTE PAIN OF RIGHT SHOULDER: ICD-10-CM

## 2019-05-20 DIAGNOSIS — R74.8 ELEVATED LIVER ENZYMES: ICD-10-CM

## 2019-05-20 DIAGNOSIS — F32.A DEPRESSION, UNSPECIFIED DEPRESSION TYPE: ICD-10-CM

## 2019-05-20 DIAGNOSIS — R09.81 NASAL CONGESTION: Primary | ICD-10-CM

## 2019-05-20 DIAGNOSIS — K21.9 GASTROESOPHAGEAL REFLUX DISEASE WITHOUT ESOPHAGITIS: ICD-10-CM

## 2019-05-20 DIAGNOSIS — R06.81 EPISODE OF APNEA: ICD-10-CM

## 2019-05-20 PROCEDURE — 99214 OFFICE O/P EST MOD 30 MIN: CPT | Performed by: NURSE PRACTITIONER

## 2019-05-20 RX ORDER — ESOMEPRAZOLE MAGNESIUM 40 MG/1
40 CAPSULE, DELAYED RELEASE ORAL
Qty: 30 CAPSULE | Refills: 5 | Status: SHIPPED | OUTPATIENT
Start: 2019-05-20 | End: 2019-11-22 | Stop reason: SDUPTHER

## 2019-05-20 RX ORDER — CETIRIZINE HYDROCHLORIDE 10 MG/1
10 TABLET ORAL DAILY
Qty: 30 TABLET | Refills: 5 | Status: SHIPPED | OUTPATIENT
Start: 2019-05-20 | End: 2019-11-22 | Stop reason: SDUPTHER

## 2019-06-11 ENCOUNTER — OFFICE VISIT (OUTPATIENT)
Dept: ORTHOPEDIC SURGERY | Facility: CLINIC | Age: 34
End: 2019-06-11

## 2019-06-11 VITALS — WEIGHT: 247 LBS | BODY MASS INDEX: 33.46 KG/M2 | HEIGHT: 72 IN
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
## 2019-06-11 DIAGNOSIS — M75.111 INCOMPLETE TEAR OF RIGHT ROTATOR CUFF: ICD-10-CM

## 2019-06-11 DIAGNOSIS — M75.41 IMPINGEMENT SYNDROME OF RIGHT SHOULDER: ICD-10-CM

## 2019-06-11 DIAGNOSIS — R20.2 NUMBNESS AND TINGLING OF RIGHT ARM: ICD-10-CM

## 2019-06-11 DIAGNOSIS — M25.511 ACUTE PAIN OF RIGHT SHOULDER: ICD-10-CM

## 2019-06-11 DIAGNOSIS — R20.0 NUMBNESS AND TINGLING OF RIGHT ARM: ICD-10-CM

## 2019-06-11 PROCEDURE — 99213 OFFICE O/P EST LOW 20 MIN: CPT | Performed by: ORTHOPAEDIC SURGERY

## 2019-06-11 NOTE — PROGRESS NOTES
"Marko Jaquez is a 33 y.o. male returns for     Chief Complaint   Patient presents with   • Right Shoulder - Follow-up       HISTORY OF PRESENT ILLNESS:WORKERS COMP   f/u on right shoulder,   He still has difficulty with activity that is overhead.  No new injury.  No specific numbness or tingling.  His surgery was August 2018.  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     CONCURRENT MEDICAL HISTORY:    The following portions of the patient's history were reviewed and updated as appropriate: allergies, current medications, past family history, past medical history, past social history, past surgical history and problem list.     ROS  No fevers or chills.  No chest pain or shortness of air.  No GI or  disturbances.    PHYSICAL EXAMINATION:       Ht 182.9 cm (72\")   Wt 112 kg (247 lb)   BMI 33.50 kg/m²     Physical Exam   Constitutional: He is oriented to person, place, and time. He appears well-developed and well-nourished.   Neurological: He is alert and oriented to person, place, and time.   Psychiatric: He has a normal mood and affect. His behavior is normal. Judgment and thought content normal.       GAIT:     [x]  Normal  []  Antalgic    Assistive device: [x]  None  []  Walker     []  Crutches  []  Cane     []  Wheelchair  []  Stretcher    Right Shoulder Exam     Tenderness   The patient is experiencing no tenderness.    Range of Motion   Active abduction: 150   External rotation: 90   Forward flexion: 180   Internal rotation 90 degrees: 60     Muscle Strength   Abduction: 4/5   Supraspinatus: 4/5     Tests   Lloyd test: negative  Impingement: negative    Other   Erythema: absent  Sensation: normal  Pulse: present                      ASSESSMENT:    Diagnoses and all orders for this visit:    Work related injury    Numbness and tingling of right arm    Acute pain of right shoulder    Incomplete tear of right rotator cuff    Impingement syndrome of right " "shoulder          PLAN    A long discussion was had with the patient and the .  We discussed that patient has continued to show slow gradual improvement.  His range of motion has not significantly changed in the last several months.  Although strength has improved, range of motion will be the determining factor for his maximum medical improvement.  His strength deficit with abduction is 14% as compared to the uninjured side.  Flexion deficit is 32% compared to uninjured side and external rotation is 24% as compared to the uninjured side.      However, the AMA \"guides\" fifth edition does not allow for measurement of strength in the presence of range of motion deficits.    MMI as of today's day.  MMI will be determined based off of range of motion as follows         Motion       UE impairment  Flexion (p476, Fig 16-40):    180 degrees = 0 %  Abduction (p477, fig 16-43):  150 degrees = 1%  Int Rot (p479, fig 16-46):  60 degrees = 2%  Ext Rot (p479, fig 16-46):  90 degrees = 0%  _____________________________________________________________    Total upper extremity impairment   = 3%  _____________________________________________________________    Conversion to whole person impairment  = 2%  Page 439, T 16-3)     2% Whole person impairment    I do think it is important to realize that he was injured as an underground , and although his impairment is relatively minor, 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      Patient's Body mass index is 33.5 kg/m². BMI is above normal parameters. Recommendations include: exercise counseling and nutrition counseling.      Return if symptoms worsen or fail to improve, for recheck.    David Miller MD  "

## 2019-06-17 DIAGNOSIS — F32.A DEPRESSION, UNSPECIFIED DEPRESSION TYPE: ICD-10-CM

## 2019-06-17 DIAGNOSIS — M25.511 ACUTE PAIN OF RIGHT SHOULDER: ICD-10-CM

## 2019-06-17 RX ORDER — BUSPIRONE HYDROCHLORIDE 5 MG/1
5 TABLET ORAL 3 TIMES DAILY
Qty: 90 TABLET | Refills: 5 | Status: SHIPPED | OUTPATIENT
Start: 2019-06-17 | End: 2019-11-22 | Stop reason: SDUPTHER

## 2019-06-17 RX ORDER — BUPROPION HYDROCHLORIDE 150 MG/1
150 TABLET ORAL DAILY
Qty: 30 TABLET | Refills: 5 | Status: SHIPPED | OUTPATIENT
Start: 2019-06-17 | End: 2019-11-22 | Stop reason: SDUPTHER

## 2019-06-17 RX ORDER — MELOXICAM 15 MG/1
15 TABLET ORAL DAILY
Qty: 30 TABLET | Refills: 5 | Status: SHIPPED | OUTPATIENT
Start: 2019-06-17 | End: 2019-11-22 | Stop reason: SDUPTHER

## 2019-06-17 RX ORDER — BACLOFEN 10 MG/1
10 TABLET ORAL 3 TIMES DAILY
Qty: 90 TABLET | Refills: 5 | Status: SHIPPED | OUTPATIENT
Start: 2019-06-17 | End: 2019-11-22 | Stop reason: SDUPTHER

## 2019-07-16 ENCOUNTER — TELEPHONE (OUTPATIENT)
Dept: ORTHOPEDIC SURGERY | Facility: CLINIC | Age: 34
End: 2019-07-16

## 2019-07-16 NOTE — TELEPHONE ENCOUNTER
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  This is the paragraph dIrectly out of the last office note.      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    So you can talk with Oneyad to see how to manage this legally.  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  gavin      "

## 2019-07-16 NOTE — TELEPHONE ENCOUNTER
VALERIA      Pt CALLED NEEDING A STATEMENT FROM YOU TO TAKE TO THE CHILD SUPPORT OFFICE      Pt STATED THEY ARE WANTING IT TO STATE YOU HAD HIM OFF OF RESTRICTIONS TO SEE WHAT HE COULD DO AND THEN REMOVED HIM FROM THE MINES DUE TO HIS INJURY      Pt STATED THEY ARE STILL MAKING HIM PAY CHILD SUPPORT FROM HIS INCOME FROM THE [employer]     PLEASE CALL THE Pt FOR MORE INFORMATION   # [phone]

## 2019-11-22 DIAGNOSIS — F32.A DEPRESSION, UNSPECIFIED DEPRESSION TYPE: ICD-10-CM

## 2019-11-22 DIAGNOSIS — M25.511 ACUTE PAIN OF RIGHT SHOULDER: ICD-10-CM

## 2019-11-22 RX ORDER — MELOXICAM 15 MG/1
15 TABLET ORAL DAILY
Qty: 30 TABLET | Refills: 0 | Status: SHIPPED | OUTPATIENT
Start: 2019-11-22 | End: 2020-01-24

## 2019-11-22 RX ORDER — BUPROPION HYDROCHLORIDE 150 MG/1
150 TABLET ORAL DAILY
Qty: 30 TABLET | Refills: 0 | Status: SHIPPED | OUTPATIENT
Start: 2019-11-22 | End: 2020-01-24

## 2019-11-22 RX ORDER — BACLOFEN 10 MG/1
10 TABLET ORAL 3 TIMES DAILY
Qty: 90 TABLET | Refills: 0 | Status: SHIPPED | OUTPATIENT
Start: 2019-11-22 | End: 2020-01-24

## 2019-11-22 RX ORDER — OMEPRAZOLE 40 MG/1
CAPSULE, DELAYED RELEASE ORAL
Qty: 30 CAPSULE | Refills: 0 | Status: SHIPPED | OUTPATIENT
Start: 2019-11-22 | End: 2019-12-19

## 2019-11-22 RX ORDER — LORATADINE 10 MG/1
TABLET ORAL
Qty: 30 TABLET | Refills: 0 | Status: SHIPPED | OUTPATIENT
Start: 2019-11-22 | End: 2019-12-19

## 2019-11-22 RX ORDER — BUSPIRONE HYDROCHLORIDE 5 MG/1
5 TABLET ORAL 3 TIMES DAILY
Qty: 90 TABLET | Refills: 0 | Status: SHIPPED | OUTPATIENT
Start: 2019-11-22 | End: 2020-01-24

## 2019-12-06 DIAGNOSIS — Z13.0 SCREENING FOR DEFICIENCY ANEMIA: ICD-10-CM

## 2019-12-06 DIAGNOSIS — R53.83 NO ENERGY: ICD-10-CM

## 2019-12-06 DIAGNOSIS — Z13.1 SCREENING FOR DIABETES MELLITUS: ICD-10-CM

## 2019-12-06 DIAGNOSIS — E55.9 VITAMIN D DEFICIENCY: Primary | ICD-10-CM

## 2019-12-06 DIAGNOSIS — E78.5 HYPERLIPIDEMIA, UNSPECIFIED HYPERLIPIDEMIA TYPE: ICD-10-CM

## 2019-12-06 DIAGNOSIS — Z13.29 SCREENING FOR THYROID DISORDER: ICD-10-CM

## 2019-12-19 RX ORDER — LORATADINE 10 MG/1
TABLET ORAL
Qty: 30 TABLET | Refills: 0 | Status: SHIPPED | OUTPATIENT
Start: 2019-12-19 | End: 2020-01-24

## 2019-12-19 RX ORDER — OMEPRAZOLE 40 MG/1
CAPSULE, DELAYED RELEASE ORAL
Qty: 30 CAPSULE | Refills: 0 | Status: SHIPPED | OUTPATIENT
Start: 2019-12-19 | End: 2020-01-24

## 2020-01-23 DIAGNOSIS — F32.A DEPRESSION, UNSPECIFIED DEPRESSION TYPE: ICD-10-CM

## 2020-01-23 DIAGNOSIS — M25.511 ACUTE PAIN OF RIGHT SHOULDER: ICD-10-CM

## 2020-01-24 RX ORDER — OMEPRAZOLE 40 MG/1
CAPSULE, DELAYED RELEASE ORAL
Qty: 30 CAPSULE | Refills: 0 | Status: SHIPPED | OUTPATIENT
Start: 2020-01-24 | End: 2020-04-10 | Stop reason: SDUPTHER

## 2020-01-24 RX ORDER — LORATADINE 10 MG/1
TABLET ORAL
Qty: 30 TABLET | Refills: 0 | Status: SHIPPED | OUTPATIENT
Start: 2020-01-24 | End: 2020-04-10 | Stop reason: SDUPTHER

## 2020-01-24 RX ORDER — BACLOFEN 10 MG/1
10 TABLET ORAL 3 TIMES DAILY
Qty: 90 TABLET | Refills: 0 | Status: SHIPPED | OUTPATIENT
Start: 2020-01-24 | End: 2020-04-10

## 2020-01-24 RX ORDER — MELOXICAM 15 MG/1
15 TABLET ORAL DAILY
Qty: 30 TABLET | Refills: 0 | Status: SHIPPED | OUTPATIENT
Start: 2020-01-24 | End: 2020-04-10 | Stop reason: SDUPTHER

## 2020-01-24 RX ORDER — BUSPIRONE HYDROCHLORIDE 5 MG/1
5 TABLET ORAL 3 TIMES DAILY
Qty: 90 TABLET | Refills: 0 | Status: SHIPPED | OUTPATIENT
Start: 2020-01-24 | End: 2020-04-10 | Stop reason: SDUPTHER

## 2020-01-24 RX ORDER — BUPROPION HYDROCHLORIDE 150 MG/1
150 TABLET ORAL DAILY
Qty: 30 TABLET | Refills: 0 | Status: SHIPPED | OUTPATIENT
Start: 2020-01-24 | End: 2020-04-10 | Stop reason: SDUPTHER

## 2020-01-31 ENCOUNTER — LAB (OUTPATIENT)
Dept: LAB | Facility: OTHER | Age: 35
End: 2020-01-31

## 2020-01-31 DIAGNOSIS — E55.9 VITAMIN D DEFICIENCY: ICD-10-CM

## 2020-01-31 DIAGNOSIS — Z13.1 SCREENING FOR DIABETES MELLITUS: ICD-10-CM

## 2020-01-31 DIAGNOSIS — E78.5 HYPERLIPIDEMIA, UNSPECIFIED HYPERLIPIDEMIA TYPE: ICD-10-CM

## 2020-01-31 DIAGNOSIS — Z13.0 SCREENING FOR DEFICIENCY ANEMIA: ICD-10-CM

## 2020-01-31 DIAGNOSIS — Z13.29 SCREENING FOR THYROID DISORDER: ICD-10-CM

## 2020-01-31 DIAGNOSIS — R53.83 NO ENERGY: ICD-10-CM

## 2020-01-31 LAB
ALBUMIN SERPL-MCNC: 4.5 G/DL (ref 3.5–5)
ALBUMIN/GLOB SERPL: 1.4 G/DL (ref 1.1–1.8)
ALP SERPL-CCNC: 78 U/L (ref 38–126)
ALT SERPL W P-5'-P-CCNC: 186 U/L
ANION GAP SERPL CALCULATED.3IONS-SCNC: 7 MMOL/L (ref 5–15)
AST SERPL-CCNC: 76 U/L (ref 17–59)
BASOPHILS # BLD AUTO: 0.03 10*3/MM3 (ref 0–0.2)
BASOPHILS NFR BLD AUTO: 0.3 % (ref 0–1.5)
BILIRUB SERPL-MCNC: 1 MG/DL (ref 0.2–1.3)
BUN BLD-MCNC: 16 MG/DL (ref 7–23)
BUN/CREAT SERPL: 16 (ref 7–25)
CALCIUM SPEC-SCNC: 9.5 MG/DL (ref 8.4–10.2)
CHLORIDE SERPL-SCNC: 104 MMOL/L (ref 101–112)
CHOLEST SERPL-MCNC: 206 MG/DL (ref 150–200)
CO2 SERPL-SCNC: 30 MMOL/L (ref 22–30)
CREAT BLD-MCNC: 1 MG/DL (ref 0.7–1.3)
DEPRECATED RDW RBC AUTO: 41.9 FL (ref 37–54)
EOSINOPHIL # BLD AUTO: 0.52 10*3/MM3 (ref 0–0.4)
EOSINOPHIL NFR BLD AUTO: 5.6 % (ref 0.3–6.2)
ERYTHROCYTE [DISTWIDTH] IN BLOOD BY AUTOMATED COUNT: 13.1 % (ref 12.3–15.4)
GFR SERPL CREATININE-BSD FRML MDRD: 85 ML/MIN/1.73 (ref 70–162)
GLOBULIN UR ELPH-MCNC: 3.3 GM/DL (ref 2.3–3.5)
GLUCOSE BLD-MCNC: 94 MG/DL (ref 70–99)
HCT VFR BLD AUTO: 45.1 % (ref 37.5–51)
HDLC SERPL-MCNC: 30 MG/DL (ref 40–59)
HGB BLD-MCNC: 15.8 G/DL (ref 13–17.7)
LDLC SERPL CALC-MCNC: 134 MG/DL
LDLC/HDLC SERPL: 4.48 {RATIO} (ref 0–3.55)
LYMPHOCYTES # BLD AUTO: 3.01 10*3/MM3 (ref 0.7–3.1)
LYMPHOCYTES NFR BLD AUTO: 32.5 % (ref 19.6–45.3)
MCH RBC QN AUTO: 31 PG (ref 26.6–33)
MCHC RBC AUTO-ENTMCNC: 35 G/DL (ref 31.5–35.7)
MCV RBC AUTO: 88.4 FL (ref 79–97)
MONOCYTES # BLD AUTO: 0.83 10*3/MM3 (ref 0.1–0.9)
MONOCYTES NFR BLD AUTO: 9 % (ref 5–12)
NEUTROPHILS # BLD AUTO: 4.86 10*3/MM3 (ref 1.7–7)
NEUTROPHILS NFR BLD AUTO: 52.6 % (ref 42.7–76)
PLATELET # BLD AUTO: 281 10*3/MM3 (ref 140–450)
PMV BLD AUTO: 10.5 FL (ref 6–12)
POTASSIUM BLD-SCNC: 4.4 MMOL/L (ref 3.4–5)
PROT SERPL-MCNC: 7.8 G/DL (ref 6.3–8.6)
RBC # BLD AUTO: 5.1 10*6/MM3 (ref 4.14–5.8)
SODIUM BLD-SCNC: 141 MMOL/L (ref 137–145)
TRIGL SERPL-MCNC: 208 MG/DL
VLDLC SERPL-MCNC: 41.6 MG/DL
WBC NRBC COR # BLD: 9.25 10*3/MM3 (ref 3.4–10.8)

## 2020-01-31 PROCEDURE — 80061 LIPID PANEL: CPT | Performed by: NURSE PRACTITIONER

## 2020-01-31 PROCEDURE — 84439 ASSAY OF FREE THYROXINE: CPT | Performed by: NURSE PRACTITIONER

## 2020-01-31 PROCEDURE — 82607 VITAMIN B-12: CPT | Performed by: NURSE PRACTITIONER

## 2020-01-31 PROCEDURE — 82306 VITAMIN D 25 HYDROXY: CPT | Performed by: NURSE PRACTITIONER

## 2020-01-31 PROCEDURE — 80050 GENERAL HEALTH PANEL: CPT | Performed by: NURSE PRACTITIONER

## 2020-02-01 LAB
25(OH)D3 SERPL-MCNC: 15.2 NG/ML (ref 30–100)
T4 FREE SERPL-MCNC: 1.12 NG/DL (ref 0.93–1.7)
TSH SERPL DL<=0.05 MIU/L-ACNC: 2.1 UIU/ML (ref 0.27–4.2)
VIT B12 BLD-MCNC: 582 PG/ML (ref 211–946)

## 2020-02-20 DIAGNOSIS — M25.511 ACUTE PAIN OF RIGHT SHOULDER: ICD-10-CM

## 2020-02-20 DIAGNOSIS — F32.A DEPRESSION, UNSPECIFIED DEPRESSION TYPE: ICD-10-CM

## 2020-02-20 RX ORDER — OMEPRAZOLE 40 MG/1
CAPSULE, DELAYED RELEASE ORAL
Qty: 30 CAPSULE | Refills: 0 | OUTPATIENT
Start: 2020-02-20

## 2020-02-20 RX ORDER — BACLOFEN 10 MG/1
10 TABLET ORAL 3 TIMES DAILY
Qty: 90 TABLET | Refills: 0 | OUTPATIENT
Start: 2020-02-20

## 2020-02-20 RX ORDER — BUPROPION HYDROCHLORIDE 150 MG/1
150 TABLET ORAL DAILY
Qty: 30 TABLET | Refills: 0 | OUTPATIENT
Start: 2020-02-20

## 2020-02-20 RX ORDER — BUSPIRONE HYDROCHLORIDE 5 MG/1
5 TABLET ORAL 3 TIMES DAILY
Qty: 90 TABLET | Refills: 0 | OUTPATIENT
Start: 2020-02-20

## 2020-02-20 RX ORDER — LORATADINE 10 MG/1
TABLET ORAL
Qty: 30 TABLET | Refills: 0 | OUTPATIENT
Start: 2020-02-20

## 2020-02-20 RX ORDER — MELOXICAM 15 MG/1
15 TABLET ORAL DAILY
Qty: 30 TABLET | Refills: 0 | OUTPATIENT
Start: 2020-02-20

## 2020-04-03 DIAGNOSIS — M25.511 ACUTE PAIN OF RIGHT SHOULDER: ICD-10-CM

## 2020-04-03 DIAGNOSIS — F32.A DEPRESSION, UNSPECIFIED DEPRESSION TYPE: ICD-10-CM

## 2020-04-03 RX ORDER — BUSPIRONE HYDROCHLORIDE 5 MG/1
5 TABLET ORAL 3 TIMES DAILY
Qty: 90 TABLET | Refills: 0 | OUTPATIENT
Start: 2020-04-03

## 2020-04-03 RX ORDER — OMEPRAZOLE 40 MG/1
CAPSULE, DELAYED RELEASE ORAL
Qty: 30 CAPSULE | Refills: 0 | OUTPATIENT
Start: 2020-04-03

## 2020-04-03 RX ORDER — BUPROPION HYDROCHLORIDE 150 MG/1
150 TABLET ORAL DAILY
Qty: 30 TABLET | Refills: 0 | OUTPATIENT
Start: 2020-04-03

## 2020-04-03 RX ORDER — MELOXICAM 15 MG/1
15 TABLET ORAL DAILY
Qty: 30 TABLET | Refills: 0 | OUTPATIENT
Start: 2020-04-03

## 2020-04-03 RX ORDER — BACLOFEN 10 MG/1
10 TABLET ORAL 3 TIMES DAILY
Qty: 90 TABLET | Refills: 0 | OUTPATIENT
Start: 2020-04-03

## 2020-04-03 RX ORDER — LORATADINE 10 MG/1
TABLET ORAL
Qty: 30 TABLET | Refills: 0 | OUTPATIENT
Start: 2020-04-03

## 2020-04-03 NOTE — TELEPHONE ENCOUNTER
Will need visit for refills, no advance refills b/c he has no showed for two appts, needs visit first

## 2020-04-10 ENCOUNTER — TELEMEDICINE (OUTPATIENT)
Dept: FAMILY MEDICINE CLINIC | Facility: CLINIC | Age: 35
End: 2020-04-10

## 2020-04-10 DIAGNOSIS — R20.0 NUMBNESS AND TINGLING OF RIGHT ARM: ICD-10-CM

## 2020-04-10 DIAGNOSIS — K21.9 GASTROESOPHAGEAL REFLUX DISEASE WITHOUT ESOPHAGITIS: ICD-10-CM

## 2020-04-10 DIAGNOSIS — M54.2 NECK PAIN: ICD-10-CM

## 2020-04-10 DIAGNOSIS — E55.9 VITAMIN D DEFICIENCY: Primary | ICD-10-CM

## 2020-04-10 DIAGNOSIS — F32.A DEPRESSION, UNSPECIFIED DEPRESSION TYPE: ICD-10-CM

## 2020-04-10 DIAGNOSIS — R74.8 ELEVATED LIVER ENZYMES: ICD-10-CM

## 2020-04-10 DIAGNOSIS — M25.511 ACUTE PAIN OF RIGHT SHOULDER: ICD-10-CM

## 2020-04-10 DIAGNOSIS — R20.2 NUMBNESS AND TINGLING OF RIGHT ARM: ICD-10-CM

## 2020-04-10 DIAGNOSIS — M54.12 CERVICAL RADICULOPATHY: ICD-10-CM

## 2020-04-10 DIAGNOSIS — E78.5 HYPERLIPIDEMIA, UNSPECIFIED HYPERLIPIDEMIA TYPE: ICD-10-CM

## 2020-04-10 DIAGNOSIS — R06.81 EPISODE OF APNEA: ICD-10-CM

## 2020-04-10 DIAGNOSIS — S46.011S TRAUMATIC INCOMPLETE TEAR OF RIGHT ROTATOR CUFF, SEQUELA: ICD-10-CM

## 2020-04-10 DIAGNOSIS — R06.83 SNORING: ICD-10-CM

## 2020-04-10 PROCEDURE — 99214 OFFICE O/P EST MOD 30 MIN: CPT | Performed by: NURSE PRACTITIONER

## 2020-04-10 RX ORDER — ATORVASTATIN CALCIUM 10 MG/1
10 TABLET, FILM COATED ORAL NIGHTLY
Qty: 30 TABLET | Refills: 5 | Status: SHIPPED | OUTPATIENT
Start: 2020-04-10 | End: 2021-01-08

## 2020-04-10 RX ORDER — BUPROPION HYDROCHLORIDE 150 MG/1
150 TABLET ORAL DAILY
Qty: 30 TABLET | Refills: 5 | Status: SHIPPED | OUTPATIENT
Start: 2020-04-10 | End: 2020-11-24

## 2020-04-10 RX ORDER — LORATADINE 10 MG/1
10 TABLET ORAL DAILY
Qty: 30 TABLET | Refills: 11 | Status: SHIPPED | OUTPATIENT
Start: 2020-04-10 | End: 2021-01-08 | Stop reason: SDUPTHER

## 2020-04-10 RX ORDER — OMEPRAZOLE 40 MG/1
40 CAPSULE, DELAYED RELEASE ORAL
Qty: 30 CAPSULE | Refills: 5 | Status: SHIPPED | OUTPATIENT
Start: 2020-04-10 | End: 2020-11-24

## 2020-04-10 RX ORDER — FLUTICASONE PROPIONATE 50 MCG
2 SPRAY, SUSPENSION (ML) NASAL DAILY
Qty: 16 G | Refills: 11 | Status: SHIPPED | OUTPATIENT
Start: 2020-04-10 | End: 2021-01-08 | Stop reason: SDUPTHER

## 2020-04-10 RX ORDER — MELOXICAM 15 MG/1
15 TABLET ORAL DAILY
Qty: 30 TABLET | Refills: 5 | Status: SHIPPED | OUTPATIENT
Start: 2020-04-10 | End: 2020-11-24

## 2020-04-10 RX ORDER — ERGOCALCIFEROL 1.25 MG/1
50000 CAPSULE ORAL WEEKLY
Qty: 5 CAPSULE | Refills: 5 | Status: SHIPPED | OUTPATIENT
Start: 2020-04-10 | End: 2020-11-24

## 2020-04-10 RX ORDER — BUSPIRONE HYDROCHLORIDE 5 MG/1
5 TABLET ORAL 3 TIMES DAILY
Qty: 90 TABLET | Refills: 5 | Status: SHIPPED | OUTPATIENT
Start: 2020-04-10 | End: 2020-11-24

## 2020-04-10 NOTE — PROGRESS NOTES
"Subjective   Marko Jaquez is a 34 y.o. male who presents for 6 month follow up visit via video visit due to covid-19 virus.     History of Present Illness     You have chosen to receive care through a telehealth visit.  Do you consent to use a video/audio connection for your medical care today? Yes    Lab work on 1/31/20:   Cbc wnl except esinophils elevated.   Vit d low.  Thyroid levels good.   Lipid panel: total chol 206, trig 208, hdl 30, ldl 134.   Cmp: liver enzymes elevated alt 186, ast 76.    Remind to labs after covid19.     Vitamin d deficiency-new problem, uncontrolled  -POC: will start on vit d [id number] weekly, recheck in three week.     Hyperlipidemia-chronic, uncontrolled with diet  -POC: started on atorvastatin 10mg at hs, recheck in three mtns.     Elevated liver enzymes-chronic, not improving.   -Educated to continue avoiding nsaids, increase water intake. More of a lab work up with next set of labs.     Testosterone deficiency-chronic, uncontrolled  -hx of being on testosterone 200mg/1ml injection mtnly.   -initial testosterone on 3/7/18 was 365. Pt is not taking reguarly, reports \"just forgets\". Last injection few months ago.  -4/10/20: not currently taking testosterone injections.      Anxiety/depression-chronic, controlled with wellbutrin 150mg daily and buspar 5mg tid prn. Reports increased stress due to family concerns.  But reports medications are helping and will notify office or make appointment if need be.    Possible sleep apnea/snoring-chronic, improving with cipap. Reports going to Sleep medicine in [place], now wears nasal CPAP at night.     Nasal congestion/Allergic rhinitis- acute/chronic, intermittent with weather, controlled with Flonase and claritin. .      GERD-chronic, controlled with Nexium. Patient reports acid reflux-like symptoms with spicy foods and certain triggers that will seldom wake him up at night.    Eduardo reviewed today 4/10/2020, appropriate.    F/u in " about three mtns with labs first.     Past medical history:  Pt has hx of rotator cuff of right shoulder, recent surgery, being followed by orthopedics controlled on mobic and baclofen, worker's comp incident.     No family history.     The following portions of the patient's history were reviewed and updated as appropriate: allergies, current medications, past family history, past medical history, past social history, past surgical history and problem list.    Review of Systems   Constitutional: Negative.  Negative for activity change, chills, fatigue and fever.   HENT: Positive for postnasal drip and rhinorrhea. Negative for congestion, ear discharge, ear pain, facial swelling, nosebleeds, sinus pressure, sinus pain, sneezing, sore throat, tinnitus and trouble swallowing.         Decreased air flow through left nostril     Eyes: Negative.  Negative for photophobia, pain, discharge, redness and itching.   Respiratory: Positive for apnea (Wears CPAP). Negative for cough, choking, chest tightness, shortness of breath, wheezing and stridor.    Cardiovascular: Negative.  Negative for chest pain, palpitations and leg swelling.   Gastrointestinal: Negative.  Negative for abdominal distention, abdominal pain, anal bleeding, blood in stool, constipation, diarrhea, nausea, rectal pain and vomiting.   Endocrine: Negative for cold intolerance.   Genitourinary: Negative.  Negative for difficulty urinating, dysuria, flank pain, hematuria, penile pain, testicular pain and urgency.   Musculoskeletal: Positive for arthralgias. Negative for myalgias, neck pain and neck stiffness.   Skin: Negative for color change, pallor and rash.   Allergic/Immunologic: Positive for environmental allergies.   Neurological: Negative.  Negative for dizziness, tremors, seizures, syncope, facial asymmetry, light-headedness, numbness and headaches.   Hematological: Does not bruise/bleed easily.   Psychiatric/Behavioral: Negative for agitation,  behavioral problems, confusion, decreased concentration, hallucinations, self-injury, sleep disturbance and suicidal ideas. The patient is nervous/anxious (improved with medications). The patient is not hyperactive.        Past Medical History:   Diagnosis Date   • Acute bronchitis    • Acute pharyngitis    • Backache    • Chronic low back pain    • Degenerative disc disease, lumbar    • Dysfunction of eustachian tube    • Exanthematous disorder    • Generalized abdominal pain    • GERD (gastroesophageal reflux disease)    • Headache    • Health examination of defined sub-group    • Heat exhaustion    • Influenza- like illness    • Intervertebral disc prolapse    • Low back pain     with radiculopathy   • Malaise and fatigue    • Pain in elbow    • Pneumonia    • Sciatica    • Skin sensation disturbance        History reviewed. No pertinent family history.       Objective   There were no vitals taken for this visit.  Physical assessment not completed due to covid-19 and needed video visit, pt is alert, pleasant, cooperative and oriented to person.      PHQ-2/PHQ-9 Depression Screening 5/20/2019   Little interest or pleasure in doing things 0   Feeling down, depressed, or hopeless 0   Trouble falling or staying asleep, or sleeping too much -   Feeling tired or having little energy -   Poor appetite or overeating -   Feeling bad about yourself - or that you are a failure or have let yourself or your family down -   Trouble concentrating on things, such as reading the newspaper or watching television -   Moving or speaking so slowly that other people could have noticed. Or the opposite - being so fidgety or restless that you have been moving around a lot more than usual -   Thoughts that you would be better off dead, or of hurting yourself in some way -   Total Score 0   If you checked off any problems, how difficult have these problems made it for you to do your work, take care of things at home, or get along with  other people? -         Assessment/Plan   Marko was seen today for med refill.    Diagnoses and all orders for this visit:    Vitamin D deficiency  -     vitamin D (ERGOCALCIFEROL) 1.25 MG (57406 UT) capsule capsule; Take 1 capsule by mouth 1 (One) Time Per Week.    Depression, unspecified depression type  -     busPIRone (BUSPAR) 5 MG tablet; Take 1 tablet by mouth 3 (Three) Times a Day.  -     buPROPion XL (WELLBUTRIN XL) 150 MG 24 hr tablet; Take 1 tablet by mouth Daily.    Acute pain of right shoulder  -     meloxicam (MOBIC) 15 MG tablet; Take 1 tablet by mouth Daily.    Hyperlipidemia, unspecified hyperlipidemia type  -     atorvastatin (LIPITOR) 10 MG tablet; Take 1 tablet by mouth Every Night. For cholesterol    Elevated liver enzymes    Gastroesophageal reflux disease without esophagitis  -     omeprazole (priLOSEC) 40 MG capsule; Take 1 capsule by mouth Every Morning Before Breakfast.    Episode of apnea    Snoring    Cervical radiculopathy    Neck pain    Numbness and tingling of right arm    Traumatic incomplete tear of right rotator cuff, sequela    Other orders  -     fluticasone (FLONASE) 50 MCG/ACT nasal spray; 2 sprays into the nostril(s) as directed by provider Daily.  -     loratadine (CLARITIN) 10 MG tablet; Take 1 tablet by mouth Daily.           Plan of Care:    Please See History of Present Illness(HPI) above for Plan of Care (POC) for individualized diagnoses as well as when to follow up.     Patient educated to follow-up sooner than next scheduled appointment if condition(s) worse or do not improve. Patient states understanding and is in agreeance with plan of care. An After Visit Summary was printed and given to the patient.      CLOVIS Arreola        This document has been electronically signed by CLOVIS Arreola on April 27, 2020 01:42      EMR/Transcription Dragon Disclaimer:  Some of this note may be an electronic dragon transcription/translation of spoken language to  printed text. The electronic translation of spoken language may permit erroneous, or at times, nonsensical words or phrases to be inadvertently transcribed. Although I have reviewed the note for such errors, some may still exist.

## 2020-04-27 PROBLEM — E55.9 VITAMIN D DEFICIENCY: Status: ACTIVE | Noted: 2020-04-27

## 2020-04-27 PROBLEM — R74.8 ELEVATED LIVER ENZYMES: Status: RESOLVED | Noted: 2018-11-11 | Resolved: 2020-04-27

## 2020-04-27 PROBLEM — E78.5 HYPERLIPIDEMIA: Status: ACTIVE | Noted: 2020-04-27

## 2020-11-24 DIAGNOSIS — F32.A DEPRESSION, UNSPECIFIED DEPRESSION TYPE: ICD-10-CM

## 2020-11-24 DIAGNOSIS — Z13.0 SCREENING FOR DEFICIENCY ANEMIA: ICD-10-CM

## 2020-11-24 DIAGNOSIS — E78.5 HYPERLIPIDEMIA, UNSPECIFIED HYPERLIPIDEMIA TYPE: ICD-10-CM

## 2020-11-24 DIAGNOSIS — E55.9 VITAMIN D DEFICIENCY: ICD-10-CM

## 2020-11-24 DIAGNOSIS — E55.9 VITAMIN D DEFICIENCY: Primary | ICD-10-CM

## 2020-11-24 DIAGNOSIS — M25.511 ACUTE PAIN OF RIGHT SHOULDER: ICD-10-CM

## 2020-11-24 DIAGNOSIS — Z13.1 SCREENING FOR DIABETES MELLITUS: ICD-10-CM

## 2020-11-24 DIAGNOSIS — K21.9 GASTROESOPHAGEAL REFLUX DISEASE WITHOUT ESOPHAGITIS: ICD-10-CM

## 2020-11-24 DIAGNOSIS — Z13.29 SCREENING FOR THYROID DISORDER: ICD-10-CM

## 2020-11-24 RX ORDER — BUPROPION HYDROCHLORIDE 150 MG/1
150 TABLET ORAL DAILY
Qty: 30 TABLET | Refills: 0 | Status: SHIPPED | OUTPATIENT
Start: 2020-11-24 | End: 2020-12-22

## 2020-11-24 RX ORDER — MELOXICAM 15 MG/1
15 TABLET ORAL DAILY
Qty: 30 TABLET | Refills: 0 | Status: SHIPPED | OUTPATIENT
Start: 2020-11-24 | End: 2020-12-22

## 2020-11-24 RX ORDER — BUSPIRONE HYDROCHLORIDE 5 MG/1
5 TABLET ORAL 3 TIMES DAILY
Qty: 90 TABLET | Refills: 0 | Status: SHIPPED | OUTPATIENT
Start: 2020-11-24 | End: 2020-12-22

## 2020-11-24 RX ORDER — OMEPRAZOLE 40 MG/1
40 CAPSULE, DELAYED RELEASE ORAL
Qty: 30 CAPSULE | Refills: 0 | Status: SHIPPED | OUTPATIENT
Start: 2020-11-24 | End: 2020-12-22

## 2020-11-24 RX ORDER — ERGOCALCIFEROL 1.25 MG/1
50000 CAPSULE ORAL WEEKLY
Qty: 4 CAPSULE | Refills: 0 | Status: SHIPPED | OUTPATIENT
Start: 2020-11-24 | End: 2021-01-08 | Stop reason: SDUPTHER

## 2020-12-18 ENCOUNTER — LAB (OUTPATIENT)
Dept: LAB | Facility: OTHER | Age: 35
End: 2020-12-18

## 2020-12-18 DIAGNOSIS — E78.5 HYPERLIPIDEMIA, UNSPECIFIED HYPERLIPIDEMIA TYPE: ICD-10-CM

## 2020-12-18 DIAGNOSIS — Z13.0 SCREENING FOR DEFICIENCY ANEMIA: ICD-10-CM

## 2020-12-18 DIAGNOSIS — Z13.29 SCREENING FOR THYROID DISORDER: ICD-10-CM

## 2020-12-18 DIAGNOSIS — E55.9 VITAMIN D DEFICIENCY: ICD-10-CM

## 2020-12-18 DIAGNOSIS — Z13.1 SCREENING FOR DIABETES MELLITUS: ICD-10-CM

## 2020-12-18 LAB
25(OH)D3 SERPL-MCNC: 36.1 NG/ML (ref 30–100)
ALBUMIN SERPL-MCNC: 4.2 G/DL (ref 3.5–5)
ALBUMIN/GLOB SERPL: 1.4 G/DL (ref 1.1–1.8)
ALP SERPL-CCNC: 78 U/L (ref 38–126)
ALT SERPL W P-5'-P-CCNC: 157 U/L
ANION GAP SERPL CALCULATED.3IONS-SCNC: 7 MMOL/L (ref 5–15)
AST SERPL-CCNC: 56 U/L (ref 17–59)
BASOPHILS # BLD AUTO: 0.04 10*3/MM3 (ref 0–0.2)
BASOPHILS NFR BLD AUTO: 0.5 % (ref 0–1.5)
BILIRUB SERPL-MCNC: 0.8 MG/DL (ref 0.2–1.3)
BUN SERPL-MCNC: 17 MG/DL (ref 7–23)
BUN/CREAT SERPL: 13.9 (ref 7–25)
CALCIUM SPEC-SCNC: 9.5 MG/DL (ref 8.4–10.2)
CHLORIDE SERPL-SCNC: 104 MMOL/L (ref 101–112)
CHOLEST SERPL-MCNC: 195 MG/DL (ref 150–200)
CO2 SERPL-SCNC: 27 MMOL/L (ref 22–30)
CREAT SERPL-MCNC: 1.22 MG/DL (ref 0.7–1.3)
DEPRECATED RDW RBC AUTO: 42.2 FL (ref 37–54)
EOSINOPHIL # BLD AUTO: 0.3 10*3/MM3 (ref 0–0.4)
EOSINOPHIL NFR BLD AUTO: 4 % (ref 0.3–6.2)
ERYTHROCYTE [DISTWIDTH] IN BLOOD BY AUTOMATED COUNT: 13 % (ref 12.3–15.4)
GFR SERPL CREATININE-BSD FRML MDRD: 67 ML/MIN/1.73 (ref 70–162)
GLOBULIN UR ELPH-MCNC: 2.9 GM/DL (ref 2.3–3.5)
GLUCOSE SERPL-MCNC: 104 MG/DL (ref 70–99)
HCT VFR BLD AUTO: 40.4 % (ref 37.5–51)
HDLC SERPL-MCNC: 29 MG/DL (ref 40–59)
HGB BLD-MCNC: 14.2 G/DL (ref 13–17.7)
LDLC SERPL CALC-MCNC: 137 MG/DL
LDLC/HDLC SERPL: 4.61 {RATIO} (ref 0–3.55)
LYMPHOCYTES # BLD AUTO: 2.55 10*3/MM3 (ref 0.7–3.1)
LYMPHOCYTES NFR BLD AUTO: 34.2 % (ref 19.6–45.3)
MCH RBC QN AUTO: 31.8 PG (ref 26.6–33)
MCHC RBC AUTO-ENTMCNC: 35.1 G/DL (ref 31.5–35.7)
MCV RBC AUTO: 90.6 FL (ref 79–97)
MONOCYTES # BLD AUTO: 0.56 10*3/MM3 (ref 0.1–0.9)
MONOCYTES NFR BLD AUTO: 7.5 % (ref 5–12)
NEUTROPHILS NFR BLD AUTO: 4 10*3/MM3 (ref 1.7–7)
NEUTROPHILS NFR BLD AUTO: 53.8 % (ref 42.7–76)
PLATELET # BLD AUTO: 270 10*3/MM3 (ref 140–450)
PMV BLD AUTO: 10.3 FL (ref 6–12)
POTASSIUM SERPL-SCNC: 4.5 MMOL/L (ref 3.4–5)
PROT SERPL-MCNC: 7.1 G/DL (ref 6.3–8.6)
RBC # BLD AUTO: 4.46 10*6/MM3 (ref 4.14–5.8)
SODIUM SERPL-SCNC: 138 MMOL/L (ref 137–145)
T4 FREE SERPL-MCNC: 1.02 NG/DL (ref 0.93–1.7)
TRIGL SERPL-MCNC: 161 MG/DL
TSH SERPL DL<=0.05 MIU/L-ACNC: 1.6 UIU/ML (ref 0.27–4.2)
VLDLC SERPL-MCNC: 29 MG/DL (ref 5–40)
WBC # BLD AUTO: 7.45 10*3/MM3 (ref 3.4–10.8)

## 2020-12-18 PROCEDURE — 82306 VITAMIN D 25 HYDROXY: CPT | Performed by: NURSE PRACTITIONER

## 2020-12-18 PROCEDURE — 80061 LIPID PANEL: CPT | Performed by: NURSE PRACTITIONER

## 2020-12-18 PROCEDURE — 80050 GENERAL HEALTH PANEL: CPT | Performed by: NURSE PRACTITIONER

## 2020-12-18 PROCEDURE — 84439 ASSAY OF FREE THYROXINE: CPT | Performed by: NURSE PRACTITIONER

## 2020-12-22 DIAGNOSIS — F32.A DEPRESSION, UNSPECIFIED DEPRESSION TYPE: ICD-10-CM

## 2020-12-22 DIAGNOSIS — K21.9 GASTROESOPHAGEAL REFLUX DISEASE WITHOUT ESOPHAGITIS: ICD-10-CM

## 2020-12-22 DIAGNOSIS — M25.511 ACUTE PAIN OF RIGHT SHOULDER: ICD-10-CM

## 2020-12-22 RX ORDER — BUPROPION HYDROCHLORIDE 150 MG/1
150 TABLET ORAL DAILY
Qty: 30 TABLET | Refills: 0 | Status: SHIPPED | OUTPATIENT
Start: 2020-12-22 | End: 2021-01-08 | Stop reason: SDUPTHER

## 2020-12-22 RX ORDER — MELOXICAM 15 MG/1
15 TABLET ORAL DAILY
Qty: 30 TABLET | Refills: 0 | Status: SHIPPED | OUTPATIENT
Start: 2020-12-22 | End: 2021-01-08 | Stop reason: SDUPTHER

## 2020-12-22 RX ORDER — BUSPIRONE HYDROCHLORIDE 5 MG/1
5 TABLET ORAL 3 TIMES DAILY
Qty: 90 TABLET | Refills: 0 | Status: SHIPPED | OUTPATIENT
Start: 2020-12-22 | End: 2021-01-08 | Stop reason: SDUPTHER

## 2020-12-22 RX ORDER — OMEPRAZOLE 40 MG/1
40 CAPSULE, DELAYED RELEASE ORAL
Qty: 30 CAPSULE | Refills: 0 | Status: SHIPPED | OUTPATIENT
Start: 2020-12-22 | End: 2021-01-08 | Stop reason: SDUPTHER

## 2020-12-28 DIAGNOSIS — R74.8 ELEVATED LIVER ENZYMES: Primary | ICD-10-CM

## 2020-12-30 DIAGNOSIS — R74.8 ELEVATED LIVER ENZYMES: Primary | ICD-10-CM

## 2021-01-08 ENCOUNTER — LAB (OUTPATIENT)
Dept: LAB | Facility: OTHER | Age: 36
End: 2021-01-08

## 2021-01-08 ENCOUNTER — OFFICE VISIT (OUTPATIENT)
Dept: FAMILY MEDICINE CLINIC | Facility: CLINIC | Age: 36
End: 2021-01-08

## 2021-01-08 DIAGNOSIS — R06.83 SNORING: ICD-10-CM

## 2021-01-08 DIAGNOSIS — E55.9 VITAMIN D DEFICIENCY: ICD-10-CM

## 2021-01-08 DIAGNOSIS — G89.29 CHRONIC RIGHT SHOULDER PAIN: ICD-10-CM

## 2021-01-08 DIAGNOSIS — K21.9 GASTROESOPHAGEAL REFLUX DISEASE WITHOUT ESOPHAGITIS: Primary | ICD-10-CM

## 2021-01-08 DIAGNOSIS — F32.A DEPRESSION, UNSPECIFIED DEPRESSION TYPE: ICD-10-CM

## 2021-01-08 DIAGNOSIS — G47.33 OBSTRUCTIVE SLEEP APNEA SYNDROME: ICD-10-CM

## 2021-01-08 DIAGNOSIS — J30.1 ALLERGIC RHINITIS DUE TO POLLEN, UNSPECIFIED SEASONALITY: ICD-10-CM

## 2021-01-08 DIAGNOSIS — R74.8 ELEVATED LIVER ENZYMES: ICD-10-CM

## 2021-01-08 DIAGNOSIS — E78.5 HYPERLIPIDEMIA, UNSPECIFIED HYPERLIPIDEMIA TYPE: ICD-10-CM

## 2021-01-08 DIAGNOSIS — Z72.0 VAPES NICOTINE CONTAINING SUBSTANCE: ICD-10-CM

## 2021-01-08 DIAGNOSIS — M25.511 CHRONIC RIGHT SHOULDER PAIN: ICD-10-CM

## 2021-01-08 DIAGNOSIS — F41.1 GAD (GENERALIZED ANXIETY DISORDER): ICD-10-CM

## 2021-01-08 DIAGNOSIS — R09.81 NASAL CONGESTION: ICD-10-CM

## 2021-01-08 DIAGNOSIS — Z87.891 HISTORY OF TOBACCO ABUSE: ICD-10-CM

## 2021-01-08 LAB
ALBUMIN SERPL-MCNC: 4.4 G/DL (ref 3.5–5)
ALBUMIN/GLOB SERPL: 1.5 G/DL (ref 1.1–1.8)
ALP SERPL-CCNC: 71 U/L (ref 38–126)
ALT SERPL W P-5'-P-CCNC: 140 U/L
ANION GAP SERPL CALCULATED.3IONS-SCNC: 7 MMOL/L (ref 5–15)
AST SERPL-CCNC: 69 U/L (ref 17–59)
BILIRUB CONJ SERPL-MCNC: <0 MG/DL (ref 0–0.3)
BILIRUB SERPL-MCNC: 0.9 MG/DL (ref 0.2–1.3)
BUN SERPL-MCNC: 14 MG/DL (ref 7–23)
BUN/CREAT SERPL: 11 (ref 7–25)
CALCIUM SPEC-SCNC: 9.3 MG/DL (ref 8.4–10.2)
CHLORIDE SERPL-SCNC: 103 MMOL/L (ref 101–112)
CK SERPL-CCNC: 281 U/L (ref 20–200)
CO2 SERPL-SCNC: 29 MMOL/L (ref 22–30)
CREAT SERPL-MCNC: 1.27 MG/DL (ref 0.7–1.3)
FERRITIN SERPL-MCNC: 301 NG/ML (ref 30–400)
GFR SERPL CREATININE-BSD FRML MDRD: 64 ML/MIN/1.73 (ref 70–162)
GGT SERPL-CCNC: 24 U/L (ref 8–61)
GLOBULIN UR ELPH-MCNC: 2.9 GM/DL (ref 2.3–3.5)
GLUCOSE SERPL-MCNC: 105 MG/DL (ref 70–99)
HAV IGM SERPL QL IA: NORMAL
HBV CORE IGM SERPL QL IA: NORMAL
HBV SURFACE AG SERPL QL IA: NORMAL
HCV AB SER DONR QL: NORMAL
HIV1+2 AB SER QL: NORMAL
INR PPP: 1.06 (ref 0.8–1.2)
IRON 24H UR-MRATE: 114 MCG/DL (ref 59–158)
POTASSIUM SERPL-SCNC: 4.5 MMOL/L (ref 3.4–5)
PROT SERPL-MCNC: 7.3 G/DL (ref 6.3–8.6)
PROTHROMBIN TIME: 13.8 SECONDS (ref 11.1–15.3)
SODIUM SERPL-SCNC: 139 MMOL/L (ref 137–145)

## 2021-01-08 PROCEDURE — 82977 ASSAY OF GGT: CPT | Performed by: NURSE PRACTITIONER

## 2021-01-08 PROCEDURE — 80074 ACUTE HEPATITIS PANEL: CPT | Performed by: NURSE PRACTITIONER

## 2021-01-08 PROCEDURE — 80053 COMPREHEN METABOLIC PANEL: CPT | Performed by: NURSE PRACTITIONER

## 2021-01-08 PROCEDURE — 83540 ASSAY OF IRON: CPT | Performed by: NURSE PRACTITIONER

## 2021-01-08 PROCEDURE — 99443 PR PHYS/QHP TELEPHONE EVALUATION 21-30 MIN: CPT | Performed by: NURSE PRACTITIONER

## 2021-01-08 PROCEDURE — 82248 BILIRUBIN DIRECT: CPT | Performed by: NURSE PRACTITIONER

## 2021-01-08 PROCEDURE — 82728 ASSAY OF FERRITIN: CPT | Performed by: NURSE PRACTITIONER

## 2021-01-08 PROCEDURE — 82550 ASSAY OF CK (CPK): CPT | Performed by: NURSE PRACTITIONER

## 2021-01-08 PROCEDURE — G0432 EIA HIV-1/HIV-2 SCREEN: HCPCS | Performed by: NURSE PRACTITIONER

## 2021-01-08 PROCEDURE — 85610 PROTHROMBIN TIME: CPT | Performed by: NURSE PRACTITIONER

## 2021-01-08 RX ORDER — OMEPRAZOLE 40 MG/1
40 CAPSULE, DELAYED RELEASE ORAL
Qty: 30 CAPSULE | Refills: 5 | Status: SHIPPED | OUTPATIENT
Start: 2021-01-08 | End: 2021-07-20

## 2021-01-08 RX ORDER — BUPROPION HYDROCHLORIDE 150 MG/1
150 TABLET ORAL DAILY
Qty: 30 TABLET | Refills: 5 | Status: SHIPPED | OUTPATIENT
Start: 2021-01-08 | End: 2021-07-20

## 2021-01-08 RX ORDER — ERGOCALCIFEROL 1.25 MG/1
50000 CAPSULE ORAL WEEKLY
Qty: 4 CAPSULE | Refills: 5 | Status: SHIPPED | OUTPATIENT
Start: 2021-01-08 | End: 2021-07-20

## 2021-01-08 RX ORDER — LORATADINE 10 MG/1
10 TABLET ORAL DAILY
Qty: 30 TABLET | Refills: 5 | Status: SHIPPED | OUTPATIENT
Start: 2021-01-08 | End: 2021-07-20

## 2021-01-08 RX ORDER — BUSPIRONE HYDROCHLORIDE 5 MG/1
5 TABLET ORAL 3 TIMES DAILY
Qty: 90 TABLET | Refills: 5 | Status: SHIPPED | OUTPATIENT
Start: 2021-01-08 | End: 2021-07-20

## 2021-01-08 RX ORDER — FLUTICASONE PROPIONATE 50 MCG
2 SPRAY, SUSPENSION (ML) NASAL DAILY
Qty: 16 G | Refills: 5 | Status: SHIPPED | OUTPATIENT
Start: 2021-01-08 | End: 2021-02-28

## 2021-01-08 RX ORDER — MELOXICAM 15 MG/1
15 TABLET ORAL DAILY
Qty: 30 TABLET | Refills: 5 | Status: SHIPPED | OUTPATIENT
Start: 2021-01-08 | End: 2021-07-20

## 2021-01-20 DIAGNOSIS — R74.8 ELEVATED LIVER ENZYMES: Primary | ICD-10-CM

## 2021-01-20 DIAGNOSIS — K76.0 FATTY INFILTRATION OF LIVER: ICD-10-CM

## 2021-02-28 PROBLEM — Z72.0 VAPES NICOTINE CONTAINING SUBSTANCE: Status: ACTIVE | Noted: 2021-02-28

## 2021-02-28 PROBLEM — G47.33 OBSTRUCTIVE SLEEP APNEA SYNDROME: Status: ACTIVE | Noted: 2021-02-28

## 2021-02-28 PROBLEM — F41.1 GAD (GENERALIZED ANXIETY DISORDER): Status: ACTIVE | Noted: 2021-02-28

## 2021-02-28 PROBLEM — J30.1 ALLERGIC RHINITIS DUE TO POLLEN: Status: ACTIVE | Noted: 2021-02-28

## 2021-02-28 PROBLEM — Z87.891 HISTORY OF TOBACCO ABUSE: Status: ACTIVE | Noted: 2021-02-28

## 2021-07-20 DIAGNOSIS — G89.29 CHRONIC RIGHT SHOULDER PAIN: ICD-10-CM

## 2021-07-20 DIAGNOSIS — Z13.0 SCREENING FOR DEFICIENCY ANEMIA: ICD-10-CM

## 2021-07-20 DIAGNOSIS — Z13.29 SCREENING FOR THYROID DISORDER: ICD-10-CM

## 2021-07-20 DIAGNOSIS — K21.9 GASTROESOPHAGEAL REFLUX DISEASE WITHOUT ESOPHAGITIS: ICD-10-CM

## 2021-07-20 DIAGNOSIS — E55.9 VITAMIN D DEFICIENCY: Primary | ICD-10-CM

## 2021-07-20 DIAGNOSIS — M25.511 CHRONIC RIGHT SHOULDER PAIN: ICD-10-CM

## 2021-07-20 DIAGNOSIS — Z13.1 SCREENING FOR DIABETES MELLITUS: ICD-10-CM

## 2021-07-20 DIAGNOSIS — F32.A DEPRESSION, UNSPECIFIED DEPRESSION TYPE: ICD-10-CM

## 2021-07-20 DIAGNOSIS — R09.81 NASAL CONGESTION: ICD-10-CM

## 2021-07-20 DIAGNOSIS — R74.8 ELEVATED LIVER ENZYMES: ICD-10-CM

## 2021-07-20 DIAGNOSIS — E55.9 VITAMIN D DEFICIENCY: ICD-10-CM

## 2021-07-20 DIAGNOSIS — E78.5 HYPERLIPIDEMIA, UNSPECIFIED HYPERLIPIDEMIA TYPE: ICD-10-CM

## 2021-07-20 RX ORDER — BUPROPION HYDROCHLORIDE 150 MG/1
150 TABLET ORAL DAILY
Qty: 30 TABLET | Refills: 0 | Status: SHIPPED | OUTPATIENT
Start: 2021-07-20 | End: 2021-08-19

## 2021-07-20 RX ORDER — OMEPRAZOLE 40 MG/1
40 CAPSULE, DELAYED RELEASE ORAL
Qty: 30 CAPSULE | Refills: 0 | Status: SHIPPED | OUTPATIENT
Start: 2021-07-20 | End: 2021-08-19

## 2021-07-20 RX ORDER — ERGOCALCIFEROL 1.25 MG/1
50000 CAPSULE ORAL WEEKLY
Qty: 4 CAPSULE | Refills: 0 | Status: SHIPPED | OUTPATIENT
Start: 2021-07-20 | End: 2021-08-19

## 2021-07-20 RX ORDER — BUSPIRONE HYDROCHLORIDE 5 MG/1
5 TABLET ORAL 3 TIMES DAILY
Qty: 90 TABLET | Refills: 0 | Status: SHIPPED | OUTPATIENT
Start: 2021-07-20 | End: 2021-08-19

## 2021-07-20 RX ORDER — FLUTICASONE PROPIONATE 50 MCG
SPRAY, SUSPENSION (ML) NASAL
Qty: 16 G | Refills: 0 | Status: SHIPPED | OUTPATIENT
Start: 2021-07-20 | End: 2021-08-19

## 2021-07-20 RX ORDER — MELOXICAM 15 MG/1
15 TABLET ORAL DAILY
Qty: 30 TABLET | Refills: 0 | Status: SHIPPED | OUTPATIENT
Start: 2021-07-20 | End: 2021-08-19

## 2021-07-20 RX ORDER — LORATADINE 10 MG/1
10 TABLET ORAL DAILY
Qty: 30 TABLET | Refills: 0 | Status: SHIPPED | OUTPATIENT
Start: 2021-07-20 | End: 2021-08-19

## 2021-08-17 DIAGNOSIS — K21.9 GASTROESOPHAGEAL REFLUX DISEASE WITHOUT ESOPHAGITIS: ICD-10-CM

## 2021-08-17 DIAGNOSIS — M25.511 CHRONIC RIGHT SHOULDER PAIN: ICD-10-CM

## 2021-08-17 DIAGNOSIS — R09.81 NASAL CONGESTION: ICD-10-CM

## 2021-08-17 DIAGNOSIS — F32.A DEPRESSION, UNSPECIFIED DEPRESSION TYPE: ICD-10-CM

## 2021-08-17 DIAGNOSIS — G89.29 CHRONIC RIGHT SHOULDER PAIN: ICD-10-CM

## 2021-08-17 DIAGNOSIS — E55.9 VITAMIN D DEFICIENCY: ICD-10-CM

## 2021-08-19 RX ORDER — MELOXICAM 15 MG/1
15 TABLET ORAL DAILY
Qty: 30 TABLET | Refills: 0 | Status: SHIPPED | OUTPATIENT
Start: 2021-08-19 | End: 2021-09-30 | Stop reason: SDUPTHER

## 2021-08-19 RX ORDER — LORATADINE 10 MG/1
10 TABLET ORAL DAILY
Qty: 30 TABLET | Refills: 0 | Status: SHIPPED | OUTPATIENT
Start: 2021-08-19 | End: 2021-09-30 | Stop reason: SDUPTHER

## 2021-08-19 RX ORDER — FLUTICASONE PROPIONATE 50 MCG
SPRAY, SUSPENSION (ML) NASAL
Qty: 16 G | Refills: 0 | Status: SHIPPED | OUTPATIENT
Start: 2021-08-19 | End: 2021-09-30 | Stop reason: SDUPTHER

## 2021-08-19 RX ORDER — OMEPRAZOLE 40 MG/1
40 CAPSULE, DELAYED RELEASE ORAL
Qty: 30 CAPSULE | Refills: 0 | Status: SHIPPED | OUTPATIENT
Start: 2021-08-19 | End: 2021-09-30 | Stop reason: SDUPTHER

## 2021-08-19 RX ORDER — BUSPIRONE HYDROCHLORIDE 5 MG/1
5 TABLET ORAL 3 TIMES DAILY
Qty: 90 TABLET | Refills: 0 | Status: SHIPPED | OUTPATIENT
Start: 2021-08-19 | End: 2021-09-30 | Stop reason: SDUPTHER

## 2021-08-19 RX ORDER — ERGOCALCIFEROL 1.25 MG/1
50000 CAPSULE ORAL WEEKLY
Qty: 4 CAPSULE | Refills: 0 | Status: SHIPPED | OUTPATIENT
Start: 2021-08-19 | End: 2021-09-30 | Stop reason: SDUPTHER

## 2021-08-19 RX ORDER — BUPROPION HYDROCHLORIDE 150 MG/1
150 TABLET ORAL DAILY
Qty: 30 TABLET | Refills: 0 | Status: SHIPPED | OUTPATIENT
Start: 2021-08-19 | End: 2021-09-30 | Stop reason: SDUPTHER

## 2021-08-19 NOTE — TELEPHONE ENCOUNTER
Rx Refill Note  Requested Prescriptions     Pending Prescriptions Disp Refills   • buPROPion XL (WELLBUTRIN XL) 150 MG 24 hr tablet [Pharmacy Med Name: buPROPion HCL ER (XL) 150 M 150 Tablet] 30 tablet 0     Sig: TAKE 1 TABLET BY MOUTH DAILY.   • busPIRone (BUSPAR) 5 MG tablet [Pharmacy Med Name: busPIRone HCL 5 MG TABS 5 Tablet] 90 tablet 0     Sig: TAKE 1 TABLET BY MOUTH 3 (THREE) TIMES A DAY.   • fluticasone (FLONASE) 50 MCG/ACT nasal spray [Pharmacy Med Name: FLUTICASONE PROP 50 MCG SPR 50 DEWEY] 16 g 0     Sig: USE 2 SPRAYS IN EACH NOSTRIL DAILY AS DIRECTED   • loratadine (CLARITIN) 10 MG tablet [Pharmacy Med Name: LORATADINE 10 MG TABS 10 Tablet] 30 tablet 0     Sig: TAKE 1 TABLET BY MOUTH DAILY.   • meloxicam (MOBIC) 15 MG tablet [Pharmacy Med Name: MELOXICAM 15 MG TAB 15 Tablet] 30 tablet 0     Sig: TAKE 1 TABLET BY MOUTH DAILY.   • omeprazole (priLOSEC) 40 MG capsule [Pharmacy Med Name: OMEPRAZOLE DR 40 MG CAPSULE 40 Capsule] 30 capsule 0     Sig: TAKE 1 CAPSULE BY MOUTH EVERY MORNING BEFORE BREAKFAST.   • vitamin D (ERGOCALCIFEROL) 1.25 MG (42528 UT) capsule capsule [Pharmacy Med Name: VIT D2 1.25 MG (50,000 UNIT 1.25 MG Capsule] 4 capsule 0     Sig: TAKE 1 CAPSULE BY MOUTH 1 (ONE) TIME PER WEEK.      Last office visit with prescribing clinician: 1/8/2021      Next office visit with prescribing clinician: Visit date not found            Heri Martinez LPN  08/19/21, 09:39 CDT

## 2021-09-16 DIAGNOSIS — R09.81 NASAL CONGESTION: ICD-10-CM

## 2021-09-16 DIAGNOSIS — G89.29 CHRONIC RIGHT SHOULDER PAIN: ICD-10-CM

## 2021-09-16 DIAGNOSIS — M25.511 CHRONIC RIGHT SHOULDER PAIN: ICD-10-CM

## 2021-09-16 DIAGNOSIS — K21.9 GASTROESOPHAGEAL REFLUX DISEASE WITHOUT ESOPHAGITIS: ICD-10-CM

## 2021-09-16 DIAGNOSIS — E55.9 VITAMIN D DEFICIENCY: ICD-10-CM

## 2021-09-16 DIAGNOSIS — F32.A DEPRESSION, UNSPECIFIED DEPRESSION TYPE: ICD-10-CM

## 2021-09-20 RX ORDER — OMEPRAZOLE 40 MG/1
40 CAPSULE, DELAYED RELEASE ORAL
Qty: 30 CAPSULE | Refills: 0 | OUTPATIENT
Start: 2021-09-20

## 2021-09-20 RX ORDER — FLUTICASONE PROPIONATE 50 MCG
SPRAY, SUSPENSION (ML) NASAL
Qty: 16 G | Refills: 0 | OUTPATIENT
Start: 2021-09-20

## 2021-09-20 RX ORDER — LORATADINE 10 MG/1
10 TABLET ORAL DAILY
Qty: 30 TABLET | Refills: 0 | OUTPATIENT
Start: 2021-09-20

## 2021-09-20 RX ORDER — BUSPIRONE HYDROCHLORIDE 5 MG/1
5 TABLET ORAL 3 TIMES DAILY
Qty: 90 TABLET | Refills: 0 | OUTPATIENT
Start: 2021-09-20

## 2021-09-20 RX ORDER — ERGOCALCIFEROL 1.25 MG/1
50000 CAPSULE ORAL WEEKLY
Qty: 4 CAPSULE | Refills: 0 | OUTPATIENT
Start: 2021-09-20

## 2021-09-20 RX ORDER — MELOXICAM 15 MG/1
15 TABLET ORAL DAILY
Qty: 30 TABLET | Refills: 0 | OUTPATIENT
Start: 2021-09-20

## 2021-09-20 RX ORDER — BUPROPION HYDROCHLORIDE 150 MG/1
150 TABLET ORAL DAILY
Qty: 30 TABLET | Refills: 0 | OUTPATIENT
Start: 2021-09-20

## 2021-09-20 NOTE — TELEPHONE ENCOUNTER
Rx Refill Note  Requested Prescriptions     Pending Prescriptions Disp Refills   • vitamin D (ERGOCALCIFEROL) 1.25 MG (45343 UT) capsule capsule [Pharmacy Med Name: VIT D2 1.25 MG (50,000 UNIT 1.25 MG Capsule] 4 capsule 0     Sig: TAKE 1 CAPSULE BY MOUTH 1 (ONE) TIME PER WEEK.   • omeprazole (priLOSEC) 40 MG capsule [Pharmacy Med Name: OMEPRAZOLE DR 40 MG CAPSULE 40 Capsule] 30 capsule 0     Sig: TAKE 1 CAPSULE BY MOUTH EVERY MORNING BEFORE BREAKFAST.   • meloxicam (MOBIC) 15 MG tablet [Pharmacy Med Name: MELOXICAM 15 MG TAB 15 Tablet] 30 tablet 0     Sig: TAKE 1 TABLET BY MOUTH DAILY.   • loratadine (CLARITIN) 10 MG tablet [Pharmacy Med Name: LORATADINE 10 MG TABS 10 Tablet] 30 tablet 0     Sig: TAKE 1 TABLET BY MOUTH DAILY.   • fluticasone (FLONASE) 50 MCG/ACT nasal spray [Pharmacy Med Name: FLUTICASONE PROP 50 MCG SPR 50 DEWEY] 16 g 0     Sig: USE 2 SPRAYS IN EACH NOSTRIL DAILY AS DIRECTED   • buPROPion XL (WELLBUTRIN XL) 150 MG 24 hr tablet [Pharmacy Med Name: buPROPion HCL ER (XL) 150 M 150 Tablet] 30 tablet 0     Sig: TAKE 1 TABLET BY MOUTH DAILY.   • busPIRone (BUSPAR) 5 MG tablet [Pharmacy Med Name: busPIRone HCL 5 MG TABS 5 Tablet] 90 tablet 0     Sig: TAKE 1 TABLET BY MOUTH 3 (THREE) TIMES A DAY.      Last office visit with prescribing clinician: Visit date not found      Next office visit with prescribing clinician: Visit date not found            Heri Martinez LPN  09/20/21, 07:39 CDT

## 2021-09-30 DIAGNOSIS — M25.511 CHRONIC RIGHT SHOULDER PAIN: ICD-10-CM

## 2021-09-30 DIAGNOSIS — K21.9 GASTROESOPHAGEAL REFLUX DISEASE WITHOUT ESOPHAGITIS: ICD-10-CM

## 2021-09-30 DIAGNOSIS — R09.81 NASAL CONGESTION: ICD-10-CM

## 2021-09-30 DIAGNOSIS — G89.29 CHRONIC RIGHT SHOULDER PAIN: ICD-10-CM

## 2021-09-30 DIAGNOSIS — E55.9 VITAMIN D DEFICIENCY: ICD-10-CM

## 2021-09-30 DIAGNOSIS — F32.A DEPRESSION, UNSPECIFIED DEPRESSION TYPE: ICD-10-CM

## 2021-09-30 RX ORDER — FLUTICASONE PROPIONATE 50 MCG
2 SPRAY, SUSPENSION (ML) NASAL DAILY
Qty: 16 G | Refills: 0 | Status: SHIPPED | OUTPATIENT
Start: 2021-09-30 | End: 2021-10-21

## 2021-09-30 RX ORDER — MELOXICAM 15 MG/1
15 TABLET ORAL DAILY
Qty: 30 TABLET | Refills: 0 | Status: SHIPPED | OUTPATIENT
Start: 2021-09-30 | End: 2021-10-21

## 2021-09-30 RX ORDER — ERGOCALCIFEROL 1.25 MG/1
50000 CAPSULE ORAL WEEKLY
Qty: 4 CAPSULE | Refills: 0 | Status: SHIPPED | OUTPATIENT
Start: 2021-09-30 | End: 2021-10-21

## 2021-09-30 RX ORDER — BUPROPION HYDROCHLORIDE 150 MG/1
150 TABLET ORAL DAILY
Qty: 30 TABLET | Refills: 0 | Status: SHIPPED | OUTPATIENT
Start: 2021-09-30 | End: 2021-10-21

## 2021-09-30 RX ORDER — BUSPIRONE HYDROCHLORIDE 5 MG/1
5 TABLET ORAL 3 TIMES DAILY
Qty: 90 TABLET | Refills: 0 | Status: SHIPPED | OUTPATIENT
Start: 2021-09-30 | End: 2021-10-21

## 2021-09-30 RX ORDER — OMEPRAZOLE 40 MG/1
40 CAPSULE, DELAYED RELEASE ORAL
Qty: 30 CAPSULE | Refills: 0 | Status: SHIPPED | OUTPATIENT
Start: 2021-09-30 | End: 2021-10-21

## 2021-09-30 RX ORDER — LORATADINE 10 MG/1
10 TABLET ORAL DAILY
Qty: 30 TABLET | Refills: 0 | Status: SHIPPED | OUTPATIENT
Start: 2021-09-30 | End: 2021-10-21

## 2021-10-20 DIAGNOSIS — E55.9 VITAMIN D DEFICIENCY: ICD-10-CM

## 2021-10-20 DIAGNOSIS — K21.9 GASTROESOPHAGEAL REFLUX DISEASE WITHOUT ESOPHAGITIS: ICD-10-CM

## 2021-10-20 DIAGNOSIS — G89.29 CHRONIC RIGHT SHOULDER PAIN: ICD-10-CM

## 2021-10-20 DIAGNOSIS — F32.A DEPRESSION, UNSPECIFIED DEPRESSION TYPE: ICD-10-CM

## 2021-10-20 DIAGNOSIS — M25.511 CHRONIC RIGHT SHOULDER PAIN: ICD-10-CM

## 2021-10-20 DIAGNOSIS — R09.81 NASAL CONGESTION: ICD-10-CM

## 2021-10-21 RX ORDER — BUPROPION HYDROCHLORIDE 150 MG/1
150 TABLET ORAL DAILY
Qty: 14 TABLET | Refills: 0 | Status: SHIPPED | OUTPATIENT
Start: 2021-10-21 | End: 2021-11-04 | Stop reason: SDUPTHER

## 2021-10-21 RX ORDER — MELOXICAM 15 MG/1
15 TABLET ORAL DAILY
Qty: 14 TABLET | Refills: 0 | Status: SHIPPED | OUTPATIENT
Start: 2021-10-21 | End: 2021-11-04 | Stop reason: SDUPTHER

## 2021-10-21 RX ORDER — LORATADINE 10 MG/1
10 TABLET ORAL DAILY
Qty: 14 TABLET | Refills: 0 | Status: SHIPPED | OUTPATIENT
Start: 2021-10-21 | End: 2021-12-27

## 2021-10-21 RX ORDER — ERGOCALCIFEROL 1.25 MG/1
50000 CAPSULE ORAL WEEKLY
Qty: 4 CAPSULE | Refills: 0 | Status: SHIPPED | OUTPATIENT
Start: 2021-10-21 | End: 2021-11-04 | Stop reason: SDUPTHER

## 2021-10-21 RX ORDER — FLUTICASONE PROPIONATE 50 MCG
SPRAY, SUSPENSION (ML) NASAL
Qty: 16 G | Refills: 0 | Status: SHIPPED | OUTPATIENT
Start: 2021-10-21

## 2021-10-21 RX ORDER — OMEPRAZOLE 40 MG/1
40 CAPSULE, DELAYED RELEASE ORAL
Qty: 14 CAPSULE | Refills: 0 | Status: SHIPPED | OUTPATIENT
Start: 2021-10-21 | End: 2021-11-04 | Stop reason: SDUPTHER

## 2021-10-21 RX ORDER — BUSPIRONE HYDROCHLORIDE 5 MG/1
5 TABLET ORAL 3 TIMES DAILY
Qty: 42 TABLET | Refills: 0 | Status: SHIPPED | OUTPATIENT
Start: 2021-10-21 | End: 2021-11-04 | Stop reason: SDUPTHER

## 2021-11-04 ENCOUNTER — OFFICE VISIT (OUTPATIENT)
Dept: FAMILY MEDICINE CLINIC | Facility: CLINIC | Age: 36
End: 2021-11-04

## 2021-11-04 VITALS
HEART RATE: 99 BPM | HEIGHT: 73 IN | SYSTOLIC BLOOD PRESSURE: 126 MMHG | BODY MASS INDEX: 35.39 KG/M2 | DIASTOLIC BLOOD PRESSURE: 82 MMHG | OXYGEN SATURATION: 96 % | WEIGHT: 267 LBS

## 2021-11-04 DIAGNOSIS — R74.8 ELEVATED LIVER ENZYMES: ICD-10-CM

## 2021-11-04 DIAGNOSIS — M25.511 CHRONIC RIGHT SHOULDER PAIN: ICD-10-CM

## 2021-11-04 DIAGNOSIS — E55.9 VITAMIN D DEFICIENCY: ICD-10-CM

## 2021-11-04 DIAGNOSIS — F32.A DEPRESSION, UNSPECIFIED DEPRESSION TYPE: ICD-10-CM

## 2021-11-04 DIAGNOSIS — E78.5 HYPERLIPIDEMIA, UNSPECIFIED HYPERLIPIDEMIA TYPE: ICD-10-CM

## 2021-11-04 DIAGNOSIS — Z00.00 ANNUAL PHYSICAL EXAM: ICD-10-CM

## 2021-11-04 DIAGNOSIS — K76.0 FATTY LIVER: ICD-10-CM

## 2021-11-04 DIAGNOSIS — Z76.89 ENCOUNTER TO ESTABLISH CARE: Primary | ICD-10-CM

## 2021-11-04 DIAGNOSIS — K21.9 GASTROESOPHAGEAL REFLUX DISEASE WITHOUT ESOPHAGITIS: ICD-10-CM

## 2021-11-04 DIAGNOSIS — G89.29 CHRONIC RIGHT SHOULDER PAIN: ICD-10-CM

## 2021-11-04 PROCEDURE — 3008F BODY MASS INDEX DOCD: CPT | Performed by: NURSE PRACTITIONER

## 2021-11-04 PROCEDURE — 2014F MENTAL STATUS ASSESS: CPT | Performed by: NURSE PRACTITIONER

## 2021-11-04 PROCEDURE — 99395 PREV VISIT EST AGE 18-39: CPT | Performed by: NURSE PRACTITIONER

## 2021-11-04 RX ORDER — BUPROPION HYDROCHLORIDE 150 MG/1
150 TABLET ORAL DAILY
Qty: 30 TABLET | Refills: 5 | Status: SHIPPED | OUTPATIENT
Start: 2021-11-04 | End: 2022-04-29

## 2021-11-04 RX ORDER — OMEPRAZOLE 40 MG/1
40 CAPSULE, DELAYED RELEASE ORAL
Qty: 30 CAPSULE | Refills: 5 | Status: SHIPPED | OUTPATIENT
Start: 2021-11-04 | End: 2022-04-29

## 2021-11-04 RX ORDER — BUSPIRONE HYDROCHLORIDE 5 MG/1
5 TABLET ORAL 3 TIMES DAILY
Qty: 90 TABLET | Refills: 5 | Status: SHIPPED | OUTPATIENT
Start: 2021-11-04 | End: 2022-04-29

## 2021-11-04 RX ORDER — MELOXICAM 15 MG/1
15 TABLET ORAL DAILY
Qty: 30 TABLET | Refills: 5 | Status: SHIPPED | OUTPATIENT
Start: 2021-11-04 | End: 2022-04-29

## 2021-11-04 RX ORDER — ERGOCALCIFEROL 1.25 MG/1
50000 CAPSULE ORAL WEEKLY
Qty: 4 CAPSULE | Refills: 5 | Status: SHIPPED | OUTPATIENT
Start: 2021-11-04 | End: 2022-04-29

## 2021-11-04 NOTE — PROGRESS NOTES
CC: Establish Care      Subjective:  Marko Jaquez is a 35 y.o. male who presents for     Patient in to establish care today.  Formerly a patient of CLOVIS Swift    [facility]: Depression/anxiety, GERD, vitamin D deficiency, elevated liver enzymes, fatty liver, hyperlipidemia, CAROLYN not currently on CPAP, chronic right shoulder pain this has improved, and previous smoker.    Pt is due for check up with labs.  Needing refills on medications today.    Has no plans on getting the Covid vaccine. Declines update in immunizations today.       The following portions of the patient's history were reviewed and updated as appropriate: allergies, current medications, past family history, past medical history, past social history, past surgical history and problem list.    Past Medical History:   Diagnosis Date   • Acute bronchitis    • Acute pharyngitis    • Backache    • Chronic low back pain    • Degenerative disc disease, lumbar    • Dysfunction of eustachian tube    • Exanthematous disorder    • Generalized abdominal pain    • GERD (gastroesophageal reflux disease)    • Headache    • Health examination of defined sub-group    • Heat exhaustion    • Influenza- like illness    • Intervertebral disc prolapse    • Low back pain     with radiculopathy   • Malaise and fatigue    • Pain in elbow    • Pneumonia    • Sciatica    • Skin sensation disturbance          Current Outpatient Medications:   •  buPROPion XL (WELLBUTRIN XL) 150 MG 24 hr tablet, Take 1 tablet by mouth Daily., Disp: 30 tablet, Rfl: 5  •  busPIRone (BUSPAR) 5 MG tablet, Take 1 tablet by mouth 3 (Three) Times a Day., Disp: 90 tablet, Rfl: 5  •  fluticasone (FLONASE) 50 MCG/ACT nasal spray, USE 2 SPRAYS IN EACH NOSTRIL DAILY AS DIRECTED, Disp: 16 g, Rfl: 0  •  loratadine (CLARITIN) 10 MG tablet, TAKE 1 TABLET BY MOUTH DAILY., Disp: 14 tablet, Rfl: 0  •  meloxicam (MOBIC) 15 MG tablet, Take 1 tablet by mouth Daily., Disp: 30 tablet, Rfl: 5  •  omeprazole (priLOSEC) 40  "MG capsule, Take 1 capsule by mouth Every Morning Before Breakfast., Disp: 30 capsule, Rfl: 5  •  vitamin D (ERGOCALCIFEROL) 1.25 MG (71566 UT) capsule capsule, Take 1 capsule by mouth 1 (One) Time Per Week., Disp: 4 capsule, Rfl: 5    Review of Systems    Review of Systems   Constitutional: Negative.    HENT: Negative.    Eyes: Negative.    Respiratory: Negative.    Cardiovascular: Negative.    Gastrointestinal: Negative.    Endocrine: Negative.    Genitourinary: Negative.    Musculoskeletal: Negative.    Skin: Negative.    Allergic/Immunologic: Negative.    Neurological: Negative.    Hematological: Negative.    Psychiatric/Behavioral: Negative.    All other systems reviewed and are negative.      Objective  Vitals:    11/04/21 1524   BP: 126/82   Pulse: 99   SpO2: 96%   Weight: 121 kg (267 lb)   Height: 185.4 cm (73\")     Body mass index is 35.23 kg/m².    Physical Exam    Physical Exam  Vitals and nursing note reviewed.   Constitutional:       General: He is not in acute distress.     Appearance: Normal appearance. He is well-developed. He is not ill-appearing, toxic-appearing or diaphoretic.   HENT:      Head: Normocephalic and atraumatic.      Right Ear: External ear normal.      Left Ear: External ear normal.   Eyes:      General: No scleral icterus.        Right eye: No discharge.         Left eye: No discharge.      Extraocular Movements: Extraocular movements intact.      Conjunctiva/sclera: Conjunctivae normal.   Neck:      Vascular: No carotid bruit.   Cardiovascular:      Rate and Rhythm: Normal rate and regular rhythm.      Pulses: Normal pulses.      Heart sounds: Normal heart sounds. No murmur heard.  No friction rub. No gallop.    Pulmonary:      Effort: Pulmonary effort is normal. No respiratory distress.      Breath sounds: Normal breath sounds. No stridor. No wheezing, rhonchi or rales.   Chest:      Chest wall: No tenderness.   Abdominal:      General: Bowel sounds are normal. There is no " distension.      Palpations: Abdomen is soft. There is no mass.      Tenderness: There is no abdominal tenderness. There is no guarding or rebound.      Hernia: No hernia is present.   Musculoskeletal:      Cervical back: Normal range of motion and neck supple. No rigidity or tenderness.      Right lower leg: No edema.      Left lower leg: No edema.   Lymphadenopathy:      Cervical: No cervical adenopathy.   Skin:     General: Skin is warm and dry.      Capillary Refill: Capillary refill takes less than 2 seconds.      Findings: No rash.   Neurological:      General: No focal deficit present.      Mental Status: He is alert and oriented to person, place, and time. Mental status is at baseline.   Psychiatric:         Mood and Affect: Mood normal.         Behavior: Behavior normal.         Thought Content: Thought content normal.         Judgment: Judgment normal.             Diagnoses and all orders for this visit:    1. Encounter to establish care (Primary)    2. Annual physical exam  -     CBC w AUTO Differential; Future  -     Comprehensive metabolic panel; Future  -     TSH  -     Lipid Panel With LDL/HDL Ratio; Future  -     Hemoglobin A1c    3. Depression, unspecified depression type  -     buPROPion XL (WELLBUTRIN XL) 150 MG 24 hr tablet; Take 1 tablet by mouth Daily.  Dispense: 30 tablet; Refill: 5  -     busPIRone (BUSPAR) 5 MG tablet; Take 1 tablet by mouth 3 (Three) Times a Day.  Dispense: 90 tablet; Refill: 5    4. Chronic right shoulder pain  -     meloxicam (MOBIC) 15 MG tablet; Take 1 tablet by mouth Daily.  Dispense: 30 tablet; Refill: 5    5. Gastroesophageal reflux disease without esophagitis  -     omeprazole (priLOSEC) 40 MG capsule; Take 1 capsule by mouth Every Morning Before Breakfast.  Dispense: 30 capsule; Refill: 5    6. Vitamin D deficiency  -     vitamin D (ERGOCALCIFEROL) 1.25 MG (20332 UT) capsule capsule; Take 1 capsule by mouth 1 (One) Time Per Week.  Dispense: 4 capsule; Refill: 5  -      Vitamin D 25 Hydroxy; Future    7. Hyperlipidemia, unspecified hyperlipidemia type  -     Lipid Panel With LDL/HDL Ratio; Future    8. Fatty liver  -     Comprehensive metabolic panel; Future    9. Elevated liver enzymes  -     Comprehensive metabolic panel; Future    Patient establish care today.  Annual physical exam performed.  We will check routine labs with a CBC, CMP, TSH, lipid panel, and hemoglobin A1c.  We will also check a vitamin D level.  Patient to return at a later date fasting for these labs.  Will inform patient of the results once they are available.  Refilled patient's medications as above.  Discussed because of the fatty liver and elevated liver enzymes to cut out all alcohol and Tylenol.  Patient states he does not consume alcohol or Tylenol.  Patient declined updated immunizations today with a pneumonia shot, Tdap, or flu shot.  Patient states he has no intentions of getting the COVID-19 vaccine.  Patient to follow-up in 6 months or sooner if needed.  Answered all questions.  Patient verbalized understanding of plan of care.            This document has been electronically signed by CLOVIS Mckeon on November 4, 2021 15:43 CDT

## 2021-11-19 ENCOUNTER — LAB (OUTPATIENT)
Dept: LAB | Facility: OTHER | Age: 36
End: 2021-11-19

## 2021-11-19 DIAGNOSIS — E78.5 HYPERLIPIDEMIA, UNSPECIFIED HYPERLIPIDEMIA TYPE: ICD-10-CM

## 2021-11-19 DIAGNOSIS — R74.8 ELEVATED LIVER ENZYMES: ICD-10-CM

## 2021-11-19 DIAGNOSIS — E55.9 VITAMIN D DEFICIENCY: ICD-10-CM

## 2021-11-19 DIAGNOSIS — Z00.00 ANNUAL PHYSICAL EXAM: ICD-10-CM

## 2021-11-19 DIAGNOSIS — K76.0 FATTY LIVER: ICD-10-CM

## 2021-11-19 LAB
25(OH)D3 SERPL-MCNC: 40 NG/ML
ALBUMIN SERPL-MCNC: 4.4 G/DL (ref 3.5–5)
ALBUMIN/GLOB SERPL: 1.5 G/DL (ref 1.1–1.8)
ALP SERPL-CCNC: 73 U/L (ref 38–126)
ALT SERPL W P-5'-P-CCNC: 130 U/L
ANION GAP SERPL CALCULATED.3IONS-SCNC: 6 MMOL/L (ref 5–15)
AST SERPL-CCNC: 53 U/L (ref 17–59)
BASOPHILS # BLD AUTO: 0.05 10*3/MM3 (ref 0–0.2)
BASOPHILS NFR BLD AUTO: 0.7 % (ref 0–1.5)
BILIRUB SERPL-MCNC: 0.7 MG/DL (ref 0.2–1.3)
BUN SERPL-MCNC: 20 MG/DL (ref 7–23)
BUN/CREAT SERPL: 16.4 (ref 7–25)
CALCIUM SPEC-SCNC: 9.4 MG/DL (ref 8.4–10.2)
CHLORIDE SERPL-SCNC: 105 MMOL/L (ref 101–112)
CHOLEST SERPL-MCNC: 194 MG/DL (ref 0–200)
CO2 SERPL-SCNC: 29 MMOL/L (ref 22–30)
CREAT SERPL-MCNC: 1.22 MG/DL (ref 0.7–1.3)
DEPRECATED RDW RBC AUTO: 43.8 FL (ref 37–54)
EOSINOPHIL # BLD AUTO: 0.33 10*3/MM3 (ref 0–0.4)
EOSINOPHIL NFR BLD AUTO: 4.7 % (ref 0.3–6.2)
ERYTHROCYTE [DISTWIDTH] IN BLOOD BY AUTOMATED COUNT: 13.2 % (ref 12.3–15.4)
GFR SERPL CREATININE-BSD FRML MDRD: 67 ML/MIN/1.73 (ref 70–162)
GLOBULIN UR ELPH-MCNC: 3 GM/DL (ref 2.3–3.5)
GLUCOSE SERPL-MCNC: 103 MG/DL (ref 70–99)
HBA1C MFR BLD: 5.4 % (ref 4.8–5.6)
HCT VFR BLD AUTO: 42.3 % (ref 37.5–51)
HDLC SERPL-MCNC: 35 MG/DL (ref 40–60)
HGB BLD-MCNC: 14.4 G/DL (ref 13–17.7)
LDLC SERPL CALC-MCNC: 134 MG/DL (ref 0–100)
LDLC/HDLC SERPL: 3.74 {RATIO}
LYMPHOCYTES # BLD AUTO: 3.18 10*3/MM3 (ref 0.7–3.1)
LYMPHOCYTES NFR BLD AUTO: 45 % (ref 19.6–45.3)
MCH RBC QN AUTO: 31.2 PG (ref 26.6–33)
MCHC RBC AUTO-ENTMCNC: 34 G/DL (ref 31.5–35.7)
MCV RBC AUTO: 91.8 FL (ref 79–97)
MONOCYTES # BLD AUTO: 0.63 10*3/MM3 (ref 0.1–0.9)
MONOCYTES NFR BLD AUTO: 8.9 % (ref 5–12)
NEUTROPHILS NFR BLD AUTO: 2.88 10*3/MM3 (ref 1.7–7)
NEUTROPHILS NFR BLD AUTO: 40.7 % (ref 42.7–76)
PLATELET # BLD AUTO: 276 10*3/MM3 (ref 140–450)
PMV BLD AUTO: 10.1 FL (ref 6–12)
POTASSIUM SERPL-SCNC: 4.6 MMOL/L (ref 3.4–5)
PROT SERPL-MCNC: 7.4 G/DL (ref 6.3–8.6)
RBC # BLD AUTO: 4.61 10*6/MM3 (ref 4.14–5.8)
SODIUM SERPL-SCNC: 140 MMOL/L (ref 137–145)
TRIGL SERPL-MCNC: 140 MG/DL (ref 0–150)
TSH SERPL DL<=0.05 MIU/L-ACNC: 1.42 UIU/ML (ref 0.27–4.2)
VLDLC SERPL-MCNC: 25 MG/DL (ref 5–40)
WBC NRBC COR # BLD: 7.07 10*3/MM3 (ref 3.4–10.8)

## 2021-11-19 PROCEDURE — 80061 LIPID PANEL: CPT | Performed by: NURSE PRACTITIONER

## 2021-11-19 PROCEDURE — 83036 HEMOGLOBIN GLYCOSYLATED A1C: CPT | Performed by: NURSE PRACTITIONER

## 2021-11-19 PROCEDURE — 82306 VITAMIN D 25 HYDROXY: CPT | Performed by: NURSE PRACTITIONER

## 2021-11-19 PROCEDURE — 80050 GENERAL HEALTH PANEL: CPT | Performed by: NURSE PRACTITIONER

## 2021-12-27 DIAGNOSIS — R09.81 NASAL CONGESTION: ICD-10-CM

## 2021-12-27 RX ORDER — LORATADINE 10 MG/1
10 TABLET ORAL DAILY
Qty: 30 TABLET | Refills: 1 | Status: SHIPPED | OUTPATIENT
Start: 2021-12-27 | End: 2022-02-21

## 2022-02-21 DIAGNOSIS — R09.81 NASAL CONGESTION: ICD-10-CM

## 2022-02-21 RX ORDER — LORATADINE 10 MG/1
10 TABLET ORAL DAILY
Qty: 30 TABLET | Refills: 1 | Status: SHIPPED | OUTPATIENT
Start: 2022-02-21 | End: 2022-11-28 | Stop reason: SDUPTHER

## 2022-04-28 DIAGNOSIS — M25.511 CHRONIC RIGHT SHOULDER PAIN: ICD-10-CM

## 2022-04-28 DIAGNOSIS — E55.9 VITAMIN D DEFICIENCY: ICD-10-CM

## 2022-04-28 DIAGNOSIS — K21.9 GASTROESOPHAGEAL REFLUX DISEASE WITHOUT ESOPHAGITIS: ICD-10-CM

## 2022-04-28 DIAGNOSIS — G89.29 CHRONIC RIGHT SHOULDER PAIN: ICD-10-CM

## 2022-04-28 DIAGNOSIS — F32.A DEPRESSION, UNSPECIFIED DEPRESSION TYPE: ICD-10-CM

## 2022-04-29 RX ORDER — OMEPRAZOLE 40 MG/1
40 CAPSULE, DELAYED RELEASE ORAL
Qty: 30 CAPSULE | Refills: 0 | Status: SHIPPED | OUTPATIENT
Start: 2022-04-29 | End: 2022-05-24 | Stop reason: SDUPTHER

## 2022-04-29 RX ORDER — BUPROPION HYDROCHLORIDE 150 MG/1
150 TABLET ORAL DAILY
Qty: 30 TABLET | Refills: 0 | Status: SHIPPED | OUTPATIENT
Start: 2022-04-29 | End: 2022-05-24 | Stop reason: SDUPTHER

## 2022-04-29 RX ORDER — BUSPIRONE HYDROCHLORIDE 5 MG/1
5 TABLET ORAL 3 TIMES DAILY
Qty: 90 TABLET | Refills: 0 | Status: SHIPPED | OUTPATIENT
Start: 2022-04-29 | End: 2022-05-24 | Stop reason: SDUPTHER

## 2022-04-29 RX ORDER — ERGOCALCIFEROL 1.25 MG/1
50000 CAPSULE ORAL WEEKLY
Qty: 4 CAPSULE | Refills: 0 | Status: SHIPPED | OUTPATIENT
Start: 2022-04-29 | End: 2022-05-24 | Stop reason: SDUPTHER

## 2022-04-29 RX ORDER — MELOXICAM 15 MG/1
15 TABLET ORAL DAILY
Qty: 30 TABLET | Refills: 0 | Status: SHIPPED | OUTPATIENT
Start: 2022-04-29 | End: 2022-05-24 | Stop reason: SDUPTHER

## 2022-05-24 ENCOUNTER — OFFICE VISIT (OUTPATIENT)
Dept: FAMILY MEDICINE CLINIC | Facility: CLINIC | Age: 37
End: 2022-05-24

## 2022-05-24 VITALS
SYSTOLIC BLOOD PRESSURE: 124 MMHG | OXYGEN SATURATION: 96 % | HEIGHT: 73 IN | DIASTOLIC BLOOD PRESSURE: 82 MMHG | HEART RATE: 74 BPM | WEIGHT: 269.4 LBS | BODY MASS INDEX: 35.7 KG/M2

## 2022-05-24 DIAGNOSIS — G89.29 CHRONIC RIGHT SHOULDER PAIN: Chronic | ICD-10-CM

## 2022-05-24 DIAGNOSIS — F32.A DEPRESSION, UNSPECIFIED DEPRESSION TYPE: Primary | Chronic | ICD-10-CM

## 2022-05-24 DIAGNOSIS — K21.9 GASTROESOPHAGEAL REFLUX DISEASE WITHOUT ESOPHAGITIS: Chronic | ICD-10-CM

## 2022-05-24 DIAGNOSIS — M25.511 CHRONIC RIGHT SHOULDER PAIN: Chronic | ICD-10-CM

## 2022-05-24 DIAGNOSIS — F41.9 ANXIETY: Chronic | ICD-10-CM

## 2022-05-24 DIAGNOSIS — E55.9 VITAMIN D DEFICIENCY: Chronic | ICD-10-CM

## 2022-05-24 PROCEDURE — 99214 OFFICE O/P EST MOD 30 MIN: CPT | Performed by: NURSE PRACTITIONER

## 2022-05-24 RX ORDER — BUPROPION HYDROCHLORIDE 150 MG/1
150 TABLET ORAL DAILY
Qty: 30 TABLET | Refills: 5 | Status: SHIPPED | OUTPATIENT
Start: 2022-05-24 | End: 2022-12-05

## 2022-05-24 RX ORDER — MELOXICAM 15 MG/1
15 TABLET ORAL DAILY
Qty: 30 TABLET | Refills: 5 | Status: SHIPPED | OUTPATIENT
Start: 2022-05-24 | End: 2022-12-05

## 2022-05-24 RX ORDER — OMEPRAZOLE 40 MG/1
40 CAPSULE, DELAYED RELEASE ORAL
Qty: 30 CAPSULE | Refills: 5 | Status: SHIPPED | OUTPATIENT
Start: 2022-05-24 | End: 2022-12-05

## 2022-05-24 RX ORDER — BUSPIRONE HYDROCHLORIDE 5 MG/1
5 TABLET ORAL 3 TIMES DAILY
Qty: 90 TABLET | Refills: 5 | Status: SHIPPED | OUTPATIENT
Start: 2022-05-24 | End: 2022-12-05

## 2022-05-24 RX ORDER — ERGOCALCIFEROL 1.25 MG/1
50000 CAPSULE ORAL WEEKLY
Qty: 4 CAPSULE | Refills: 5 | Status: SHIPPED | OUTPATIENT
Start: 2022-05-24 | End: 2022-12-05

## 2022-05-24 NOTE — PROGRESS NOTES
CC: Med Refill      Subjective:  Marko Jaquez is a 36 y.o. male who presents for     Patient presents to office for 6-month follow-up on medical conditions and medication refill.  He has depression and anxiety in which he takes BuSpar and Wellbutrin for.  His symptoms are well controlled on these medications.  Has vitamin D deficiency in which he takes vitamin D for.  He has chronic right shoulder pain in which he takes Mobic for.  He has GERD and he takes omeprazole for.  This does help with his symptoms.  Patient has no complaints today.      The following portions of the patient's history were reviewed and updated as appropriate: allergies, current medications, past family history, past medical history, past social history, past surgical history and problem list.    Past Medical History:   Diagnosis Date   • Acute bronchitis    • Acute pharyngitis    • Backache    • Chronic low back pain    • Degenerative disc disease, lumbar    • Dysfunction of eustachian tube    • Exanthematous disorder    • Generalized abdominal pain    • GERD (gastroesophageal reflux disease)    • Headache    • Health examination of defined sub-group    • Heat exhaustion    • Influenza- like illness    • Intervertebral disc prolapse    • Low back pain     with radiculopathy   • Malaise and fatigue    • Pain in elbow    • Pneumonia    • Sciatica    • Skin sensation disturbance          Current Outpatient Medications:   •  buPROPion XL (WELLBUTRIN XL) 150 MG 24 hr tablet, Take 1 tablet by mouth Daily., Disp: 30 tablet, Rfl: 5  •  busPIRone (BUSPAR) 5 MG tablet, Take 1 tablet by mouth 3 (Three) Times a Day., Disp: 90 tablet, Rfl: 5  •  fluticasone (FLONASE) 50 MCG/ACT nasal spray, USE 2 SPRAYS IN EACH NOSTRIL DAILY AS DIRECTED, Disp: 16 g, Rfl: 0  •  loratadine (CLARITIN) 10 MG tablet, TAKE 1 TABLET BY MOUTH DAILY., Disp: 30 tablet, Rfl: 1  •  meloxicam (MOBIC) 15 MG tablet, Take 1 tablet by mouth Daily., Disp: 30 tablet, Rfl: 5  •   "omeprazole (priLOSEC) 40 MG capsule, Take 1 capsule by mouth Every Morning Before Breakfast., Disp: 30 capsule, Rfl: 5  •  vitamin D (ERGOCALCIFEROL) 1.25 MG (39961 UT) capsule capsule, Take 1 capsule by mouth 1 (One) Time Per Week., Disp: 4 capsule, Rfl: 5    Review of Systems    Review of Systems   Constitutional: Negative.    HENT: Negative.    Eyes: Negative.    Respiratory: Negative.    Cardiovascular: Negative.    Gastrointestinal: Negative.    Endocrine: Negative.    Genitourinary: Negative.    Musculoskeletal: Positive for arthralgias.   Skin: Negative.    Allergic/Immunologic: Negative.    Neurological: Negative.    Hematological: Negative.    Psychiatric/Behavioral: Negative.    All other systems reviewed and are negative.      Objective  Vitals:    05/24/22 0823   BP: 124/82   Pulse: 74   SpO2: 96%   Weight: 122 kg (269 lb 6.4 oz)   Height: 185.4 cm (73\")     Body mass index is 35.54 kg/m².    Physical Exam    Physical Exam  Vitals and nursing note reviewed.   Constitutional:       General: He is not in acute distress.     Appearance: Normal appearance. He is not ill-appearing, toxic-appearing or diaphoretic.   HENT:      Head: Normocephalic and atraumatic.   Cardiovascular:      Rate and Rhythm: Normal rate and regular rhythm.      Pulses: Normal pulses.      Heart sounds: Normal heart sounds. No murmur heard.    No friction rub. No gallop.   Pulmonary:      Effort: Pulmonary effort is normal. No respiratory distress.      Breath sounds: Normal breath sounds. No stridor. No wheezing, rhonchi or rales.   Chest:      Chest wall: No tenderness.   Musculoskeletal:      Cervical back: Normal range of motion and neck supple.   Neurological:      Mental Status: He is alert.             Diagnoses and all orders for this visit:    1. Depression, unspecified depression type (Primary)  -     buPROPion XL (WELLBUTRIN XL) 150 MG 24 hr tablet; Take 1 tablet by mouth Daily.  Dispense: 30 tablet; Refill: 5  -     " busPIRone (BUSPAR) 5 MG tablet; Take 1 tablet by mouth 3 (Three) Times a Day.  Dispense: 90 tablet; Refill: 5    2. Chronic right shoulder pain  -     meloxicam (MOBIC) 15 MG tablet; Take 1 tablet by mouth Daily.  Dispense: 30 tablet; Refill: 5    3. Gastroesophageal reflux disease without esophagitis  -     omeprazole (priLOSEC) 40 MG capsule; Take 1 capsule by mouth Every Morning Before Breakfast.  Dispense: 30 capsule; Refill: 5    4. Vitamin D deficiency  -     vitamin D (ERGOCALCIFEROL) 1.25 MG (28199 UT) capsule capsule; Take 1 capsule by mouth 1 (One) Time Per Week.  Dispense: 4 capsule; Refill: 5    5. Anxiety  -     busPIRone (BUSPAR) 5 MG tablet; Take 1 tablet by mouth 3 (Three) Times a Day.  Dispense: 90 tablet; Refill: 5       Patient in for 6-month follow-up on medical conditions.  Refilled medications as requested.  Patient to continue current medications.  Patient to follow-up in 6 months or sooner if needed.  At that time we will perform a complete exam with labs.  Answered all questions.  Patient verbalized understanding of plan of care.          This document has been electronically signed by CLOVIS Mckeon on May 24, 2022 08:38 CDT

## 2022-11-28 ENCOUNTER — OFFICE VISIT (OUTPATIENT)
Dept: FAMILY MEDICINE CLINIC | Facility: CLINIC | Age: 37
End: 2022-11-28

## 2022-11-28 VITALS
SYSTOLIC BLOOD PRESSURE: 128 MMHG | HEIGHT: 73 IN | BODY MASS INDEX: 36.98 KG/M2 | WEIGHT: 279 LBS | OXYGEN SATURATION: 98 % | DIASTOLIC BLOOD PRESSURE: 86 MMHG | HEART RATE: 82 BPM

## 2022-11-28 DIAGNOSIS — E55.9 VITAMIN D DEFICIENCY: Chronic | ICD-10-CM

## 2022-11-28 DIAGNOSIS — F33.42 MAJOR DEPRESSIVE DISORDER, RECURRENT, IN FULL REMISSION: Chronic | ICD-10-CM

## 2022-11-28 DIAGNOSIS — R09.81 NASAL CONGESTION: ICD-10-CM

## 2022-11-28 DIAGNOSIS — G89.29 CHRONIC RIGHT SHOULDER PAIN: Chronic | ICD-10-CM

## 2022-11-28 DIAGNOSIS — F41.9 ANXIETY: Chronic | ICD-10-CM

## 2022-11-28 DIAGNOSIS — M25.511 CHRONIC RIGHT SHOULDER PAIN: Chronic | ICD-10-CM

## 2022-11-28 DIAGNOSIS — K21.9 GASTROESOPHAGEAL REFLUX DISEASE WITHOUT ESOPHAGITIS: Chronic | ICD-10-CM

## 2022-11-28 DIAGNOSIS — Z00.00 ANNUAL PHYSICAL EXAM: Primary | ICD-10-CM

## 2022-11-28 PROCEDURE — 99395 PREV VISIT EST AGE 18-39: CPT | Performed by: NURSE PRACTITIONER

## 2022-11-28 PROCEDURE — 2014F MENTAL STATUS ASSESS: CPT | Performed by: NURSE PRACTITIONER

## 2022-11-28 PROCEDURE — 3008F BODY MASS INDEX DOCD: CPT | Performed by: NURSE PRACTITIONER

## 2022-11-28 RX ORDER — LORATADINE 10 MG/1
10 TABLET ORAL DAILY
Qty: 30 TABLET | Refills: 5 | Status: SHIPPED | OUTPATIENT
Start: 2022-11-28

## 2022-11-28 NOTE — PROGRESS NOTES
CC: Follow-up (6 month FU )      Subjective:  Marko Jaquez is a 36 y.o. male who presents for         Patient presents to office for annual physical exam with labs.  He has depression and anxiety in which he takes Wellbutrin and BuSpar for.  His depression is in complete remission with medication.  He has vitamin D deficiency in which he takes vitamin D replacement therapy for.  He has chronic right shoulder pain in which he takes Mobic for.  He has GERD in which he takes omeprazole for.  Symptoms are well controlled with this medication.  is having sinus issues.  was on Loratadine, but it got d/c'd for some reason.  needs this refilled.     He is due for Covid, Tdap, and flu shots today. He declines these today.      The following portions of the patient's history were reviewed and updated as appropriate: allergies, current medications, past family history, past medical history, past social history, past surgical history and problem list.    Past Medical History:   Diagnosis Date   • Acute bronchitis    • Acute pharyngitis    • Backache    • Chronic low back pain    • Degenerative disc disease, lumbar    • Dysfunction of eustachian tube    • Exanthematous disorder    • Generalized abdominal pain    • GERD (gastroesophageal reflux disease)    • Headache    • Health examination of defined sub-group    • Heat exhaustion    • Influenza- like illness    • Intervertebral disc prolapse    • Low back pain     with radiculopathy   • Malaise and fatigue    • Pain in elbow    • Pneumonia    • Sciatica    • Skin sensation disturbance          Current Outpatient Medications:   •  buPROPion XL (WELLBUTRIN XL) 150 MG 24 hr tablet, Take 1 tablet by mouth Daily., Disp: 30 tablet, Rfl: 5  •  busPIRone (BUSPAR) 5 MG tablet, Take 1 tablet by mouth 3 (Three) Times a Day., Disp: 90 tablet, Rfl: 5  •  fluticasone (FLONASE) 50 MCG/ACT nasal spray, USE 2 SPRAYS IN EACH NOSTRIL DAILY AS DIRECTED, Disp: 16 g, Rfl: 0  •  " loratadine (CLARITIN) 10 MG tablet, Take 1 tablet by mouth Daily., Disp: 30 tablet, Rfl: 5  •  meloxicam (MOBIC) 15 MG tablet, Take 1 tablet by mouth Daily., Disp: 30 tablet, Rfl: 5  •  omeprazole (priLOSEC) 40 MG capsule, Take 1 capsule by mouth Every Morning Before Breakfast., Disp: 30 capsule, Rfl: 5  •  vitamin D (ERGOCALCIFEROL) 1.25 MG (19752 UT) capsule capsule, Take 1 capsule by mouth 1 (One) Time Per Week., Disp: 4 capsule, Rfl: 5    Review of Systems    Review of Systems   Constitutional: Negative.    HENT: Positive for congestion and postnasal drip.    Eyes: Negative.    Respiratory: Negative.    Cardiovascular: Negative.    Gastrointestinal: Negative.    Endocrine: Negative.    Genitourinary: Negative.    Musculoskeletal: Positive for arthralgias.   Skin: Negative.    Allergic/Immunologic: Negative.    Neurological: Negative.    Hematological: Negative.    Psychiatric/Behavioral: Negative.    All other systems reviewed and are negative.      Objective  Vitals:    11/28/22 0839   BP: 128/86   Pulse: 82   SpO2: 98%   Weight: 127 kg (279 lb)   Height: 185.4 cm (73\")     Body mass index is 36.81 kg/m².    Physical Exam    Physical Exam  Vitals and nursing note reviewed.   Constitutional:       General: He is not in acute distress.     Appearance: Normal appearance. He is not ill-appearing, toxic-appearing or diaphoretic.   HENT:      Head: Normocephalic and atraumatic.   Eyes:      General: No scleral icterus.        Right eye: No discharge.         Left eye: No discharge.      Extraocular Movements: Extraocular movements intact.      Conjunctiva/sclera: Conjunctivae normal.   Neck:      Vascular: No carotid bruit.   Cardiovascular:      Rate and Rhythm: Normal rate and regular rhythm.      Pulses: Normal pulses.      Heart sounds: Normal heart sounds. No murmur heard.    No friction rub. No gallop.   Pulmonary:      Effort: Pulmonary effort is normal. No respiratory distress.      Breath sounds: Normal " breath sounds. No stridor. No wheezing, rhonchi or rales.   Chest:      Chest wall: No tenderness.   Abdominal:      General: Bowel sounds are normal. There is no distension.      Palpations: Abdomen is soft. There is no mass.      Tenderness: There is no abdominal tenderness. There is no guarding or rebound.      Hernia: No hernia is present.   Musculoskeletal:      Cervical back: Normal range of motion and neck supple. No rigidity or tenderness.      Right lower leg: No edema.      Left lower leg: No edema.   Lymphadenopathy:      Cervical: No cervical adenopathy.   Skin:     General: Skin is warm and dry.      Capillary Refill: Capillary refill takes less than 2 seconds.      Findings: No rash.   Neurological:      General: No focal deficit present.      Mental Status: He is alert and oriented to person, place, and time. Mental status is at baseline.   Psychiatric:         Attention and Perception: Attention and perception normal.         Mood and Affect: Mood and affect normal.         Speech: Speech normal.         Behavior: Behavior normal. Behavior is cooperative.         Thought Content: Thought content normal.         Cognition and Memory: Cognition and memory normal.         Judgment: Judgment normal.      Comments: Well-kempt, good eye contact, answers questions appropriately.             Diagnoses and all orders for this visit:    1. Annual physical exam (Primary)  -     CBC w AUTO Differential; Future  -     Comprehensive metabolic panel; Future  -     TSH  -     Lipid Panel With LDL/HDL Ratio; Future  -     Hemoglobin A1c    2. Major depressive disorder, recurrent, in full remission (HCC)    3. Anxiety    4. Vitamin D deficiency  -     Vitamin D 25 Hydroxy; Future    5. Chronic right shoulder pain    6. Gastroesophageal reflux disease without esophagitis    7. Nasal congestion  -     loratadine (CLARITIN) 10 MG tablet; Take 1 tablet by mouth Daily.  Dispense: 30 tablet; Refill: 5         Patient in for  annual physical exam with labs.  He will return fasting to have routine labs drawn as above.  Will notify patient of the results once available.  He is counseled on needed immunizations today.  He declines update and COVID, Tdap, and flu shots today.  Refilled Claritin as requested.  He states he does not need refills on other medications today. Class 2 Severe Obesity (BMI >=35 and <=39.9). Obesity-related health conditions include the following: GERD. Obesity is worsening. BMI is is above average; BMI management plan is completed. We discussed low calorie, low carb based diet program, portion control and increasing exercise.  Patient to follow-up in 6 months or sooner if needed for recheck on medical conditions.  Answered all questions.  Patient verbalized understanding of plan of care..        This document has been electronically signed by CLOVIS Mckeon on November 28, 2022 09:02 CST

## 2022-12-01 DIAGNOSIS — E55.9 VITAMIN D DEFICIENCY: Chronic | ICD-10-CM

## 2022-12-01 DIAGNOSIS — F32.A DEPRESSION, UNSPECIFIED DEPRESSION TYPE: Chronic | ICD-10-CM

## 2022-12-01 DIAGNOSIS — F41.9 ANXIETY: Chronic | ICD-10-CM

## 2022-12-01 DIAGNOSIS — M25.511 CHRONIC RIGHT SHOULDER PAIN: Chronic | ICD-10-CM

## 2022-12-01 DIAGNOSIS — G89.29 CHRONIC RIGHT SHOULDER PAIN: Chronic | ICD-10-CM

## 2022-12-01 DIAGNOSIS — K21.9 GASTROESOPHAGEAL REFLUX DISEASE WITHOUT ESOPHAGITIS: Chronic | ICD-10-CM

## 2022-12-02 ENCOUNTER — LAB (OUTPATIENT)
Dept: LAB | Facility: OTHER | Age: 37
End: 2022-12-02

## 2022-12-02 DIAGNOSIS — E55.9 VITAMIN D DEFICIENCY: Chronic | ICD-10-CM

## 2022-12-02 DIAGNOSIS — Z00.00 ANNUAL PHYSICAL EXAM: ICD-10-CM

## 2022-12-02 LAB
25(OH)D3 SERPL-MCNC: 39.2 NG/ML (ref 30–100)
ALBUMIN SERPL-MCNC: 4.5 G/DL (ref 3.5–5)
ALBUMIN/GLOB SERPL: 1.6 G/DL (ref 1.1–1.8)
ALP SERPL-CCNC: 70 U/L (ref 38–126)
ALT SERPL W P-5'-P-CCNC: 176 U/L
ANION GAP SERPL CALCULATED.3IONS-SCNC: 7 MMOL/L (ref 5–15)
ANISOCYTOSIS BLD QL: NORMAL
AST SERPL-CCNC: 76 U/L (ref 17–59)
BILIRUB SERPL-MCNC: 0.7 MG/DL (ref 0.2–1.3)
BUN SERPL-MCNC: 16 MG/DL (ref 7–23)
BUN/CREAT SERPL: 13.8 (ref 7–25)
CALCIUM SPEC-SCNC: 9.1 MG/DL (ref 8.4–10.2)
CHLORIDE SERPL-SCNC: 104 MMOL/L (ref 101–112)
CHOLEST SERPL-MCNC: 209 MG/DL (ref 0–200)
CO2 SERPL-SCNC: 28 MMOL/L (ref 22–30)
CREAT SERPL-MCNC: 1.16 MG/DL (ref 0.7–1.3)
DEPRECATED RDW RBC AUTO: 41 FL (ref 37–54)
EGFRCR SERPLBLD CKD-EPI 2021: 82.2 ML/MIN/1.73
EOSINOPHIL # BLD MANUAL: 0.35 10*3/MM3 (ref 0–0.4)
EOSINOPHIL NFR BLD MANUAL: 5 % (ref 0.3–6.2)
ERYTHROCYTE [DISTWIDTH] IN BLOOD BY AUTOMATED COUNT: 13.3 % (ref 12.3–15.4)
GLOBULIN UR ELPH-MCNC: 2.9 GM/DL (ref 2.3–3.5)
GLUCOSE SERPL-MCNC: 97 MG/DL (ref 70–99)
HBA1C MFR BLD: 5.6 % (ref 4.8–5.6)
HCT VFR BLD AUTO: 42.2 % (ref 37.5–51)
HDLC SERPL-MCNC: 33 MG/DL (ref 40–60)
HGB BLD-MCNC: 15.7 G/DL (ref 13–17.7)
LDLC SERPL CALC-MCNC: 144 MG/DL (ref 0–100)
LDLC/HDLC SERPL: 4.27 {RATIO}
LYMPHOCYTES # BLD MANUAL: 2.09 10*3/MM3 (ref 0.7–3.1)
LYMPHOCYTES NFR BLD MANUAL: 10 % (ref 5–12)
MCH RBC QN AUTO: 32.2 PG (ref 26.6–33)
MCHC RBC AUTO-ENTMCNC: 37.2 G/DL (ref 31.5–35.7)
MCV RBC AUTO: 86.7 FL (ref 79–97)
MONOCYTES # BLD: 0.7 10*3/MM3 (ref 0.1–0.9)
NEUTROPHILS # BLD AUTO: 3.83 10*3/MM3 (ref 1.7–7)
NEUTROPHILS NFR BLD MANUAL: 55 % (ref 42.7–76)
PLATELET # BLD AUTO: 244 10*3/MM3 (ref 140–450)
PMV BLD AUTO: 10.3 FL (ref 6–12)
POTASSIUM SERPL-SCNC: 4.6 MMOL/L (ref 3.4–5)
PROT SERPL-MCNC: 7.4 G/DL (ref 6.3–8.6)
RBC # BLD AUTO: 4.87 10*6/MM3 (ref 4.14–5.8)
SCAN SLIDE: NORMAL
SMALL PLATELETS BLD QL SMEAR: ADEQUATE
SODIUM SERPL-SCNC: 139 MMOL/L (ref 137–145)
TRIGL SERPL-MCNC: 176 MG/DL (ref 0–150)
TSH SERPL DL<=0.05 MIU/L-ACNC: 2.41 UIU/ML (ref 0.27–4.2)
VARIANT LYMPHS NFR BLD MANUAL: 30 % (ref 19.6–45.3)
VLDLC SERPL-MCNC: 32 MG/DL (ref 5–40)
WBC MORPH BLD: NORMAL
WBC NRBC COR # BLD: 6.97 10*3/MM3 (ref 3.4–10.8)

## 2022-12-02 PROCEDURE — 80050 GENERAL HEALTH PANEL: CPT | Performed by: NURSE PRACTITIONER

## 2022-12-02 PROCEDURE — 83036 HEMOGLOBIN GLYCOSYLATED A1C: CPT | Performed by: NURSE PRACTITIONER

## 2022-12-02 PROCEDURE — 82306 VITAMIN D 25 HYDROXY: CPT | Performed by: NURSE PRACTITIONER

## 2022-12-02 PROCEDURE — 85007 BL SMEAR W/DIFF WBC COUNT: CPT | Performed by: NURSE PRACTITIONER

## 2022-12-02 PROCEDURE — 80061 LIPID PANEL: CPT | Performed by: NURSE PRACTITIONER

## 2022-12-05 DIAGNOSIS — R74.8 ELEVATED LIVER ENZYMES: Primary | ICD-10-CM

## 2022-12-05 RX ORDER — MELOXICAM 15 MG/1
15 TABLET ORAL DAILY
Qty: 30 TABLET | Refills: 5 | Status: SHIPPED | OUTPATIENT
Start: 2022-12-05

## 2022-12-05 RX ORDER — BUPROPION HYDROCHLORIDE 150 MG/1
150 TABLET ORAL DAILY
Qty: 30 TABLET | Refills: 5 | Status: SHIPPED | OUTPATIENT
Start: 2022-12-05

## 2022-12-05 RX ORDER — OMEPRAZOLE 40 MG/1
40 CAPSULE, DELAYED RELEASE ORAL
Qty: 30 CAPSULE | Refills: 5 | Status: SHIPPED | OUTPATIENT
Start: 2022-12-05

## 2022-12-05 RX ORDER — ERGOCALCIFEROL 1.25 MG/1
50000 CAPSULE ORAL WEEKLY
Qty: 4 CAPSULE | Refills: 5 | Status: SHIPPED | OUTPATIENT
Start: 2022-12-05

## 2022-12-05 RX ORDER — BUSPIRONE HYDROCHLORIDE 5 MG/1
5 TABLET ORAL 3 TIMES DAILY
Qty: 90 TABLET | Refills: 5 | Status: SHIPPED | OUTPATIENT
Start: 2022-12-05

## 2022-12-06 RX ORDER — EZETIMIBE 10 MG/1
10 TABLET ORAL DAILY
Qty: 30 TABLET | Refills: 5 | Status: SHIPPED | OUTPATIENT
Start: 2022-12-06

## 2022-12-09 ENCOUNTER — OFFICE VISIT (OUTPATIENT)
Dept: GASTROENTEROLOGY | Facility: CLINIC | Age: 37
End: 2022-12-09

## 2022-12-09 VITALS
DIASTOLIC BLOOD PRESSURE: 82 MMHG | BODY MASS INDEX: 36.84 KG/M2 | HEIGHT: 73 IN | SYSTOLIC BLOOD PRESSURE: 130 MMHG | HEART RATE: 81 BPM | WEIGHT: 278 LBS

## 2022-12-09 DIAGNOSIS — R74.8 ELEVATED LIVER ENZYMES: Primary | ICD-10-CM

## 2022-12-09 PROCEDURE — 99213 OFFICE O/P EST LOW 20 MIN: CPT | Performed by: PHYSICIAN ASSISTANT

## 2022-12-09 NOTE — PROGRESS NOTES
[facility]      Chief Complaint:   Chief Complaint   Patient presents with   • Elevated Hepatic Enzymes       Subjective     HPI:   Patient presents for evaluation due to elevated liver enzymes.  Patient states he has been told he had fatty liver in the past.  Patient states his previous PCP monitored his enzymes every 6 months.  He states that he lost some weight last spring but eventually gained all of that weight plus a few pounds back.  Patient states he has lost a couple of pounds but states it has been very difficult.  Patient denies any family history of known liver disease and family history of diabetes.  Patient denies drug use and admits to seldom alcohol usage.  Previously had negative hepatitis testing and denies any high risk behaviors.  In review of medications, patient states he has been on Mobic for 6 to 7 years and Wellbutrin for a few years.  Patient just started Zetia for his cholesterol because he was unable to tolerate statins.    Laboratory values from 12-22- glucose 97, creatinine 1.16, total protein 7.4, albumin 4.5, , AST 76, alkaline phosphatase 70, total bilirubin 0.7, TSH 2.4, total cholesterol 209, triglycerides 176, HDL 33, , hemoglobin A1c 5.6, WBC count 6.97, hemoglobin 15.7, hematocrit 42.2, platelet count 244,000.  Liver ultrasound from 2021 shows fatty metamorphosis of the liver.    Assessment/plan: Elevated LFTs- patient will have laboratory studies and ultrasound completed for further evaluation of his elevated liver enzymes.  Elevation likely due to fatty liver but may also be related to medications.  Discussed lifestyle modification, weight loss, lower carbohydrate diet and increasing physical activity with him.  Patient voices understanding.  He will follow-up after laboratory studies and ultrasound have been completed.    Past Medical History:   Past Medical History:   Diagnosis Date   • Acute bronchitis    • Acute  pharyngitis    • Backache    • Chronic low back pain    • Degenerative disc disease, lumbar    • Dysfunction of eustachian tube    • Exanthematous disorder    • Generalized abdominal pain    • GERD (gastroesophageal reflux disease)    • Headache    • Health examination of defined sub-group    • Heat exhaustion    • Influenza- like illness    • Intervertebral disc prolapse    • Low back pain     with radiculopathy   • Malaise and fatigue    • Pain in elbow    • Pneumonia    • Sciatica    • Skin sensation disturbance        Past Surgical History:  Past Surgical History:   Procedure Laterality Date   • BACK SURGERY     • INJECTION OF MEDICATION      Depo Medrol (80mg)   • INJECTION OF MEDICATION      Dexamethasone (5mg)   • INJECTION OF MEDICATION      Kenalog (40mg)   • INJECTION OF MEDICATION      Rocephin (1 gm)   • INJECTION OF MEDICATION      Toradol (60mg)   • SHOULDER ARTHROSCOPY Right 8/10/2018    Procedure: SHOULDER ARTHROSCOPY, DANE procedure, and Subacromial decompression;  Surgeon: David Miller MD;  Location: [facility];  Service: Orthopedics       Family History:  History reviewed. No pertinent family history.    Social History:   reports that he has been smoking cigarettes. He has been smoking an average of 1 pack per day. He uses smokeless tobacco. He reports current alcohol use. He reports that he does not use drugs.    Medications:   Prior to Admission medications    Medication Sig Start Date End Date Taking? Authorizing Provider   buPROPion XL (WELLBUTRIN XL) 150 MG 24 hr tablet TAKE 1 TABLET BY MOUTH DAILY. 12/5/22   Raysa Yepez APRN   busPIRone (BUSPAR) 5 MG tablet TAKE 1 TABLET BY MOUTH 3 (THREE) TIMES A DAY. 12/5/22   Raysa Yepez APRN   ezetimibe (Zetia) 10 MG tablet Take 1 tablet by mouth Daily. 12/6/22   Raysa Yepez APRN   fluticasone (FLONASE) 50 MCG/ACT nasal spray USE 2 SPRAYS IN EACH NOSTRIL DAILY AS DIRECTED 10/21/21   Raysa Yepez  "CLOVIS   loratadine (CLARITIN) 10 MG tablet Take 1 tablet by mouth Daily. 11/28/22   Raysa Yepez APRN   meloxicam (MOBIC) 15 MG tablet TAKE 1 TABLET BY MOUTH DAILY. 12/5/22   Raysa Yepez APRN   omeprazole (priLOSEC) 40 MG capsule TAKE 1 CAPSULE BY MOUTH EVERY MORNING BEFORE BREAKFAST. 12/5/22   Raysa Yepez APRN   vitamin D (ERGOCALCIFEROL) 1.25 MG (19978 UT) capsule capsule TAKE 1 CAPSULE BY MOUTH 1 (ONE) TIME PER WEEK. 12/5/22   Raysa Yepez APRN       Allergies:  Lortab [hydrocodone-acetaminophen]    ROS:    Review of Systems   Constitutional: Negative.  Negative for fever and unexpected weight change.   HENT: Negative.  Negative for trouble swallowing.    Eyes: Negative.    Respiratory: Negative.  Negative for shortness of breath.    Cardiovascular: Negative.  Negative for chest pain.   Gastrointestinal: Positive for abdominal pain (episodic since childhood) and constipation (mild, chronic and unchanged). Negative for abdominal distention, anal bleeding, blood in stool, diarrhea, nausea, rectal pain and vomiting.   Endocrine: Negative.    Genitourinary: Negative.    Skin: Negative.    Neurological: Negative.    Hematological: Negative.    Psychiatric/Behavioral: Negative.      Objective     Blood pressure 130/82, pulse 81, height 185.4 cm (73\"), weight 126 kg (278 lb).    Physical Exam  Vitals and nursing note reviewed.   Constitutional:       Appearance: Normal appearance.   HENT:      Head: Normocephalic and atraumatic.      Mouth/Throat:      Mouth: Mucous membranes are moist.      Pharynx: Oropharynx is clear.   Eyes:      General: No scleral icterus.     Conjunctiva/sclera: Conjunctivae normal.   Cardiovascular:      Rate and Rhythm: Normal rate and regular rhythm.   Pulmonary:      Effort: Pulmonary effort is normal.      Breath sounds: Normal breath sounds.   Abdominal:      General: Bowel sounds are normal.      Palpations: Abdomen is soft.      Tenderness: There " is no abdominal tenderness. There is no guarding or rebound.   Skin:     General: Skin is warm.      Coloration: Skin is not jaundiced.   Neurological:      General: No focal deficit present.      Mental Status: He is alert.   Psychiatric:         Behavior: Behavior normal.          Assessment & Plan   Diagnoses and all orders for this visit:    1. Elevated liver enzymes (Primary)  -     US Liver; Future  -     Protime-INR  -     ANTOINETTE  -     Comprehensive Metabolic Panel  -     AFP Tumor Marker  -     Gamma GT  -     Alpha - 1 - Antitrypsin  -     Ceruloplasmin  -     GARCIA Fibrosure  -     Methylmalonic Acid, Serum  -     Iron Profile  -     Ferritin  -     Anti-Smooth Muscle Antibody Titer        * Surgery not found *     Diagnosis Plan   1. Elevated liver enzymes  US Liver    Protime-INR    ANTOINETTE    Comprehensive Metabolic Panel    AFP Tumor Marker    Gamma GT    Alpha - 1 - Antitrypsin    Ceruloplasmin    GARCIA Fibrosure    Methylmalonic Acid, Serum    Iron Profile    Ferritin    Anti-Smooth Muscle Antibody Titer          Anticipated Surgical Procedure:  Orders Placed This Encounter   Procedures   • US Liver     Standing Status:   Future     Standing Expiration Date:   12/9/2023     Order Specific Question:   Will this be performed with Elastography? (KEIRA INFANTE, and GERONIMO ONLY) IF YES, must order ADDITIONAL US ELASTOGRAPHY exam.     Answer:   No     Order Specific Question:   Reason for Exam:     Answer:   LFT ELEVATION, FATTY LIVER     Order Specific Question:   Release to patient     Answer:   Routine Release   • Protime-INR     Order Specific Question:   Release to patient     Answer:   Routine Release   • ANTOINETTE     Order Specific Question:   Release to patient     Answer:   Routine Release   • Comprehensive Metabolic Panel     Order Specific Question:   Release to patient     Answer:   Routine Release   • AFP Tumor Marker     Order Specific Question:   Release to patient     Answer:   Routine Release   •  Gamma GT     Order Specific Question:   Release to patient     Answer:   Routine Release   • Alpha - 1 - Antitrypsin     Order Specific Question:   Release to patient     Answer:   Routine Release   • Ceruloplasmin     Order Specific Question:   Release to patient     Answer:   Routine Release   • GARCIA Fibrosure     Order Specific Question:   Release to patient     Answer:   Routine Release   • Methylmalonic Acid, Serum     Order Specific Question:   Release to patient     Answer:   Routine Release   • Iron Profile   • Ferritin     Order Specific Question:   Release to patient     Answer:   Routine Release   • Anti-Smooth Muscle Antibody Titer     Order Specific Question:   Release to patient     Answer:   Routine Release       The risks, benefits, and alternatives of this procedure have been discussed with the patient or the responsible party- the patient understands and agrees to proceed.

## 2023-01-20 ENCOUNTER — LAB (OUTPATIENT)
Dept: LAB | Facility: OTHER | Age: 38
End: 2023-01-20
Payer: COMMERCIAL

## 2023-01-20 LAB
ALBUMIN SERPL-MCNC: 4.4 G/DL (ref 3.5–5)
ALBUMIN/GLOB SERPL: 1.6 G/DL (ref 1.1–1.8)
ALP SERPL-CCNC: 67 U/L (ref 38–126)
ALPHA-FETOPROTEIN: 2.34 NG/ML (ref 0–8.3)
ALPHA1 GLOB MFR UR ELPH: 137 MG/DL (ref 90–200)
ALT SERPL W P-5'-P-CCNC: 150 U/L
ANION GAP SERPL CALCULATED.3IONS-SCNC: 5 MMOL/L (ref 5–15)
AST SERPL-CCNC: 56 U/L (ref 17–59)
BILIRUB SERPL-MCNC: 0.7 MG/DL (ref 0.2–1.3)
BUN SERPL-MCNC: 15 MG/DL (ref 7–23)
BUN/CREAT SERPL: 13.3 (ref 7–25)
CALCIUM SPEC-SCNC: 8.9 MG/DL (ref 8.4–10.2)
CERULOPLASMIN SERPL-MCNC: 19 MG/DL (ref 16–31)
CHLORIDE SERPL-SCNC: 105 MMOL/L (ref 101–112)
CO2 SERPL-SCNC: 29 MMOL/L (ref 22–30)
CREAT SERPL-MCNC: 1.13 MG/DL (ref 0.7–1.3)
EGFRCR SERPLBLD CKD-EPI 2021: 84.8 ML/MIN/1.73
FERRITIN SERPL-MCNC: 383 NG/ML (ref 30–400)
GLOBULIN UR ELPH-MCNC: 2.8 GM/DL (ref 2.3–3.5)
GLUCOSE SERPL-MCNC: 94 MG/DL (ref 70–99)
INR PPP: 1 (ref 0.8–1.2)
IRON 24H UR-MRATE: 167 MCG/DL (ref 59–158)
IRON SATN MFR SERPL: 52 % (ref 20–50)
POTASSIUM SERPL-SCNC: 4.4 MMOL/L (ref 3.4–5)
PROT SERPL-MCNC: 7.2 G/DL (ref 6.3–8.6)
PROTHROMBIN TIME: 13 SECONDS (ref 11.1–15.3)
SODIUM SERPL-SCNC: 139 MMOL/L (ref 137–145)
TIBC SERPL-MCNC: 320 MCG/DL (ref 298–536)
TRANSFERRIN SERPL-MCNC: 215 MG/DL (ref 200–360)

## 2023-01-20 PROCEDURE — 86015 ACTIN ANTIBODY EACH: CPT | Performed by: PHYSICIAN ASSISTANT

## 2023-01-20 PROCEDURE — 82977 ASSAY OF GGT: CPT | Performed by: PHYSICIAN ASSISTANT

## 2023-01-20 PROCEDURE — 83540 ASSAY OF IRON: CPT | Performed by: PHYSICIAN ASSISTANT

## 2023-01-20 PROCEDURE — 86038 ANTINUCLEAR ANTIBODIES: CPT | Performed by: PHYSICIAN ASSISTANT

## 2023-01-20 PROCEDURE — 80053 COMPREHEN METABOLIC PANEL: CPT | Performed by: PHYSICIAN ASSISTANT

## 2023-01-20 PROCEDURE — 83921 ORGANIC ACID SINGLE QUANT: CPT | Performed by: PHYSICIAN ASSISTANT

## 2023-01-20 PROCEDURE — 82105 ALPHA-FETOPROTEIN SERUM: CPT | Performed by: PHYSICIAN ASSISTANT

## 2023-01-20 PROCEDURE — 84478 ASSAY OF TRIGLYCERIDES: CPT | Performed by: PHYSICIAN ASSISTANT

## 2023-01-20 PROCEDURE — 82172 ASSAY OF APOLIPOPROTEIN: CPT | Performed by: PHYSICIAN ASSISTANT

## 2023-01-20 PROCEDURE — 82103 ALPHA-1-ANTITRYPSIN TOTAL: CPT | Performed by: PHYSICIAN ASSISTANT

## 2023-01-20 PROCEDURE — 82728 ASSAY OF FERRITIN: CPT | Performed by: PHYSICIAN ASSISTANT

## 2023-01-20 PROCEDURE — 85610 PROTHROMBIN TIME: CPT | Performed by: PHYSICIAN ASSISTANT

## 2023-01-20 PROCEDURE — 82390 ASSAY OF CERULOPLASMIN: CPT | Performed by: PHYSICIAN ASSISTANT

## 2023-01-20 PROCEDURE — 83883 ASSAY NEPHELOMETRY NOT SPEC: CPT | Performed by: PHYSICIAN ASSISTANT

## 2023-01-20 PROCEDURE — 82465 ASSAY BLD/SERUM CHOLESTEROL: CPT | Performed by: PHYSICIAN ASSISTANT

## 2023-01-20 PROCEDURE — 84466 ASSAY OF TRANSFERRIN: CPT | Performed by: PHYSICIAN ASSISTANT

## 2023-01-20 PROCEDURE — 83010 ASSAY OF HAPTOGLOBIN QUANT: CPT | Performed by: PHYSICIAN ASSISTANT

## 2023-01-21 LAB — SMA IGG SER-ACNC: 9 UNITS (ref 0–19)

## 2023-01-23 LAB — ANA SER QL: NEGATIVE

## 2023-01-24 LAB
A2 MACROGLOB SERPL-MCNC: 115 MG/DL (ref 110–276)
ALT SERPL W P-5'-P-CCNC: 141 IU/L (ref 0–55)
APO A-I SERPL-MCNC: 99 MG/DL (ref 101–178)
AST SERPL W P-5'-P-CCNC: 48 IU/L (ref 0–40)
BILIRUB SERPL-MCNC: 0.4 MG/DL (ref 0–1.2)
CHOLEST SERPL-MCNC: 156 MG/DL (ref 100–199)
FIBROSIS SCORING:: ABNORMAL
FIBROSIS STAGE SERPL QL: ABNORMAL
GGT SERPL-CCNC: 42 IU/L (ref 0–65)
GLUCOSE SERPL-MCNC: 90 MG/DL (ref 70–99)
HAPTOGLOB SERPL-MCNC: 142 MG/DL (ref 17–317)
INTERPRETATIONS: (REFERENCE): ABNORMAL
LABORATORY COMMENT REPORT: ABNORMAL
LIVER FIBR SCORE SERPL CALC.FIBROSURE: 0.1 (ref 0–0.21)
METHYLMALONATE SERPL-SCNC: 122 NMOL/L (ref 0–378)
NASH SCORING (REFERENCE): ABNORMAL
NECROINFLAMMATORY ACT GRADE SERPL QL: ABNORMAL
NECROINFLAMMATORY ACT SCORE SERPL: 0.5
SERVICE CMNT-IMP: ABNORMAL
STEATOSIS GRADE (REFERENCE): ABNORMAL
STEATOSIS GRADING (REFERENCE): ABNORMAL
STEATOSIS SCORE (REFERENCE): 0.83 (ref 0–0.3)
TRIGL SERPL-MCNC: 170 MG/DL (ref 0–149)

## 2023-01-27 ENCOUNTER — OFFICE VISIT (OUTPATIENT)
Dept: GASTROENTEROLOGY | Facility: CLINIC | Age: 38
End: 2023-01-27
Payer: COMMERCIAL

## 2023-01-27 VITALS
SYSTOLIC BLOOD PRESSURE: 160 MMHG | HEART RATE: 87 BPM | DIASTOLIC BLOOD PRESSURE: 100 MMHG | WEIGHT: 277 LBS | BODY MASS INDEX: 36.71 KG/M2 | HEIGHT: 73 IN

## 2023-01-27 DIAGNOSIS — K76.0 NAFLD (NONALCOHOLIC FATTY LIVER DISEASE): ICD-10-CM

## 2023-01-27 DIAGNOSIS — R74.8 ELEVATED LIVER ENZYMES: Primary | ICD-10-CM

## 2023-01-27 PROCEDURE — 99213 OFFICE O/P EST LOW 20 MIN: CPT | Performed by: PHYSICIAN ASSISTANT

## 2023-01-27 NOTE — PROGRESS NOTES
[facility]      Chief Complaint:   Chief Complaint   Patient presents with   • Follow-up       Subjective     HPI:   Patient presents for follow-up after having labs and ultrasound completed due to elevated liver enzymes and history of fatty liver seen on ultrasound.  Patient and wife report today that he has made diet modifications and is starting to increase his physical activity daily.  Patient denies any problems today.    Ultrasound liver shows fatty infiltration of the liver.  Laboratory studies: , AST 56, alkaline phosphatase 67, total bilirubin 0.7, GGT 42, triglycerides 170, AFP 2.34, alpha-1 antitrypsin 137, ceruloplasmin 19, ANTOINETTE negative, AMA 9 (negative), INR 1.0, iron 167, iron saturation 52%, [place] FibroSure score = fibrosis score 0.1/F0, steatosis score 0.83/S3, [place] score 0.5/N1.    Assessment/plan:  1.  Elevated liver transaminase  2.  NAFLD  Discussed with patient and wife that losing weight, diet modifications, increasing physical activity and decreasing carbohydrates are very effective in the management of NAFLD.  Advised him to make manageable, sustainable changes that he can make as a lifestyle as opposed to a quick weight loss options that are not sustainable. His iron and iron saturation are mildly elevated but have been normal in the past. I will recheck this in 6 months, if it remains elevated I will check hemochromatosis mutation test. Otherwise, he will follow up with labs in 6 months.     Past Medical History:   Past Medical History:   Diagnosis Date   • Acute bronchitis    • Acute pharyngitis    • Backache    • Chronic low back pain    • Degenerative disc disease, lumbar    • Dysfunction of eustachian tube    • Exanthematous disorder    • Generalized abdominal pain    • GERD (gastroesophageal reflux disease)    • Headache    • Health examination of defined sub-group    • Heat exhaustion    • Influenza- like illness    • Intervertebral  disc prolapse    • Low back pain     with radiculopathy   • Malaise and fatigue    • Pain in elbow    • Pneumonia    • Sciatica    • Skin sensation disturbance        Past Surgical History:    Past Surgical History:   Procedure Laterality Date   • BACK SURGERY     • INJECTION OF MEDICATION      Depo Medrol (80mg)   • INJECTION OF MEDICATION      Dexamethasone (5mg)   • INJECTION OF MEDICATION      Kenalog (40mg)   • INJECTION OF MEDICATION      Rocephin (1 gm)   • INJECTION OF MEDICATION      Toradol (60mg)   • SHOULDER ARTHROSCOPY Right 8/10/2018    Procedure: SHOULDER ARTHROSCOPY, DANE procedure, and Subacromial decompression;  Surgeon: David Miller MD;  Location: [facility];  Service: Orthopedics       Family History:  History reviewed. No pertinent family history.    Social History:   reports that he has been smoking cigarettes. He has been smoking an average of 1 pack per day. He uses smokeless tobacco. He reports current alcohol use. He reports that he does not use drugs.    Medications:   Prior to Admission medications    Medication Sig Start Date End Date Taking? Authorizing Provider   buPROPion XL (WELLBUTRIN XL) 150 MG 24 hr tablet TAKE 1 TABLET BY MOUTH DAILY. 12/5/22   Raysa Yepez APRN   busPIRone (BUSPAR) 5 MG tablet TAKE 1 TABLET BY MOUTH 3 (THREE) TIMES A DAY. 12/5/22   Raysa Yepez APRN   ezetimibe (Zetia) 10 MG tablet Take 1 tablet by mouth Daily. 12/6/22   Raysa Yepez APRN   fluticasone (FLONASE) 50 MCG/ACT nasal spray USE 2 SPRAYS IN EACH NOSTRIL DAILY AS DIRECTED 10/21/21   Raysa Yepez APRN   loratadine (CLARITIN) 10 MG tablet Take 1 tablet by mouth Daily. 11/28/22   Raysa Yepez APRN   meloxicam (MOBIC) 15 MG tablet TAKE 1 TABLET BY MOUTH DAILY. 12/5/22   Raysa Yepez APRN   omeprazole (priLOSEC) 40 MG capsule TAKE 1 CAPSULE BY MOUTH EVERY MORNING BEFORE BREAKFAST. 12/5/22   Raysa Yepez APRN   vitamin D  "(ERGOCALCIFEROL) 1.25 MG (11405 UT) capsule capsule TAKE 1 CAPSULE BY MOUTH 1 (ONE) TIME PER WEEK. 12/5/22   Raysa Yepez APRN       Allergies:  Lortab [hydrocodone-acetaminophen]    ROS:    Review of Systems   Constitutional: Negative.  Negative for fever.   HENT: Negative.    Eyes: Negative.    Respiratory: Negative.  Negative for shortness of breath.    Cardiovascular: Negative.  Negative for chest pain.   Gastrointestinal: Negative.    Endocrine: Negative.    Genitourinary: Negative.    Skin: Negative.    Neurological: Negative.    Hematological: Negative.    Psychiatric/Behavioral: Negative.      Objective     Blood pressure 160/100, pulse 87, height 185.4 cm (73\"), weight 126 kg (277 lb).    Physical Exam  Vitals and nursing note reviewed. Exam conducted with a chaperone present.   Constitutional:       Appearance: Normal appearance. He is obese.   Eyes:      General: No scleral icterus.  Pulmonary:      Effort: Pulmonary effort is normal.   Skin:     Coloration: Skin is not jaundiced.   Neurological:      General: No focal deficit present.      Mental Status: He is alert.   Psychiatric:         Behavior: Behavior normal.          Assessment & Plan   Diagnoses and all orders for this visit:    1. Elevated liver enzymes (Primary)  -     Protime-INR; Future  -     Iron Profile; Future  -     Comprehensive Metabolic Panel; Future  -     CBC (No Diff); Future    2. NAFLD (nonalcoholic fatty liver disease)  -     Protime-INR; Future  -     Iron Profile; Future  -     Comprehensive Metabolic Panel; Future  -     CBC (No Diff); Future        * Surgery not found *     Diagnosis Plan   1. Elevated liver enzymes  Protime-INR    Iron Profile    Comprehensive Metabolic Panel    CBC (No Diff)      2. NAFLD (nonalcoholic fatty liver disease)  Protime-INR    Iron Profile    Comprehensive Metabolic Panel    CBC (No Diff)          Anticipated Surgical Procedure:  Orders Placed This Encounter   Procedures   • " Protime-INR     Standing Status:   Future     Standing Expiration Date:   1/27/2024     Order Specific Question:   Release to patient     Answer:   Routine Release   • Iron Profile     Standing Status:   Future     Standing Expiration Date:   1/27/2024   • Comprehensive Metabolic Panel     Standing Status:   Future     Standing Expiration Date:   1/27/2024     Order Specific Question:   Release to patient     Answer:   Routine Release   • CBC (No Diff)     Standing Status:   Future     Standing Expiration Date:   1/27/2024     Order Specific Question:   Release to patient     Answer:   Routine Release       The risks, benefits, and alternatives of this procedure have been discussed with the patient or the responsible party- the patient understands and agrees to proceed.

## 2023-04-19 ENCOUNTER — OFFICE VISIT (OUTPATIENT)
Dept: FAMILY MEDICINE CLINIC | Facility: CLINIC | Age: 38
End: 2023-04-19
Payer: COMMERCIAL

## 2023-04-19 VITALS
WEIGHT: 269.8 LBS | SYSTOLIC BLOOD PRESSURE: 132 MMHG | HEART RATE: 89 BPM | OXYGEN SATURATION: 98 % | DIASTOLIC BLOOD PRESSURE: 84 MMHG | BODY MASS INDEX: 35.76 KG/M2 | HEIGHT: 73 IN

## 2023-04-19 DIAGNOSIS — M25.571 ACUTE RIGHT ANKLE PAIN: Primary | ICD-10-CM

## 2023-04-19 PROCEDURE — 99213 OFFICE O/P EST LOW 20 MIN: CPT | Performed by: NURSE PRACTITIONER

## 2023-04-19 PROCEDURE — 1159F MED LIST DOCD IN RCRD: CPT | Performed by: NURSE PRACTITIONER

## 2023-04-19 PROCEDURE — 1160F RVW MEDS BY RX/DR IN RCRD: CPT | Performed by: NURSE PRACTITIONER

## 2023-04-19 NOTE — PROGRESS NOTES
CC: Ankle Pain (Right ankle, just woke up, x1 month )      Subjective:  Marko Jaquez is a 37 y.o. male who presents for         Patient presents to office with complaints of right ankle pain.  This is been ongoing x1 month now.  Denies known injury.  He states he just woke up 1 morning and had pain.  The pain is described as sharp. It is intermittent and worsening. Rates pain at 5 currently on pain scale. States gets as bad as 9 at night. Ambulating and movement aggravates the pain. Rest helps the pain. Has associated swelling and bruising. Denies associated erythema. No treatment prior to arrival.       The following portions of the patient's history were reviewed and updated as appropriate: allergies, current medications, past family history, past medical history, past social history, past surgical history and problem list.    Past Medical History:   Diagnosis Date   • Acute bronchitis    • Acute pharyngitis    • Backache    • Chronic low back pain    • Degenerative disc disease, lumbar    • Dysfunction of eustachian tube    • Exanthematous disorder    • Generalized abdominal pain    • GERD (gastroesophageal reflux disease)    • Headache    • Health examination of defined sub-group    • Heat exhaustion    • Influenza- like illness    • Intervertebral disc prolapse    • Low back pain     with radiculopathy   • Malaise and fatigue    • Pain in elbow    • Pneumonia    • Sciatica    • Skin sensation disturbance          Current Outpatient Medications:   •  buPROPion XL (WELLBUTRIN XL) 150 MG 24 hr tablet, TAKE 1 TABLET BY MOUTH DAILY., Disp: 30 tablet, Rfl: 5  •  busPIRone (BUSPAR) 5 MG tablet, TAKE 1 TABLET BY MOUTH 3 (THREE) TIMES A DAY., Disp: 90 tablet, Rfl: 5  •  ezetimibe (Zetia) 10 MG tablet, Take 1 tablet by mouth Daily., Disp: 30 tablet, Rfl: 5  •  fluticasone (FLONASE) 50 MCG/ACT nasal spray, USE 2 SPRAYS IN EACH NOSTRIL DAILY AS DIRECTED, Disp: 16 g, Rfl: 0  •  loratadine (CLARITIN) 10 MG tablet, Take  "1 tablet by mouth Daily., Disp: 30 tablet, Rfl: 5  •  meloxicam (MOBIC) 15 MG tablet, TAKE 1 TABLET BY MOUTH DAILY., Disp: 30 tablet, Rfl: 5  •  omeprazole (priLOSEC) 40 MG capsule, TAKE 1 CAPSULE BY MOUTH EVERY MORNING BEFORE BREAKFAST., Disp: 30 capsule, Rfl: 5  •  vitamin D (ERGOCALCIFEROL) 1.25 MG (72638 UT) capsule capsule, TAKE 1 CAPSULE BY MOUTH 1 (ONE) TIME PER WEEK., Disp: 4 capsule, Rfl: 5    Review of Systems    Review of Systems   Constitutional: Negative.    HENT: Negative.    Eyes: Negative.    Respiratory: Negative.    Cardiovascular: Negative.    Gastrointestinal: Negative.    Endocrine: Negative.    Genitourinary: Negative.    Musculoskeletal: Positive for arthralgias.   Skin: Negative.    Allergic/Immunologic: Negative.    Neurological: Negative.    Hematological: Negative.    Psychiatric/Behavioral: Negative.    All other systems reviewed and are negative.      Objective  Vitals:    04/19/23 1005   BP: 132/84   Pulse: 89   SpO2: 98%   Weight: 122 kg (269 lb 12.8 oz)   Height: 185.4 cm (73\")     Body mass index is 35.6 kg/m².    Physical Exam    Physical Exam  Vitals and nursing note reviewed.   Constitutional:       General: He is not in acute distress.     Appearance: Normal appearance. He is not ill-appearing, toxic-appearing or diaphoretic.   HENT:      Head: Normocephalic and atraumatic.   Cardiovascular:      Rate and Rhythm: Normal rate and regular rhythm.      Pulses: Normal pulses.      Heart sounds: Normal heart sounds. No murmur heard.    No friction rub. No gallop.   Pulmonary:      Effort: Pulmonary effort is normal. No respiratory distress.      Breath sounds: Normal breath sounds. No stridor. No wheezing, rhonchi or rales.   Chest:      Chest wall: No tenderness.   Musculoskeletal:      Cervical back: Normal range of motion and neck supple.      Right lower leg: Edema present.      Comments: Rt ankle with swelling noted to the lateral aspect. There is pain on palpation of the lateral " malleolus. No bruising noted. Has full ROM but with pain noted.   Skin:     General: Skin is warm and dry.      Findings: No rash.   Neurological:      Mental Status: He is alert.             Diagnoses and all orders for this visit:    1. Acute right ankle pain (Primary)  -     XR Ankle 3+ View Right; Future    Because of patient's persistent pain in the right ankle, will obtain an x-ray of the right ankle on patient's way out today.  We will notify him of these results once they are available.  He is advised to take Tylenol as needed.  He is also advised RICE to help with the pain.  He is to follow-up if no improvement in symptoms.  Answered all questions.  Patient verbalized understanding of plan of care.        This document has been electronically signed by CLOVIS Mckeon on April 19, 2023 10:34 CDT

## 2023-05-08 DIAGNOSIS — F32.A DEPRESSION, UNSPECIFIED DEPRESSION TYPE: Chronic | ICD-10-CM

## 2023-05-08 DIAGNOSIS — F41.9 ANXIETY: Chronic | ICD-10-CM

## 2023-05-08 DIAGNOSIS — G89.29 CHRONIC RIGHT SHOULDER PAIN: Chronic | ICD-10-CM

## 2023-05-08 DIAGNOSIS — E55.9 VITAMIN D DEFICIENCY: Chronic | ICD-10-CM

## 2023-05-08 DIAGNOSIS — R09.81 NASAL CONGESTION: ICD-10-CM

## 2023-05-08 DIAGNOSIS — K21.9 GASTROESOPHAGEAL REFLUX DISEASE WITHOUT ESOPHAGITIS: Chronic | ICD-10-CM

## 2023-05-08 DIAGNOSIS — M25.511 CHRONIC RIGHT SHOULDER PAIN: Chronic | ICD-10-CM

## 2023-05-08 RX ORDER — LORATADINE 10 MG/1
10 TABLET ORAL DAILY
Qty: 30 TABLET | Refills: 0 | Status: SHIPPED | OUTPATIENT
Start: 2023-05-08

## 2023-05-08 RX ORDER — MELOXICAM 15 MG/1
15 TABLET ORAL DAILY
Qty: 30 TABLET | Refills: 0 | Status: SHIPPED | OUTPATIENT
Start: 2023-05-08

## 2023-05-08 RX ORDER — OMEPRAZOLE 40 MG/1
40 CAPSULE, DELAYED RELEASE ORAL
Qty: 30 CAPSULE | Refills: 0 | Status: SHIPPED | OUTPATIENT
Start: 2023-05-08

## 2023-05-08 RX ORDER — EZETIMIBE 10 MG/1
10 TABLET ORAL DAILY
Qty: 30 TABLET | Refills: 0 | Status: SHIPPED | OUTPATIENT
Start: 2023-05-08

## 2023-05-08 RX ORDER — ERGOCALCIFEROL 1.25 MG/1
50000 CAPSULE ORAL WEEKLY
Qty: 4 CAPSULE | Refills: 0 | Status: SHIPPED | OUTPATIENT
Start: 2023-05-08

## 2023-05-08 RX ORDER — BUPROPION HYDROCHLORIDE 150 MG/1
150 TABLET ORAL DAILY
Qty: 30 TABLET | Refills: 0 | Status: SHIPPED | OUTPATIENT
Start: 2023-05-08

## 2023-05-08 RX ORDER — BUSPIRONE HYDROCHLORIDE 5 MG/1
5 TABLET ORAL 3 TIMES DAILY
Qty: 90 TABLET | Refills: 0 | Status: SHIPPED | OUTPATIENT
Start: 2023-05-08

## 2023-07-28 ENCOUNTER — OFFICE VISIT (OUTPATIENT)
Dept: GASTROENTEROLOGY | Facility: CLINIC | Age: 38
End: 2023-07-28
Payer: COMMERCIAL

## 2023-07-28 ENCOUNTER — LAB (OUTPATIENT)
Dept: LAB | Facility: OTHER | Age: 38
End: 2023-07-28
Payer: COMMERCIAL

## 2023-07-28 VITALS
SYSTOLIC BLOOD PRESSURE: 138 MMHG | DIASTOLIC BLOOD PRESSURE: 88 MMHG | HEIGHT: 72 IN | WEIGHT: 265.8 LBS | BODY MASS INDEX: 36 KG/M2 | HEART RATE: 80 BPM

## 2023-07-28 DIAGNOSIS — E78.5 HYPERLIPIDEMIA, UNSPECIFIED HYPERLIPIDEMIA TYPE: Chronic | ICD-10-CM

## 2023-07-28 DIAGNOSIS — R74.8 ELEVATED LIVER ENZYMES: Primary | ICD-10-CM

## 2023-07-28 DIAGNOSIS — E34.9 TESTOSTERONE DEFICIENCY: Chronic | ICD-10-CM

## 2023-07-28 DIAGNOSIS — K76.0 NAFLD (NONALCOHOLIC FATTY LIVER DISEASE): ICD-10-CM

## 2023-07-28 LAB
ARTICHOKE IGE QN: 98 MG/DL (ref 0–100)
TRIGL SERPL-MCNC: 146 MG/DL

## 2023-07-28 PROCEDURE — 84402 ASSAY OF FREE TESTOSTERONE: CPT | Performed by: NURSE PRACTITIONER

## 2023-07-28 PROCEDURE — 83721 ASSAY OF BLOOD LIPOPROTEIN: CPT | Performed by: NURSE PRACTITIONER

## 2023-07-28 PROCEDURE — 84403 ASSAY OF TOTAL TESTOSTERONE: CPT | Performed by: NURSE PRACTITIONER

## 2023-07-28 PROCEDURE — 84478 ASSAY OF TRIGLYCERIDES: CPT | Performed by: NURSE PRACTITIONER

## 2023-07-28 NOTE — PROGRESS NOTES
[facility]      Chief Complaint:   Chief Complaint   Patient presents with   • Follow-up       Subjective     HPI:   Follow-up related to NAFLD and elevated liver enzymes.  Patient had fatty liver seen on previous ultrasound.  At his last visit, he and his wife had made significant dietary modifications and he was starting to increase his physical activity.    Today, patient's weight is down 12 pounds.  Patient has removed many processed carbohydrates from his diet has discontinued soda and making healthier dietary choices overall.  Patient states he feels much better after making his dietary changes.    Assessment/plan:  1.  NAFLD  2.  Elevated LFTs    Patient had labs drawn just prior to visit, will contact with results.  He will continue dietary modifications and increasing physical activity to continue his weight loss.  Follow-up will be planned in 6 months with labs drawn a week prior.  Patient advised to contact the office for any problems prior to scheduled follow-up.    Past Medical History:   Past Medical History:   Diagnosis Date   • Acute bronchitis    • Acute pharyngitis    • Backache    • Chronic low back pain    • Degenerative disc disease, lumbar    • Dysfunction of eustachian tube    • Exanthematous disorder    • Generalized abdominal pain    • GERD (gastroesophageal reflux disease)    • Headache    • Health examination of defined sub-group    • Heat exhaustion    • Influenza- like illness    • Intervertebral disc prolapse    • Low back pain     with radiculopathy   • Malaise and fatigue    • Pain in elbow    • Pneumonia    • Sciatica    • Skin sensation disturbance        Past Surgical History:    Past Surgical History:   Procedure Laterality Date   • BACK SURGERY     • INJECTION OF MEDICATION      Depo Medrol (80mg)   • INJECTION OF MEDICATION      Dexamethasone (5mg)   • INJECTION OF MEDICATION      Kenalog (40mg)   • INJECTION OF MEDICATION      Rocephin  (1 gm)   • INJECTION OF MEDICATION      Toradol (60mg)   • SHOULDER ARTHROSCOPY Right 8/10/2018    Procedure: SHOULDER ARTHROSCOPY, DANE procedure, and Subacromial decompression;  Surgeon: David Miller MD;  Location: [facility];  Service: Orthopedics       Family History:  History reviewed. No pertinent family history.    Social History:   reports that he has been smoking cigarettes. He has been smoking an average of 1 pack per day. He uses smokeless tobacco. He reports current alcohol use. He reports that he does not use drugs.    Medications:   Prior to Admission medications    Medication Sig Start Date End Date Taking? Authorizing Provider   buPROPion XL (Wellbutrin XL) 300 MG 24 hr tablet Take 1 tablet by mouth Daily. 7/20/23  Yes Raysa Yepez APRN   busPIRone (BUSPAR) 5 MG tablet Take 1 tablet by mouth 3 (Three) Times a Day. 7/20/23  Yes Raysa Yepez APRN   ezetimibe (ZETIA) 10 MG tablet Take 1 tablet by mouth Daily. 7/20/23  Yes Raysa Yepez APRN   fluticasone (FLONASE) 50 MCG/ACT nasal spray USE 2 SPRAYS IN EACH NOSTRIL DAILY AS DIRECTED 10/21/21  Yes Raysa Yepez APRN   loratadine (CLARITIN) 10 MG tablet Take 1 tablet by mouth Daily. 7/20/23  Yes Raysa Yepez APRN   meloxicam (MOBIC) 15 MG tablet Take 1 tablet by mouth Daily. 7/20/23  Yes Raysa Yepez APRN   omeprazole (priLOSEC) 40 MG capsule Take 1 capsule by mouth Every Morning Before Breakfast. 7/20/23  Yes Raysa Yepez APRN   vitamin D (ERGOCALCIFEROL) 1.25 MG (26997 UT) capsule capsule Take 1 capsule by mouth 1 (One) Time Per Week. 7/20/23  Yes Raysa Yepez APRN       Allergies:  Lortab [hydrocodone-acetaminophen]    ROS:    Review of Systems   Constitutional: Negative.  Negative for fever.   HENT: Negative.     Eyes: Negative.    Respiratory: Negative.  Negative for shortness of breath.    Cardiovascular: Negative.  Negative for chest pain.   Gastrointestinal:  "Negative.    Endocrine: Negative.    Genitourinary: Negative.    Skin: Negative.    Neurological: Negative.    Hematological: Negative.    Psychiatric/Behavioral: Negative.     Objective     Blood pressure 138/88, pulse 80, height 182.9 cm (72\"), weight 121 kg (265 lb 12.8 oz).    Physical Exam  Vitals and nursing note reviewed.   Constitutional:       Appearance: Normal appearance.   HENT:      Head: Normocephalic and atraumatic.   Cardiovascular:      Rate and Rhythm: Normal rate and regular rhythm.   Pulmonary:      Effort: Pulmonary effort is normal.      Breath sounds: Normal breath sounds.   Abdominal:      General: Bowel sounds are normal.      Palpations: Abdomen is soft.      Tenderness: There is no abdominal tenderness.   Skin:     Coloration: Skin is not jaundiced.   Neurological:      General: No focal deficit present.      Mental Status: He is alert.   Psychiatric:         Behavior: Behavior normal.        Assessment & Plan   Diagnoses and all orders for this visit:    1. Elevated liver enzymes (Primary)  -     Comprehensive Metabolic Panel; Future  -     Protime-INR; Future  -     CBC (No Diff); Future  -     GARCIA Fibrosure; Future    2. NAFLD (nonalcoholic fatty liver disease)  -     Comprehensive Metabolic Panel; Future  -     Protime-INR; Future  -     CBC (No Diff); Future  -     GARCIA Fibrosure; Future        * Surgery not found *     Diagnosis Plan   1. Elevated liver enzymes  Comprehensive Metabolic Panel    Protime-INR    CBC (No Diff)    GARCIA Fibrosure      2. NAFLD (nonalcoholic fatty liver disease)  Comprehensive Metabolic Panel    Protime-INR    CBC (No Diff)    GARCIA Fibrosure          Anticipated Surgical Procedure:  Orders Placed This Encounter   Procedures   • Comprehensive Metabolic Panel     Standing Status:   Future     Standing Expiration Date:   7/28/2024   • Protime-INR     Standing Status:   Future     Standing Expiration Date:   7/28/2024   • CBC (No Diff)     Standing Status:   " Future     Standing Expiration Date:   7/28/2024   • GARCIA Fibrosure     Standing Status:   Future     Standing Expiration Date:   7/28/2024     Order Specific Question:   Release to patient     Answer:   Routine Release       The risks, benefits, and alternatives of this procedure have been discussed with the patient or the responsible party- the patient understands and agrees to proceed.

## 2023-08-04 LAB
TESTOST FREE SERPL-MCNC: 7 PG/ML (ref 8.7–25.1)
TESTOST SERPL-MCNC: 333.9 NG/DL (ref 264–916)

## 2023-09-08 NOTE — PROGRESS NOTES
Daily Treatment Note    Time In: 13:20      Time Out: 14:10    ICD-10-CM ICD-9-CM   1. Acute shoulder pain due to trauma, right M25.511 719.41    G89.11 338.11       Subjective   Pt late for today's treatment. Pt reports pain is still in shld but he can move it better.   Patient reports to therapy 3/10 pain, and  some improvement.  Attendance  8/10 visits. Recert 2/27. MD f/u 2/13.      Objective     V cues necessary for correct TE performance. Mild pain increase post TE.     AROM:    Shld flex 147°, abd 158°.       PROCEDURES AND MODALITIES:     Ice  Ice Applied: Yes  Location: R shoulder  Rx Minutes: 15 mins  Ice S/P Rx: Yes    Electrical Stimulation  Stimulation Type: IFC  Location/Electrode Placement/Other: R shoulder  Rx Minutes: 15 mins       EXERCISE  Exercise 1  Exercise Name 1: PRO2  UE's  Equipment/Resistance 1: 4.0  Time: 5'  Exercise 2  Exercise Name 2: Supine posterior capsule stretch  Sets/Reps 2: 1/20 Exercise 3  Exercise Name 3: NO$  Equipment/Resistance 3: red tb  Sets/Reps 3: 2/20x Exercise 4  Exercise Name 4: Horz abd  Equipment/Resistance 4: yellow tb  Sets/Reps 4: 2/10x Exercise 5  Exercise Name 5: Window push ups  Sets/Reps 5: 20x Exercise 6  Exercise Name 6: B shld ext  Equipment/Resistance 6: red tb  Sets/Reps 6: 20x   Exercise 7  Exercise Name 7: B shld abd  Equipment/Resistance 7: yellow tb  Sets/Reps 7: 2/10x                                         Therapy Exercise 13744 35 minutes and Other Procedure CPT 15 minutes Electrical Stimulation    Total Treatment Time: 50 Minutes    Assessment/Plan   ROM improved from last taken measurements. Pt to have ortho consult tomorrow. Good tolerance of today's treatment.   Progress per Plan of Care             Eliazar Mann, PTA  Physical Therapist     Assistance with ambulation/Assistance OOB with selected safe patient handling equipment/Communicate Risk of Fall with Harm to all staff/Discuss with provider need for PT consult/Monitor for mental status changes/Monitor gait and stability/Move patient closer to nurses' station/Provide patient with walking aids - walker, cane, crutches/Reinforce activity limits and safety measures with patient and family/Reorient to person, place and time as needed/Tailored Fall Risk Interventions/Toileting schedule using arm’s reach rule for commode and bathroom/Use of alarms - bed, chair and/or voice tab/Visual Cue: Yellow wristband and red socks/Bed in lowest position, wheels locked, appropriate side rails in place/Call bell, personal items and telephone in reach/Instruct patient to call for assistance before getting out of bed or chair/Non-slip footwear when patient is out of bed/Acra to call system/Physically safe environment - no spills, clutter or unnecessary equipment/Purposeful Proactive Rounding/Room/bathroom lighting operational, light cord in reach
